# Patient Record
Sex: FEMALE | Race: WHITE | NOT HISPANIC OR LATINO | Employment: FULL TIME | URBAN - METROPOLITAN AREA
[De-identification: names, ages, dates, MRNs, and addresses within clinical notes are randomized per-mention and may not be internally consistent; named-entity substitution may affect disease eponyms.]

---

## 2018-01-05 ENCOUNTER — TRANSFERRED RECORDS (OUTPATIENT)
Dept: HEALTH INFORMATION MANAGEMENT | Facility: CLINIC | Age: 28
End: 2018-01-05

## 2018-01-05 ENCOUNTER — MEDICAL CORRESPONDENCE (OUTPATIENT)
Dept: HEALTH INFORMATION MANAGEMENT | Facility: CLINIC | Age: 28
End: 2018-01-05

## 2018-01-05 LAB — PAP SMEAR - HIM PATIENT REPORTED: NEGATIVE

## 2018-11-05 ENCOUNTER — TRANSFERRED RECORDS (OUTPATIENT)
Dept: HEALTH INFORMATION MANAGEMENT | Facility: CLINIC | Age: 28
End: 2018-11-05

## 2018-11-15 ENCOUNTER — OFFICE VISIT (OUTPATIENT)
Dept: FAMILY MEDICINE | Facility: CLINIC | Age: 28
End: 2018-11-15
Payer: COMMERCIAL

## 2018-11-15 VITALS
TEMPERATURE: 98.2 F | OXYGEN SATURATION: 97 % | BODY MASS INDEX: 21.47 KG/M2 | HEART RATE: 77 BPM | DIASTOLIC BLOOD PRESSURE: 72 MMHG | HEIGHT: 67 IN | WEIGHT: 136.8 LBS | SYSTOLIC BLOOD PRESSURE: 108 MMHG

## 2018-11-15 DIAGNOSIS — Z11.4 SCREENING FOR HIV (HUMAN IMMUNODEFICIENCY VIRUS): ICD-10-CM

## 2018-11-15 DIAGNOSIS — G43.109 MIGRAINE WITH AURA AND WITHOUT STATUS MIGRAINOSUS, NOT INTRACTABLE: ICD-10-CM

## 2018-11-15 DIAGNOSIS — Z29.89 ALTITUDE SICKNESS PREVENTATIVE MEASURES: ICD-10-CM

## 2018-11-15 DIAGNOSIS — Z00.01 ENCOUNTER FOR ROUTINE ADULT MEDICAL EXAM WITH ABNORMAL FINDINGS: Primary | ICD-10-CM

## 2018-11-15 DIAGNOSIS — F41.9 ANXIETY: ICD-10-CM

## 2018-11-15 DIAGNOSIS — Z23 NEED FOR PROPHYLACTIC VACCINATION AND INOCULATION AGAINST INFLUENZA: ICD-10-CM

## 2018-11-15 DIAGNOSIS — Z12.4 SCREENING FOR MALIGNANT NEOPLASM OF CERVIX: ICD-10-CM

## 2018-11-15 PROBLEM — R14.0 ABDOMINAL BLOATING: Status: ACTIVE | Noted: 2018-11-15

## 2018-11-15 PROBLEM — Z97.5 IUD (INTRAUTERINE DEVICE) IN PLACE: Status: ACTIVE | Noted: 2018-11-15

## 2018-11-15 PROCEDURE — 99214 OFFICE O/P EST MOD 30 MIN: CPT | Mod: 25 | Performed by: FAMILY MEDICINE

## 2018-11-15 PROCEDURE — G0145 SCR C/V CYTO,THINLAYER,RESCR: HCPCS | Performed by: FAMILY MEDICINE

## 2018-11-15 PROCEDURE — 99385 PREV VISIT NEW AGE 18-39: CPT | Performed by: FAMILY MEDICINE

## 2018-11-15 RX ORDER — SUMATRIPTAN 50 MG/1
TABLET, FILM COATED ORAL
Qty: 10 TABLET | Refills: 3 | Status: SHIPPED | OUTPATIENT
Start: 2018-11-15 | End: 2019-10-22

## 2018-11-15 RX ORDER — LORAZEPAM 0.5 MG/1
0.5 TABLET ORAL PRN
COMMUNITY
End: 2018-11-15 | Stop reason: ALTCHOICE

## 2018-11-15 RX ORDER — SUMATRIPTAN 50 MG/1
50 TABLET, FILM COATED ORAL
COMMUNITY
End: 2018-11-15

## 2018-11-15 RX ORDER — SUMATRIPTAN 50 MG/1
50 TABLET, FILM COATED ORAL
Qty: 10 TABLET | Refills: 3 | Status: SHIPPED | OUTPATIENT
Start: 2018-11-15 | End: 2018-11-15

## 2018-11-15 RX ORDER — ACETAZOLAMIDE 125 MG/1
125 TABLET ORAL 2 TIMES DAILY
Qty: 12 TABLET | Refills: 0 | Status: SHIPPED | OUTPATIENT
Start: 2018-11-15 | End: 2019-01-31

## 2018-11-15 RX ORDER — PROPRANOLOL HYDROCHLORIDE 10 MG/1
TABLET ORAL
Qty: 10 TABLET | Refills: 1 | Status: SHIPPED | OUTPATIENT
Start: 2018-11-15 | End: 2018-11-19

## 2018-11-15 ASSESSMENT — ANXIETY QUESTIONNAIRES
IF YOU CHECKED OFF ANY PROBLEMS ON THIS QUESTIONNAIRE, HOW DIFFICULT HAVE THESE PROBLEMS MADE IT FOR YOU TO DO YOUR WORK, TAKE CARE OF THINGS AT HOME, OR GET ALONG WITH OTHER PEOPLE: NOT DIFFICULT AT ALL
GAD7 TOTAL SCORE: 3
1. FEELING NERVOUS, ANXIOUS, OR ON EDGE: SEVERAL DAYS
2. NOT BEING ABLE TO STOP OR CONTROL WORRYING: NOT AT ALL
7. FEELING AFRAID AS IF SOMETHING AWFUL MIGHT HAPPEN: NOT AT ALL
3. WORRYING TOO MUCH ABOUT DIFFERENT THINGS: NOT AT ALL
6. BECOMING EASILY ANNOYED OR IRRITABLE: SEVERAL DAYS
5. BEING SO RESTLESS THAT IT IS HARD TO SIT STILL: NOT AT ALL

## 2018-11-15 ASSESSMENT — PATIENT HEALTH QUESTIONNAIRE - PHQ9
SUM OF ALL RESPONSES TO PHQ QUESTIONS 1-9: 0
5. POOR APPETITE OR OVEREATING: SEVERAL DAYS

## 2018-11-15 NOTE — PROGRESS NOTES
SUBJECTIVE:   CC: Allison J Bazinet is an 27 year old woman who presents for preventive health visit.     Patient transferring care to us at [place] from [facility] [place], [place].    She has an OB/GYN appointment with [facility] on 01/29/2019.      Healthy Habits:    Do you get at least three servings of calcium containing foods daily (dairy, green leafy vegetables, etc.)? yes    Amount of exercise or daily activities, outside of work: 3-4 days per week    Problems taking medications regularly No    Medication side effects: No    Have you had an eye exam in the past two years? yes    Do you see a dentist twice per year? yes    Do you have sleep apnea, excessive snoring or daytime drowsiness?no    Pt going to altitude to ? [place] for several days upcoming. Discussed in detail and pt desires diamox .       Anxiety Follow-Up:     Status since last visit: feels like she has a baseline level that she is used to, doesn't take the ativan but has it on hand. The last time she took it was in September 2017.    Other associated symptoms:None    Complicating factors:   Significant life event: Yes-  Just bought a house and holidays  Current substance abuse: None  Depression symptoms: No  Was on lorazepam prn - took it rarely several times a year.   GOOD-7 SCORE 11/15/2018   Total Score 3     GOOD-7.       Migraine Follow-Up    Headaches symptoms:  Last one was 2 months ago    Frequency: 0-1 per year     Duration of headaches: 1/4 to 1/2 day    Able to do normal daily activities/work with migraines: No - needs to sleep    Rescue/Relief medication:sumatriptan (Imitrex)              Effectiveness: total relief    Preventative medication: None    Neurologic complications: loss of speech    In the past 4 weeks, how often have you gone to Urgent Care or the emergency room because of your headaches?  0      Today's PHQ-2 Score:   PHQ-2 ( 1999 Pfizer) 11/15/2018   Q1: Little interest or pleasure in doing  things 0   Q2: Feeling down, depressed or hopeless 0   PHQ-2 Score 0       Abuse: Current or Past(Physical, Sexual or Emotional)- No  Do you feel safe in your environment - Yes      Social History   Substance Use Topics     Smoking status: Never Smoker     Smokeless tobacco: Never Used     Alcohol use Yes      Comment: 3-5 standard drinks or equivalent per week  -1.8-3.0 oz per week     If you drink alcohol do you typically have >3 drinks per day or >7 drinks per week? No                     Reviewed orders with patient.  Reviewed health maintenance and updated orders accordingly - Yes  BP Readings from Last 3 Encounters:   11/15/18 108/72    Wt Readings from Last 3 Encounters:   11/15/18 136 lb 12.8 oz (62.1 kg)                  Patient Active Problem List   Diagnosis     Anxiety - more social anxiety      Abdominal bloating     IUD (intrauterine device) in place     Migraine with aura and without status migrainosus, not intractable- can't do estrogen/combination ocp's      Altitude sickness preventative measures     Past Surgical History:   Procedure Laterality Date     LUMPECTOMY BREAST Right 2009    Benign, done in [place]     wisdom teeth extraction - all 4          Social History   Substance Use Topics     Smoking status: Never Smoker     Smokeless tobacco: Never Used     Alcohol use Yes      Comment: 3-5 standard drinks or equivalent per week  -1.8-3.0 oz per week     Family History   Problem Relation Age of Onset     Thyroid Disease Mother      partial thyroidectomy     Osteopenia Mother      Eye Disorder Maternal Grandmother      macular degeneration     HEART DISEASE Maternal Grandmother      Cerebrovascular Disease Maternal Grandfather      stroke and heart issues      Cardiac Sudden Death Maternal Grandfather 45     Cancer Maternal Uncle      CLL      Hypertension Father          Current Outpatient Prescriptions   Medication Sig Dispense Refill     acetaZOLAMIDE (DIAMOX) 125 MG tablet Take 1 tablet (125 mg)  by mouth 2 times daily for 6 days start day before ascent and continue 2-3 days at maximum altitude; may use once at night thereafter to improve sleep 12 tablet 0     levonorgestrel (MIRENA) 20 MCG/24HR IUD 1 each by Intrauterine route once Insert into the uterus, placed at [facility] [place] she thinks       propranolol (INDERAL) 10 MG tablet Take 1-2 tablets as needed and allow 30-60 minutes to take effect for anxiety 10 tablet 1     SUMAtriptan (IMITREX) 50 MG tablet Take 1 tab @ onset of migraine, may repeat x 1-2 tabs in 2 hours 10 tablet 3     No Known Allergies  No lab results found.     Mammogram not appropriate for this patient based on age.    No results found.      History of abnormal Pap smear: YES - updated in Problem List and Health Maintenance accordingly       Reviewed and updated as needed this visit by clinical staff  Tobacco  Allergies  Meds  Problems  Med Hx  Surg Hx  Fam Hx  Soc Hx          Reviewed and updated as needed this visit by Provider  Tobacco  Problems  Med Hx  Fam Hx  Soc Hx       Past Medical History:   Diagnosis Date     History of chicken pox      IUD (intrauterine device) in place 2014    has appt for replacement 2019 with [facility]       Migraine with aura and without status migrainosus, not intractable 11/15/2018     PNA (pneumonia) 2016    LLL     PPD screening test     Test was negative, she had exposure in [place]      Past Surgical History:   Procedure Laterality Date     LUMPECTOMY BREAST Right     Benign, done in [place]     wisdom teeth extraction - all 4        Obstetric History       T0      L0     SAB0   TAB0   Ectopic0   Multiple0   Live Births0           ROS:  CONSTITUTIONAL: NEGATIVE for fever, chills, change in weight  INTEGUMENTARU/SKIN: NEGATIVE for worrisome rashes, moles or lesions  EYES: NEGATIVE for vision changes or irritation  ENT: NEGATIVE for ear, mouth and throat problems  RESP: NEGATIVE for significant cough or SOB  BREAST:  "NEGATIVE for masses, tenderness or discharge  CV: NEGATIVE for chest pain, palpitations or peripheral edema  GI: NEGATIVE for nausea, abdominal pain, heartburn, or change in bowel habits  : NEGATIVE for unusual urinary or vaginal symptoms. Periods are rirregular secondary to waning IUD = mirena .   MUSCULOSKELETAL: NEGATIVE for significant arthralgias or myalgia  NEURO: NEGATIVE for weakness, dizziness or paresthesias  ENDOCRINE: NEGATIVE for temperature intolerance, skin/hair changes  HEME/ALLERGY/IMMUNE: NEGATIVE for bleeding problems  PSYCHIATRIC: NEGATIVE for changes in mood or affect    OBJECTIVE:   /72 (BP Location: Left arm, Patient Position: Chair, Cuff Size: Adult Regular)  Pulse 77  Temp 98.2  F (36.8  C) (Oral)  Ht 5' 6.5\" (1.689 m)  Wt 136 lb 12.8 oz (62.1 kg)  LMP 11/05/2018 (Within Days)  SpO2 97%  BMI 21.75 kg/m2  EXAM:  GENERAL: healthy, alert and no distress  EYES: Eyes grossly normal to inspection, PERRL and conjunctivae and sclerae normal  HENT: ear canals and TM's normal, nose and mouth without ulcers or lesions  NECK: no adenopathy, no asymmetry, masses, or scars and thyroid normal to palpation  RESP: lungs clear to auscultation - no rales, rhonchi or wheezes  BREAST: normal without masses, tenderness or nipple discharge and no palpable axillary masses or adenopathy  CV: regular rate and rhythm, normal S1 S2, no S3 or S4, no murmur, click or rub, no peripheral edema and peripheral pulses strong  ABDOMEN: soft, nontender, no hepatosplenomegaly, no masses and bowel sounds normal   (female): normal female external genitalia, normal urethral meatus, vaginal mucosa pink, moist, well rugated, and normal cervix/adnexa/uterus without masses or discharge  MS: no gross musculoskeletal defects noted, no edema  SKIN: no suspicious lesions or rashes  NEURO: Normal strength and tone, mentation intact and speech normal  PSYCH: mentation appears normal, affect normal/bright    Diagnostic Test " "Results:  none     ASSESSMENT/PLAN:       ICD-10-CM    1. Encounter for routine adult medical exam with abnormal findings Z00.01 Lipid panel reflex to direct LDL Fasting     CBC with platelets   2. Anxiety F41.9 DISCONTINUED: propranolol (INDERAL) 10 MG tablet   3. Migraine with aura and without status migrainosus, not intractable G43.109 SUMAtriptan (IMITREX) 50 MG tablet     DISCONTINUED: SUMAtriptan (IMITREX) 50 MG tablet   4. Altitude sickness preventative measures Z29.8 acetaZOLAMIDE (DIAMOX) 125 MG tablet   5. Screening for malignant neoplasm of cervix Z12.4 Pap imaged thin layer screen reflex to HPV if ASCUS - recommend age 25 - 29   6. Screening for HIV (human immunodeficiency virus) Z11.4 HIV Screening   7. Need for prophylactic vaccination and inoculation against influenza Z23        COUNSELING:   Reviewed preventive health counseling, as reflected in patient instructions    BP Readings from Last 1 Encounters:   11/15/18 108/72     Estimated body mass index is 21.75 kg/(m^2) as calculated from the following:    Height as of this encounter: 5' 6.5\" (1.689 m).    Weight as of this encounter: 136 lb 12.8 oz (62.1 kg).    Pt considering having IUD removed in 2/2019 and trying for pregnancy at that time. She decided to have her pap and breast exam done here today.  Discussed that she can have the IUD removal done here, we just can't replace them.         reports that she has never smoked. She has never used smokeless tobacco.      Counseling Resources:  ATP IV Guidelines  Pooled Cohorts Equation Calculator  Breast Cancer Risk Calculator  FRAX Risk Assessment  ICSI Preventive Guidelines  Dietary Guidelines for Americans, 2010  USDA's MyPlate  ASA Prophylaxis  Lung CA Screening    Adriana Acuña MD  [facility]  "

## 2018-11-15 NOTE — LETTER
[address] [address]  Phone: [phone]  Fax: [phone]  November 15, 2018     AUTHORIZATION TO RELEASE PROTECTED HEALTH INFORMATION    Patient Name:  Allison J Bazinet  YOB: 1990    Trev MRN:[id number]             This will authorize [facility]  to request information from :     [facility]  Phone: [phone]  Fax: [phone]    The following information is to be released for health maintenance and continuing care purposes with my primary care clinic:                 Pap Smear Report - all available    Visit Notes     Lab results    -I understand that I may revoke this authorization by written request at any time to the address listed at the top of this form.  I understand that the revocation will not apply to information that has already been released in response to this authorization.    -This authorization last for one year after the date you sign it.     -[facility] cannot prevent redisclosure of the information by the person or organization who receives your records under this authorization, and that information may not be covered by state and federal privacy protections after it is released. By signing this authorization, you release [facility] from any and all liability resulting from a redisclosure by the recipient.    ___________________________________          _____________  Signature of Patient/Authorized Person                     Date        ____________________________________________  (Reason if patient is unable to sign)

## 2018-11-15 NOTE — MR AVS SNAPSHOT
After Visit Summary   11/15/2018    Allison J Bazinet    MRN: [id number]           Patient Information     Date Of Birth          1990        Visit Information        Provider Department      11/15/2018 3:20 PM Adriana Acuña MD [facility]        Today's Diagnoses     Encounter for routine adult medical exam with abnormal findings    -  1    Anxiety        Migraine with aura and without status migrainosus, not intractable        Screening for HIV (human immunodeficiency virus)        Need for prophylactic vaccination and inoculation against influenza        Altitude sickness preventative measures          Care Instructions    Look on foodnetwork.com -- Williams Dukes's Brine [place] recipe.     Look for ob/gyn specialists - Dr. NEETA Goetz, Dr. Humaira Proctor, Dr. Amisha Agosto, et 20% aluminum chloride solution.       Preventive Health Recommendations  Female Ages 26 - 39  Yearly exam:   See your health care provider every year in order to    Review health changes.     Discuss preventive care.      Review your medicines if you your doctor has prescribed any.    Until age 30: Get a Pap test every three years (more often if you have had an abnormal result).    After age 30: Talk to your doctor about whether you should have a Pap test every 3 years or have a Pap test with HPV screening every 5 years.   You do not need a Pap test if your uterus was removed (hysterectomy) and you have not had cancer.  You should be tested each year for STDs (sexually transmitted diseases), if you're at risk.   Talk to your provider about how often to have your cholesterol checked.  If you are at risk for diabetes, you should have a diabetes test (fasting glucose).  Shots: Get a flu shot each year. Get a tetanus shot every 10 years.   Nutrition:     Eat at least 5 servings of fruits and vegetables each day.    Eat whole-grain bread, whole-wheat pasta and brown rice instead of white grains and  rice.    Get adequate Calcium and Vitamin D.     Lifestyle    Exercise at least 150 minutes a week (30 minutes a day, 5 days of the week). This will help you control your weight and prevent disease.    Limit alcohol to one drink per day.    No smoking.     Wear sunscreen to prevent skin cancer.    See your dentist every six months for an exam and cleaning.                  Migraine Headache  What is a migraine headache?   A migraine headache is a specific kind of headache that can last for hours to days. It can cause intense pain as well as other symptoms. For example, you may feel sick to your stomach or have changes in your vision.   How does it occur?   The exact cause of migraines is not clear. Most experts think migraine attacks begin with abnormal activity in the brain. They may be related to a problem with the blood flow in your brain, or they may happen with changes in brain chemicals. Migraine headaches often are triggered by specific things. Common migraine triggers include:   stress   tiredness   changes in the weather   certain foods, such as wine, cheese, or chocolate   MSG or food preservatives, such as nitrates   red wine   some medicines   bright lights.   Migraines tend to run in families. They affect women 3 times more often than men. They often happen right before or during a woman's menstrual period. Or they may happen when a woman is taking birth control pills or hormone replacement pills.   What are the symptoms?   Before a migraine starts, there is often a warning period when you don't feel well. Some people have vision changes before their head starts hurting. They lose part of their vision or see bright spots or zigzag patterns in front of their eyes. These warning symptoms are called migraine aura. The vision changes of the aura usually go away as the headache begins.   Migraine symptoms may include:   throbbing or pounding headache   pain that gets worse with physical activity   extreme  sensitivity to light, smells, and sounds   nausea   vomiting   The pain is usually more severe on one side of the head, but it can affect the whole head.   Sometimes a migraine can cause symptoms such as numbness or even weakness. However, these can also be symptoms of a stroke. If you have these other symptoms along with problems with your vision, do not assume a migraine is the cause. Call your healthcare provider right away.   How is it diagnosed?   Your healthcare provider will ask about your symptoms and medical history and examine you. There are no lab tests or X-rays for diagnosing migraine headaches.   A careful history of your headaches is very helpful. Your healthcare provider may ask you to keep a headache diary in which you record the following:   date and time of each attack   how long the headache lasted   type of pain (for example, dull, sharp, throbbing, or a feeling of pressure)   location of pain   any symptoms before the headache began   foods and drinks you had before the headache began (This should include checking the ingredients in the product ingredient list of packaged foods you have eaten. Saving the labels of the foods or drinks might be a good way to record this information.)   use of cigarettes, caffeine, alcohol, or carbonated drinks before the headache began   time you went to bed and time you got up before the headache began   if you are a woman, the dates of your menstrual periods and use of birth control pills or other female hormones.   Depending on your headache symptoms and physical exam, your provider may recommend tests to check for other, more serious causes of your symptoms. For example, you may have a brain scan or magnetic resonance imaging (MRI).   How is it treated?   You may be able to stop mild migraine headaches by taking nonprescription pain-relief medicine when you start to have symptoms. Aspirin, acetaminophen, caffeine, ibuprofen, and naproxen have all been shown  to be effective. You may find that any one of these medicines alone will treat your headache. Even just a caffeinated drink may help. However, some studies have shown combinations to be more effective and to work faster. Excedrin Migraine is an example of such a combination. It includes acetaminophen, aspirin, and caffeine. Other combination drugs, such as Midrin, are available for mild to moderate headaches with a prescription from your healthcare provider.   Check with your healthcare provider before you give any medicine that contains aspirin or salicylates to a child or teen. This includes medicines like baby aspirin, some cold medicines, and Pepto Bismol. Children and teens who take aspirin are at risk for a serious illness called Reye's syndrome. Ibuprofen and naproxen are nonsteroidal anti-inflammatory medicines (NSAIDs). NSAIDs may cause stomach bleeding and other problems. These risks increase with age. Read the label and take as directed. Unless recommended by your healthcare provider, do not take NSAIDs for more than 10 days for any reason.   Other medicines your healthcare provider may prescribe to help keep headaches from getting severe once they start are:   A group of drugs called triptans, which are available as tablets (including some that may be taken without water), a shot, and a nasal spray. Examples of triptans are naratriptan, rizatriptan, sumatriptan, and zolmitriptan.   Ergot medicines such as dihydroergotamine (DHE) or ergotamine. These medicines are available in various forms, including pills you swallow or put under your tongue, nasal spray, rectal suppositories and shots.   It's best to take these medicines as soon as possible after a headache begins. This means you need to recognize the warning symptoms.   If you have frequent migraines (3 or more a month), you may need to take other medicine every day to prevent severe and frequent headaches. Examples of drugs your provider may  prescribe for this purpose are:   antiseizure medicines (divalproex sodium/valproate, gabapentin, or topiramate)   antidepressants (tricyclics, such as amitriptyline, nortriptyline, or doxepin)   some beta blockers (such as atenolol, metoprolol, nadolol, nebivolol, propranolol, or timolol)   some calcium channel blockers (such as verapamil).   Women who have migraines triggered by their menstrual cycle may take preventive medicines for a few days around their period. Medicines that may be recommended are NSAIDs, triptans, and ergots. If these medicines do not work, hormone (estrogen) therapy may be helpful. Hormone therapy may also be helpful for women who have migraines during or after menopause. However, there is an increased risk of stroke for women with migraines who use birth control products (contraceptives) that contain estrogen.   You may not know if a medicine works for you until you have tried it for several weeks.   If you are planning to get pregnant, be sure to talk to your healthcare provider about whether the medicines you have been prescribed are safe during pregnancy. If they are not known to be safe, you will need a different treatment plan while you are pregnant and breastfeeding.   How long will the effects last?   The headache may last from a few hours to a few days. You may tend to get migraines for the rest of your life. However, many people find that they have migraines less often as they get older.   How can I take care of myself?   When you have a migraine:   As soon as possible after the symptoms start, take the medicine recommended or prescribed by your healthcare provider.   If you can, rest in a quiet room until the symptoms are gone. The pain may go away with sleep.   Put a cool, moist washcloth on the painful side of your head. You might also try a heating pad set on the lowest setting.   Don't drive a car while you have the headache.   You can make your migraines easier to take care  of. Learn to become aware of your early warning signs of headache. You will need to pay close attention to your body to be aware of these signs. When the warning signs appear, try going to a quieter place and doing relaxation exercises. This early care can make a big difference in how easily you can get over the migraine.   If your symptoms don't get better when you take medicine, make another appointment with your healthcare provider. It may take several visits to find the best way to control your headaches. Also, if you are having headaches more often, make a follow-up appointment with your provider to see if you need more testing or preventive medicine.   Call your healthcare provider right away if:   Changes in your vision do not go away.   You have symptoms that are not usually part of your migraines, such as:   trouble talking or slurred speech   arm or leg weakness   You have other symptoms such as:   fever   stiff neck   repeated vomiting for several hours   numbness or tingling in your face, arms, or legs   You are pregnant and your headache is particularly bad or it seems different from your usual migraines, particularly in the last half of pregnancy. This is especially important if you have problems with your vision such as flashing lights, difficulty focusing or blurriness, any nausea or vomiting, or weakness in any part of your body. These may be signs of a developing pregnancy problem that needs immediate attention.   How can I help prevent migraine headaches?   Prevention is an important part of treatment. To help prevent migraine headaches:   You may need to take medicine prescribed by your healthcare provider.   You may need to avoid certain foods or activities suggested by your headache diary as possible triggers of headaches. Common food triggers are:   citrus fruit   chocolate   aged cheese and other preserved or aged foods containing tyramine, including leftovers held for more than 1 or 2 days  "at refrigerator temperature   sodium nitrate (found, for example, in food coloring, preservatives, processed meats and fish, hot dogs, and luncheon meats)   monosodium glutamate \"MSG\" (found, for example, in Chinese food, pepperoni, and many processed foods)   red wine and beer   the artificial sweetener aspartame   Ask your provider about avoiding medicines that may trigger headaches.   If you are taking birth control pills or other female hormones, ask your provider if you should stop taking them.   If you smoke, stop. If someone else in your household smokes, ask them to smoke outside. Cigarette smoke can make your symptoms worse.   Eat healthy meals at about the same time each day. Don't skip meals, especially breakfast.   Get regular rest and exercise.   Try to reduce stress. Relaxation exercises and biofeedback may help you manage stress.   Limit alcohol to no more than 1 drink a day for women and two drinks a day for men.                      Generalized Anxiety Disorder  What is generalized anxiety disorder?   Generalized anxiety disorder (GOOD) is a condition in which a person worries excessively and unrealistically. They may also be jittery, restless, or dizzy. When these symptoms last for at least 6 months, a diagnosis of GOOD may be made.  GOOD may exist by itself, or with both anxiety and depression. It is estimated that almost 5% of people have had this disorder during their lives.  How does it occur?   The cause of GOOD is unknown. Genetic and environmental factors play a role. Women have GOOD about twice as often as men.  The worry in GOOD is not about panic attacks or being afraid in public places. It is typically \"free-floating\" anxiety out of proportion to any real life situation. The worrying can interfere with normal day-to-day activities and work or school.  What are the symptoms?   Symptoms include excessive, unrealistic, and uncontrollable worrying about many things such as:  the state of the world "   the economy   violence in society   your job   the bills   chores   family members  Physical symptoms such as muscle tension, sleep problems, or feeling on edge usually go along with anxiety. A person may be short-tempered and unable to focus or concentrate because of the worrying. Other symptoms include sweating, shaking, having a very fast heartbeat, feeling out of breath, needing to go to the bathroom often and feeling like fainting. People with GOOD may be uneasy in a group or in a waiting room.  How is it diagnosed?   There is no lab test for GOOD. Your healthcare provider or therapist will ask about your symptoms. He or she will make sure you do not have a medical illness or drug or alcohol problem that could cause the symptoms. Some medicines can cause anxiety or make it worse. These include asthma medicines, stimulants, and steroids such as prednisone.  If you have had the symptoms for at least 6 months, if you have had to cut back on your activities, and if you find it difficult to get things done, you may be diagnosed with generalized anxiety disorder.  How is it treated?   Different types of approaches have proven helpful in treating GOOD. These include medicine, behavior therapy, relaxation therapy, cognitive therapy, and stress management techniques. Which treatments your healthcare provider or therapist uses may depend upon how much the disorder interferes with your day-to-day life.  Several types of medicines can help treat GOOD. Your healthcare provider will work with you to carefully select the best one for you.  How long will the effects last?   GOOD can last many years and sometimes an entire lifetime.   How can I take care of myself?   Get support. Talk with family and friends. Consider joining a support group in your area. Go to a stress management class in your local community.   Learn to manage stress. Ask for help at home and work when the load is too great to handle. Find ways to relax, for  example take up a hobby, listen to music, watch movies, take walks. Try deep breathing exercises when you feel stressed.   Take care of your physical health. Try to get at least 7 to 9 hours of sleep each night. Eat a healthy diet. Limit caffeine. If you smoke, quit. Avoid alcohol and drugs, because they can make your symptoms worse. Exercise according to your healthcare provider's instructions.   Check your medicines. To help prevent problems, tell your healthcare provider and pharmacist about all the medicines, natural remedies, vitamins, and other supplements that you take.   Contact your healthcare provider or therapist if you have any questions or your symptoms seem to be getting worse.  You may also want to contact [facility] (formerly the National Mental Health Association or [facility]). [facility]'s toll-free Information Center number is 5-250-999-[facility]. Its web site address is [email or web address]               Thank you for choosing [facility]  for your Health Care. It was a pleasure seeing you at your visit today. Please contact us with any questions or concerns you may have.                   Adriana Acuña MD                                  To reach your [facility] care team after hours call:   [phone]    Our clinic hours are:     Monday- 7:30 am - 7:00 pm                             Tuesday through Friday- 7:30 am - 5:00 pm                                        Saturday- 8:00 am - 12:00 pm                  Phone:  [phone]    Our pharmacy hours are:     Monday  8:00 am to 7:00 pm      Tuesday through Friday 8:00am to 6:00pm                        Saturday - 9:00 am to 1:00 pm      Sunday : Closed.              Phone:  [phone]      There is also information available at our web site:  [email or web address]    If your provider ordered any lab tests and you do not receive the results within 10 business days, please call the clinic.    If you need a  "medication refill please contact your pharmacy.  Please allow 2 business days for your refill to be completed.    Our clinic offers telephone visits and e visits.  Please ask one of your team members to explain more.      Use [employer] (secure email communication and access to your chart) to send your primary care provider a message or make an appointment. Ask someone on your Team how to sign up for [facility].                         Follow-ups after your visit        Future tests that were ordered for you today     Open Future Orders        Priority Expected Expires Ordered    HIV Screening Routine  11/15/2019 11/15/2018    Lipid panel reflex to direct LDL Fasting Routine  11/15/2019 11/15/2018    CBC with platelets Routine  11/15/2019 11/15/2018            Who to contact     If you have questions or need follow up information about today's clinic visit or your schedule please contact [facility] directly at [phone].  Normal or non-critical lab and imaging results will be communicated to you by MyChart, letter or phone within 4 business days after the clinic has received the results. If you do not hear from us within 7 days, please contact the clinic through [facility] or phone. If you have a critical or abnormal lab result, we will notify you by phone as soon as possible.  Submit refill requests through [employer] or call your pharmacy and they will forward the refill request to us. Please allow 3 business days for your refill to be completed.          Additional Information About Your Visit        [facility] Information     [facility] lets you send messages to your doctor, view your test results, renew your prescriptions, schedule appointments and more. To sign up, go to [email or web address] . Click on \"Log in\" on the left side of the screen, which will take you to the Welcome page. Then click on \"Sign up Now\" on the right side of the page.     You will be asked to enter the access code listed below, as " "well as some personal information. Please follow the directions to create your username and password.     Your access code is: [email or web address]  Expires: 2019  4:49 PM     Your access code will  in 90 days. If you need help or a new code, please call your [facility] clinic or [phone].        Care EveryWhere ID     This is your Care EveryWhere ID. This could be used by other organizations to access your [facility] medical records  [id number]        Your Vitals Were     Pulse Temperature Height Last Period Pulse Oximetry BMI (Body Mass Index)    77 98.2  F (36.8  C) (Oral) 5' 6.5\" (1.689 m) 2018 (Within Days) 97% 21.75 kg/m2       Blood Pressure from Last 3 Encounters:   11/15/18 108/72    Weight from Last 3 Encounters:   11/15/18 136 lb 12.8 oz (62.1 kg)              We Performed the Following     MIGRAINE ACTION PLAN          Today's Medication Changes          These changes are accurate as of 11/15/18  4:49 PM.  If you have any questions, ask your nurse or doctor.               Start taking these medicines.        Dose/Directions    acetaZOLAMIDE 125 MG tablet   Commonly known as:  DIAMOX   Used for:  Altitude sickness preventative measures   Started by:  Adriana Acuña MD        Dose:  125 mg   Take 1 tablet (125 mg) by mouth 2 times daily for 6 days start day before ascent and continue 2-3 days at maximum altitude; may use once at night thereafter to improve sleep   Quantity:  12 tablet   Refills:  0       propranolol 10 MG tablet   Commonly known as:  INDERAL   Used for:  Anxiety   Started by:  Adriana Acuña MD        Take 1-2 tablets as needed and allow 30-60 minutes to take effect for anxiety   Quantity:  10 tablet   Refills:  1       SUMAtriptan 50 MG tablet   Commonly known as:  IMITREX   Used for:  Migraine with aura and without status migrainosus, not intractable   Started by:  Adriana Acuña MD        Take 1 tab @ onset of migraine, may repeat x 1-2 tabs in 2 " hours   Quantity:  10 tablet   Refills:  3         Stop taking these medicines if you haven't already. Please contact your care team if you have questions.     ATIVAN 0.5 MG tablet   Generic drug:  LORazepam   Stopped by:  Adriana Acuña MD                Where to get your medicines      These medications were sent to [facility] [facility] - [place], [place] - [address]  [address], [place] [address]     Phone:  [phone]     propranolol 10 MG tablet    SUMAtriptan 50 MG tablet         Some of these will need a paper prescription and others can be bought over the counter.  Ask your nurse if you have questions.     Bring a paper prescription for each of these medications     acetaZOLAMIDE 125 MG tablet                Primary Care Provider Office Phone # Fax #    Adriana Acuña [phone]       [address] [place] [id number]        Equal Access to Services     [facility]: Hadii aad ku hadasho Soomaali, waaxda luqadaha, qaybta kaalmada adeegyada, waxay delmiin hayevelian frank cardoso . So [facility] [phone].    ATENCIÓN: Si habla español, tiene a petersen disposición servicios gratuitos de asistencia lingüística. [facility] [phone].    We comply with applicable federal civil rights laws and [place] laws. We do not discriminate on the basis of race, color, national origin, age, disability, sex, sexual orientation, or gender identity.            Thank you!     Thank you for choosing [facility]  for your care. Our goal is always to provide you with excellent care. Hearing back from our patients is one way we can continue to improve our services. Please take a few minutes to complete the written survey that you may receive in the mail after your visit with us. Thank you!             Your Updated Medication List - Protect others around you: Learn how to safely use, store and throw away your medicines at www.disposemymeds.org.          This  list is accurate as of 11/15/18  4:49 PM.  Always use your most recent med list.                   Brand Name Dispense Instructions for use Diagnosis    acetaZOLAMIDE 125 MG tablet    DIAMOX    12 tablet    Take 1 tablet (125 mg) by mouth 2 times daily for 6 days start day before ascent and continue 2-3 days at maximum altitude; may use once at night thereafter to improve sleep    Alt[place] sickness preventative measures       levonorgestrel 20 MCG/24HR IUD    MIRENA     1 each by Intrauterine route once Insert into the uterus, [place] [facility] [place] she thinks        propranolol 10 MG tablet    INDERAL    10 tablet    Take 1-2 tablets as needed and allow 30-60 minutes to take effect for anxiety    Anxiety       SUMAtriptan 50 MG tablet    IMITREX    10 tablet    Take 1 tab @ onset of migraine, may repeat x 1-2 tabs in 2 hours    Migraine with aura and without status migrainosus, not intractable

## 2018-11-15 NOTE — LETTER
My Migraine Action Plan      Date: 11/15/2018     My Name: Allison J Bazinet   YOB: 1990  My Pharmacy: [id number] [place] [id number] [facility] [address]       My (Preventative) Control Medicine: stay well nourished and well hydrated         My Rescue Medicine: imitrex    My Doctor: Adriana Acuña     My Clinic: [address] [id number] [phone]        GREEN ZONE = Good Control    My headache plan is working.   I can do what I need to do.           I WILL:     ? Keep managing my triggers.  ? Write in my migraine diary each time I have a headache.  ? Keep taking my preventive (controller) medicine daily.  ? Take my relief and rescue medicine as needed.             YELLOW ZONE = Not Enough Control    My headache plan isn t always working.   My headaches keep me from doing   some of the things I need to do.       I WILL:     ? Set goals to control my triggers and act on them.  ? Write in my migraine diary each time I have a headache and review it for                      patterns or new triggers.  ? Keep taking my preventive (controller) medicine daily.  ? Take my relief and rescue medicine as needed.  ? Call my doctor or clinic at if I stay in the Yellow Zone.             RED ZONE = Poor or No Control    My headache plan has  failed. I can t do anything  when I have one. My  medicines aren t working.           I WILL:   ? Set goals to control my triggers and act on them.  ? Write in my migraine diary each time I have a headache and review it for                      patterns or new triggers.  ? Keep taking my preventive (controller) medicine daily.  ? Take my relief and rescue medicine as needed.  ? Call my doctor or clinic or go to urgent care or an ER if I m having the worst                  headache of my life.  ? Call my doctor or clinic or go to urgent care or an ER if my medicine doesn t work.  ? Let my doctor or clinic  know within 2 weeks if I have gone to an urgent care or             emergency department.          Provider specific instructions:  None.

## 2018-11-15 NOTE — PATIENT INSTRUCTIONS
Look on contrib.com -- [employer] [place] recipe.     Look for ob/gyn specialists - Dr. NEETA Goetz, Dr. Humaira Proctor, Dr. Amisha Agosto, et 20% aluminum chloride solution.       Preventive Health Recommendations  Female Ages 26 - 39  Yearly exam:   See your health care provider every year in order to    Review health changes.     Discuss preventive care.      Review your medicines if you your doctor has prescribed any.    Until age 30: Get a Pap test every three years (more often if you have had an abnormal result).    After age 30: Talk to your doctor about whether you should have a Pap test every 3 years or have a Pap test with HPV screening every 5 years.   You do not need a Pap test if your uterus was removed (hysterectomy) and you have not had cancer.  You should be tested each year for STDs (sexually transmitted diseases), if you're at risk.   Talk to your provider about how often to have your cholesterol checked.  If you are at risk for diabetes, you should have a diabetes test (fasting glucose).  Shots: Get a flu shot each year. Get a tetanus shot every 10 years.   Nutrition:     Eat at least 5 servings of fruits and vegetables each day.    Eat whole-grain bread, whole-wheat pasta and brown rice instead of white grains and rice.    Get adequate Calcium and Vitamin D.     Lifestyle    Exercise at least 150 minutes a week (30 minutes a day, 5 days of the week). This will help you control your weight and prevent disease.    Limit alcohol to one drink per day.    No smoking.     Wear sunscreen to prevent skin cancer.    See your dentist every six months for an exam and cleaning.                  Migraine Headache  What is a migraine headache?   A migraine headache is a specific kind of headache that can last for hours to days. It can cause intense pain as well as other symptoms. For example, you may feel sick to your stomach or have changes in your vision.   How does it occur?   The exact cause  of migraines is not clear. Most experts think migraine attacks begin with abnormal activity in the brain. They may be related to a problem with the blood flow in your brain, or they may happen with changes in brain chemicals. Migraine headaches often are triggered by specific things. Common migraine triggers include:   stress   tiredness   changes in the weather   certain foods, such as wine, cheese, or chocolate   MSG or food preservatives, such as nitrates   red wine   some medicines   bright lights.   Migraines tend to run in families. They affect women 3 times more often than men. They often happen right before or during a woman's menstrual period. Or they may happen when a woman is taking birth control pills or hormone replacement pills.   What are the symptoms?   Before a migraine starts, there is often a warning period when you don't feel well. Some people have vision changes before their head starts hurting. They lose part of their vision or see bright spots or zigzag patterns in front of their eyes. These warning symptoms are called migraine aura. The vision changes of the aura usually go away as the headache begins.   Migraine symptoms may include:   throbbing or pounding headache   pain that gets worse with physical activity   extreme sensitivity to light, smells, and sounds   nausea   vomiting   The pain is usually more severe on one side of the head, but it can affect the whole head.   Sometimes a migraine can cause symptoms such as numbness or even weakness. However, these can also be symptoms of a stroke. If you have these other symptoms along with problems with your vision, do not assume a migraine is the cause. Call your healthcare provider right away.   How is it diagnosed?   Your healthcare provider will ask about your symptoms and medical history and examine you. There are no lab tests or X-rays for diagnosing migraine headaches.   A careful history of your headaches is very helpful. Your  healthcare provider may ask you to keep a headache diary in which you record the following:   date and time of each attack   how long the headache lasted   type of pain (for example, dull, sharp, throbbing, or a feeling of pressure)   location of pain   any symptoms before the headache began   foods and drinks you had before the headache began (This should include checking the ingredients in the product ingredient list of packaged foods you have eaten. Saving the labels of the foods or drinks might be a good way to record this information.)   use of cigarettes, caffeine, alcohol, or carbonated drinks before the headache began   time you went to bed and time you got up before the headache began   if you are a woman, the dates of your menstrual periods and use of birth control pills or other female hormones.   Depending on your headache symptoms and physical exam, your provider may recommend tests to check for other, more serious causes of your symptoms. For example, you may have a brain scan or magnetic resonance imaging (MRI).   How is it treated?   You may be able to stop mild migraine headaches by taking nonprescription pain-relief medicine when you start to have symptoms. Aspirin, acetaminophen, caffeine, ibuprofen, and naproxen have all been shown to be effective. You may find that any one of these medicines alone will treat your headache. Even just a caffeinated drink may help. However, some studies have shown combinations to be more effective and to work faster. Excedrin Migraine is an example of such a combination. It includes acetaminophen, aspirin, and caffeine. Other combination drugs, such as Midrin, are available for mild to moderate headaches with a prescription from your healthcare provider.   Check with your healthcare provider before you give any medicine that contains aspirin or salicylates to a child or teen. This includes medicines like baby aspirin, some cold medicines, and Pepto Bismol. Children  and teens who take aspirin are at risk for a serious illness called Reye's syndrome. Ibuprofen and naproxen are nonsteroidal anti-inflammatory medicines (NSAIDs). NSAIDs may cause stomach bleeding and other problems. These risks increase with age. Read the label and take as directed. Unless recommended by your healthcare provider, do not take NSAIDs for more than 10 days for any reason.   Other medicines your healthcare provider may prescribe to help keep headaches from getting severe once they start are:   A group of drugs called triptans, which are available as tablets (including some that may be taken without water), a shot, and a nasal spray. Examples of triptans are naratriptan, rizatriptan, sumatriptan, and zolmitriptan.   Ergot medicines such as dihydroergotamine (DHE) or ergotamine. These medicines are available in various forms, including pills you swallow or put under your tongue, nasal spray, rectal suppositories and shots.   It's best to take these medicines as soon as possible after a headache begins. This means you need to recognize the warning symptoms.   If you have frequent migraines (3 or more a month), you may need to take other medicine every day to prevent severe and frequent headaches. Examples of drugs your provider may prescribe for this purpose are:   antiseizure medicines (divalproex sodium/valproate, gabapentin, or topiramate)   antidepressants (tricyclics, such as amitriptyline, nortriptyline, or doxepin)   some beta blockers (such as atenolol, metoprolol, nadolol, nebivolol, propranolol, or timolol)   some calcium channel blockers (such as verapamil).   Women who have migraines triggered by their menstrual cycle may take preventive medicines for a few days around their period. Medicines that may be recommended are NSAIDs, triptans, and ergots. If these medicines do not work, hormone (estrogen) therapy may be helpful. Hormone therapy may also be helpful for women who have migraines during  or after menopause. However, there is an increased risk of stroke for women with migraines who use birth control products (contraceptives) that contain estrogen.   You may not know if a medicine works for you until you have tried it for several weeks.   If you are planning to get pregnant, be sure to talk to your healthcare provider about whether the medicines you have been prescribed are safe during pregnancy. If they are not known to be safe, you will need a different treatment plan while you are pregnant and breastfeeding.   How long will the effects last?   The headache may last from a few hours to a few days. You may tend to get migraines for the rest of your life. However, many people find that they have migraines less often as they get older.   How can I take care of myself?   When you have a migraine:   As soon as possible after the symptoms start, take the medicine recommended or prescribed by your healthcare provider.   If you can, rest in a quiet room until the symptoms are gone. The pain may go away with sleep.   Put a cool, moist washcloth on the painful side of your head. You might also try a heating pad set on the lowest setting.   Don't drive a car while you have the headache.   You can make your migraines easier to take care of. Learn to become aware of your early warning signs of headache. You will need to pay close attention to your body to be aware of these signs. When the warning signs appear, try going to a quieter place and doing relaxation exercises. This early care can make a big difference in how easily you can get over the migraine.   If your symptoms don't get better when you take medicine, make another appointment with your healthcare provider. It may take several visits to find the best way to control your headaches. Also, if you are having headaches more often, make a follow-up appointment with your provider to see if you need more testing or preventive medicine.   Call your healthcare  "provider right away if:   Changes in your vision do not go away.   You have symptoms that are not usually part of your migraines, such as:   trouble talking or slurred speech   arm or leg weakness   You have other symptoms such as:   fever   stiff neck   repeated vomiting for several hours   numbness or tingling in your face, arms, or legs   You are pregnant and your headache is particularly bad or it seems different from your usual migraines, particularly in the last half of pregnancy. This is especially important if you have problems with your vision such as flashing lights, difficulty focusing or blurriness, any nausea or vomiting, or weakness in any part of your body. These may be signs of a developing pregnancy problem that needs immediate attention.   How can I help prevent migraine headaches?   Prevention is an important part of treatment. To help prevent migraine headaches:   You may need to take medicine prescribed by your healthcare provider.   You may need to avoid certain foods or activities suggested by your headache diary as possible triggers of headaches. Common food triggers are:   citrus fruit   chocolate   aged cheese and other preserved or aged foods containing tyramine, including leftovers held for more than 1 or 2 days at refrigerator temperature   sodium nitrate (found, for example, in food coloring, preservatives, processed meats and fish, hot dogs, and luncheon meats)   monosodium glutamate \"MSG\" (found, for example, in Chinese food, pepperoni, and many processed foods)   red wine and beer   the artificial sweetener aspartame   Ask your provider about avoiding medicines that may trigger headaches.   If you are taking birth control pills or other female hormones, ask your provider if you should stop taking them.   If you smoke, stop. If someone else in your household smokes, ask them to smoke outside. Cigarette smoke can make your symptoms worse.   Eat healthy meals at about the same time each " "day. Don't skip meals, especially breakfast.   Get regular rest and exercise.   Try to reduce stress. Relaxation exercises and biofeedback may help you manage stress.   Limit alcohol to no more than 1 drink a day for women and two drinks a day for men.                      Generalized Anxiety Disorder  What is generalized anxiety disorder?   Generalized anxiety disorder (GOOD) is a condition in which a person worries excessively and unrealistically. They may also be jittery, restless, or dizzy. When these symptoms last for at least 6 months, a diagnosis of GOOD may be made.  GOOD may exist by itself, or with both anxiety and depression. It is estimated that almost 5% of people have had this disorder during their lives.  How does it occur?   The cause of GOOD is unknown. Genetic and environmental factors play a role. Women have GOOD about twice as often as men.  The worry in GOOD is not about panic attacks or being afraid in public places. It is typically \"free-floating\" anxiety out of proportion to any real life situation. The worrying can interfere with normal day-to-day activities and work or school.  What are the symptoms?   Symptoms include excessive, unrealistic, and uncontrollable worrying about many things such as:  the state of the world   the economy   violence in society   your job   the bills   chores   family members  Physical symptoms such as muscle tension, sleep problems, or feeling on edge usually go along with anxiety. A person may be short-tempered and unable to focus or concentrate because of the worrying. Other symptoms include sweating, shaking, having a very fast heartbeat, feeling out of breath, needing to go to the bathroom often and feeling like fainting. People with GOOD may be uneasy in a group or in a waiting room.  How is it diagnosed?   There is no lab test for GOOD. Your healthcare provider or therapist will ask about your symptoms. He or she will make sure you do not have a medical illness or " drug or alcohol problem that could cause the symptoms. Some medicines can cause anxiety or make it worse. These include asthma medicines, stimulants, and steroids such as prednisone.  If you have had the symptoms for at least 6 months, if you have had to cut back on your activities, and if you find it difficult to get things done, you may be diagnosed with generalized anxiety disorder.  How is it treated?   Different types of approaches have proven helpful in treating GOOD. These include medicine, behavior therapy, relaxation therapy, cognitive therapy, and stress management techniques. Which treatments your healthcare provider or therapist uses may depend upon how much the disorder interferes with your day-to-day life.  Several types of medicines can help treat GOOD. Your healthcare provider will work with you to carefully select the best one for you.  How long will the effects last?   GOOD can last many years and sometimes an entire lifetime.   How can I take care of myself?   Get support. Talk with family and friends. Consider joining a support group in your area. Go to a stress management class in your local community.   Learn to manage stress. Ask for help at home and work when the load is too great to handle. Find ways to relax, for example take up a hobby, listen to music, watch movies, take walks. Try deep breathing exercises when you feel stressed.   Take care of your physical health. Try to get at least 7 to 9 hours of sleep each night. Eat a healthy diet. Limit caffeine. If you smoke, quit. Avoid alcohol and drugs, because they can make your symptoms worse. Exercise according to your healthcare provider's instructions.   Check your medicines. To help prevent problems, tell your healthcare provider and pharmacist about all the medicines, natural remedies, vitamins, and other supplements that you take.   Contact your healthcare provider or therapist if you have any questions or your symptoms seem to be getting  worse.  You may also want to contact [facility] (formerly the [employer] or [facility]). [facility]'s toll-free Information Center number is 5-992-791-[facility]. Its web site address is [email or web address]               Thank you for choosing [facility]  for your Health Care. It was a pleasure seeing you at your visit today. Please contact us with any questions or concerns you may have.                   Adriana cAuña MD                                  To reach your [facility] care team after hours call:   [phone]    Our clinic hours are:     Monday- 7:30 am - 7:00 pm                             Tuesday through Friday- 7:30 am - 5:00 pm                                        Saturday- 8:00 am - 12:00 pm                  Phone:  [phone]    Our pharmacy hours are:     Monday  8:00 am to 7:00 pm      Tuesday through Friday 8:00am to 6:00pm                        Saturday - 9:00 am to 1:00 pm      Sunday : Closed.              Phone:  [phone]      There is also information available at our web site:  [email or web address]    If your provider ordered any lab tests and you do not receive the results within 10 business days, please call the clinic.    If you need a medication refill please contact your pharmacy.  Please allow 2 business days for your refill to be completed.    Our clinic offers telephone visits and e visits.  Please ask one of your team members to explain more.      Use [employer] (secure email communication and access to your chart) to send your primary care provider a message or make an appointment. Ask someone on your Team how to sign up for [employer].

## 2018-11-16 ASSESSMENT — ANXIETY QUESTIONNAIRES: GAD7 TOTAL SCORE: 3

## 2018-11-20 LAB
COPATH REPORT: NORMAL
PAP: NORMAL

## 2019-01-31 ENCOUNTER — OFFICE VISIT (OUTPATIENT)
Dept: FAMILY MEDICINE | Facility: CLINIC | Age: 29
End: 2019-01-31
Payer: COMMERCIAL

## 2019-01-31 VITALS
DIASTOLIC BLOOD PRESSURE: 72 MMHG | HEART RATE: 72 BPM | TEMPERATURE: 98.6 F | WEIGHT: 140.6 LBS | HEIGHT: 67 IN | SYSTOLIC BLOOD PRESSURE: 110 MMHG | BODY MASS INDEX: 22.07 KG/M2 | OXYGEN SATURATION: 97 %

## 2019-01-31 DIAGNOSIS — Z31.69 ENCOUNTER FOR PRECONCEPTION CONSULTATION: Primary | ICD-10-CM

## 2019-01-31 DIAGNOSIS — Z97.5 IUD (INTRAUTERINE DEVICE) IN PLACE: ICD-10-CM

## 2019-01-31 PROCEDURE — 58301 REMOVE INTRAUTERINE DEVICE: CPT | Performed by: FAMILY MEDICINE

## 2019-01-31 RX ORDER — PNV NO.95/FERROUS FUM/FOLIC AC 28MG-0.8MG
1 TABLET ORAL DAILY
Qty: 100 TABLET | Refills: 3 | Status: SHIPPED | OUTPATIENT
Start: 2019-01-31 | End: 2020-01-06

## 2019-01-31 ASSESSMENT — MIFFLIN-ST. JEOR: SCORE: 1387.45

## 2019-01-31 NOTE — PATIENT INSTRUCTIONS
Please, call or return to clinic or go to the ER immediately if signs or symptoms worsen or fail to improve as anticipated.     pt strongly encouraged to use condoms with nonoxynol- 9 with every intercourse for birth control until you are ready to conceive.     Take your prenatal vits daily.       Preparing for Pregnancy  Remember: As soon as you know you are pregnant, get regular prenatal care.   Even before you become pregnant, your health matters to your future baby. Adopt good health habits today. And take care of any medical problems you have before becoming pregnant.  Things to Consider  Read through the list below. The more items that describe you, the healthier you may be.    I limit the amount of fat I eat.    I keep physically active.    I have my health problems under control.    My weight is about right.    I don t smoke.    I don t use recreational drugs.    I don t have a drinking problem.  Think about the following:    Who will help you through pregnancy and with childcare?    Do you have health insurance?    Do you have the money needed to cover childcare and other day-to-day child expenses?    Will you be able to take the time you need away from your job for maternity needs and childcare?  Adopt a Healthy Lifestyle  Each of the following tips can improve your health as you prepare for pregnancy:    Take a daily vitamin supplement that contains folic acid (a vitamin that reduces the chance of some birth defects) and iron.    Eat a high-fiber, low-fat diet rich in fruits and vegetables.    Exercise 3 or more times a week.    Get within 15 pounds of your ideal weight.  The first weeks of pregnancy are the most important time in a baby s development. Before you become pregnant:    Don t use recreational drugs.    Don t drink alcohol.    Don t smoke.  Working with Your Health Care Provider  Your health care provider can help answer any questions you may have. Do you know when to stop taking birth control  pills? Are any over-the-counter medicines safe for pregnant women? You can also ask about special care you may need if you have any of the following:    Sexually transmitted diseases (STDs), such as herpes or chlamydia    Diabetes    High blood pressure    Other chronic health problems     [id number] Gerhard Curry, [address] [place] [address], [place] [address]. All rights reserved. This information is not intended as a substitute for professional medical care. Always follow your healthcare professional's instructions.                 Thank you for choosing [facility]  for your Health Care. It was a pleasure seeing you at your visit today. Please contact us with any questions or concerns you may have.                   Adriana Acuña MD                                  To reach your [facility] care team after hours call:   [phone]    Our clinic hours are:     Monday- 7:30 am - 7:00 pm                             Tuesday through Friday- 7:30 am - 5:00 pm                                        Saturday- 8:00 am - 12:00 pm                  Phone:  [phone]    Our pharmacy hours are:     Monday  8:00 am to 7:00 pm      Tuesday through Friday 8:00am to 6:00pm                        Saturday - 9:00 am to 1:00 pm      Sunday : Closed.              Phone:  [phone]      There is also information available at our web site:  [email or web address]    If your provider ordered any lab tests and you do not receive the results within 10 business days, please call the clinic.    If you need a medication refill please contact your pharmacy.  Please allow 2 business days for your refill to be completed.    Our clinic offers telephone visits and e visits.  Please ask one of your team members to explain more.      Use [employer] (secure email communication and access to your chart) to send your primary care provider a message or make an appointment. Ask someone on your Team how to sign up  for [facility].

## 2019-01-31 NOTE — PROGRESS NOTES
"  SUBJECTIVE:                                                    Allison J Bazinet is a 28 year old female who presents to clinic today for the following health issues:    Patient is here for removal of IUD and would like to discuss getting pregnant.     IUD Removal:  Is a pregnancy test required: No.  Was a consent obtained?  Yes    Allison J Bazinet is a 28 year old female,, No LMP recorded. Patient is not currently having periods (Reason: IUD). who presents today for IUD removal. Her current IUD was placed 5 years ago. She has not had problems with the IUD. She requests removal of the IUD because she desires to conceive    Today's PHQ-2 Score:   PHQ-2 (  [employer]) 11/15/2018   Q1: Little interest or pleasure in doing things 0   Q2: Feeling down, depressed or hopeless 0   PHQ-2 Score 0         Problem list and histories reviewed & adjusted, as indicated.  Additional history: as documented    Reviewed and updated as needed this visit by clinical staff  Tobacco  Allergies  Meds  Med Hx  Surg Hx  Fam Hx  Soc Hx      Reviewed and updated as needed this visit by Provider         ROS:   ROS: 12 point ROS neg other than the symptoms noted above    OBJECTIVE:                                                    /72 (BP Location: Left arm, Patient Position: Chair, Cuff Size: Adult Regular)   Pulse 72   Temp 98.6  F (37  C) (Oral)   Ht 1.689 m (5' 6.5\")   Wt 63.8 kg (140 lb 9.6 oz)   SpO2 97%   BMI 22.35 kg/m    Body mass index is 22.35 kg/m .   GENERAL: healthy, alert, well nourished, well hydrated, no distress  HENT:   Mouth- no ulcers, no lesions  NECK: no tenderness, no adenopathy, no asymmetry, no masses, no stiffness; thyroid- normal to palpation  RESP: lungs clear to auscultation - no rales, no rhonchi, no wheezes  CV: regular rates and rhythm, normal S1 S2, no S3 or S4 and no murmur, no click or rub -  ABDOMEN: soft, no tenderness, no  hepatosplenomegaly, no masses, normal bowel " sounds    Diagnostic test results:  none      ASSESSMENT/PLAN:                                                        ICD-10-CM    1. Encounter for preconception consultation Z31.69 Prenatal Vit-Fe Fumarate-FA (PRENATAL VITAMIN) 27-0.8 MG TABS   2. IUD (intrauterine device) in place Z97.5 REMOVE INTRAUTERINE DEVICE        PROCEDURE:    A speculum exam was performed and the cervix was visualized. The IUD string was visualized. Using ring forceps, the string  was grasped and the IUD removed intact.    POST PROCEDURE:    The patient tolerated the procedure well. Patient was discharged in stable condition.    Call if bleeding, pain or fever occur. and Birth control counseling given.  Pt would like to do condoms and spermicide until ready to try for conception this summer. Will start prenatal vits with folic acid now.        See Patient Instructions         Adriana Acuña MD    [facility]

## 2019-03-12 ENCOUNTER — MYC MEDICAL ADVICE (OUTPATIENT)
Dept: FAMILY MEDICINE | Facility: CLINIC | Age: 29
End: 2019-03-12

## 2019-03-12 NOTE — TELEPHONE ENCOUNTER
Mychart reply sent to patient.  Waiting response.    Rosita Hassan, BS, RN, N  [facility]) [phone]

## 2019-03-13 ENCOUNTER — TELEPHONE (OUTPATIENT)
Dept: FAMILY MEDICINE | Facility: CLINIC | Age: 29
End: 2019-03-13

## 2019-03-13 NOTE — TELEPHONE ENCOUNTER
Pt called to determine when to schedule for shoulder pain. JV booked out awhile. Scheduled with Amn this coming Friday.    Kellie Regalado RN  [facility]

## 2019-03-15 ENCOUNTER — OFFICE VISIT (OUTPATIENT)
Dept: FAMILY MEDICINE | Facility: CLINIC | Age: 29
End: 2019-03-15
Payer: COMMERCIAL

## 2019-03-15 VITALS
HEART RATE: 104 BPM | DIASTOLIC BLOOD PRESSURE: 62 MMHG | OXYGEN SATURATION: 99 % | WEIGHT: 141 LBS | BODY MASS INDEX: 22.13 KG/M2 | HEIGHT: 67 IN | TEMPERATURE: 98.3 F | SYSTOLIC BLOOD PRESSURE: 104 MMHG

## 2019-03-15 DIAGNOSIS — M89.8X1 PAIN OF LEFT SCAPULA: Primary | ICD-10-CM

## 2019-03-15 PROCEDURE — 99213 OFFICE O/P EST LOW 20 MIN: CPT | Performed by: NURSE PRACTITIONER

## 2019-03-15 ASSESSMENT — MIFFLIN-ST. JEOR: SCORE: 1389.26

## 2019-03-15 NOTE — PATIENT INSTRUCTIONS
Audrey was seen today for musculoskeletal problem.    Diagnoses and all orders for this visit:    Pain of left scapula  -     [facility] PT, HAND, AND CHIROPRACTIC REFERRAL; Future    Heat application 2-3 times daily for 15 minutes.   Ibuprofen, 600 mg every 6 hours as needed.      Follow-up with no improvement or worsening of symptoms.

## 2019-03-15 NOTE — PROGRESS NOTES
SUBJECTIVE:   Allison J Bazinet is a 28 year old female who presents to clinic today for the following health issues:      Joint Pain    Onset: a few months    Description:   Location: left shoulder  Character: when she's driving its really painful, sometimes it feels tingling- feels like its going in a [place] in shoulder blade, not a sharp pain    Intensity: moderate    Progression of Symptoms: same, usually when she's siting in the car or when she's sitting at work,  Does do strength training- but this doesn't bother her shoulder    Accompanying Signs & Symptoms:  Other symptoms: none    History:   Previous similar pain: no       Precipitating factors:   Trauma or overuse: no     Alleviating factors:  Improved by: about a month ago massage- she got one and felt better for 3 weeks.    Therapies Tried and outcome: nothing    Patient reports varying amount of pain.    Did get a massage approximately one month ago and this cleared pain up for 2-3 weeks and now it has returned.     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       Problem list and histories reviewed & adjusted, as indicated.  Additional history: as documented    Patient Active Problem List   Diagnosis     Anxiety - more social anxiety      Abdominal bloating     IUD (intrauterine device) in place     Migraine with aura and without status migrainosus, not intractable- can't do estrogen/combination ocp's      Altitude sickness preventative measures     Past Surgical History:   Procedure Laterality Date     LUMPECTOMY BREAST Right 2009    Benign, done in [place]     wisdom teeth extraction - all 4          Social History     Tobacco Use     Smoking status: Never Smoker     Smokeless tobacco: Never Used   Substance Use Topics     Alcohol use: Yes     Comment: 3-5 standard drinks or equivalent per week  -1.8-3.0 oz per week     Family History   Problem Relation Age of Onset     Thyroid Disease Mother         partial thyroidectomy      "Osteopenia Mother      Eye Disorder Maternal Grandmother         macular degeneration     Heart Disease Maternal Grandmother      Cerebrovascular Disease Maternal Grandfather         stroke and heart issues      Cardiac Sudden Death Maternal Grandfather 45     Cancer Maternal Uncle         CLL      Hypertension Father          Current Outpatient Medications   Medication Sig Dispense Refill     Prenatal Vit-Fe Fumarate-FA (PRENATAL VITAMIN) 27-0.8 MG TABS Take 1 tablet by mouth daily 100 tablet 3     propranolol (INDERAL) 10 MG tablet Take 1-2 tablets as needed and allow 30-60 minutes to take effect for anxiety 10 tablet 1     SUMAtriptan (IMITREX) 50 MG tablet Take 1 tab @ onset of migraine, may repeat x 1-2 tabs in 2 hours 10 tablet 3     No Known Allergies    Reviewed and updated as needed this visit by clinical staff       Reviewed and updated as needed this visit by Provider         ROS:  Constitutional, HEENT, cardiovascular, pulmonary, gi and gu systems are negative, except as otherwise noted.    OBJECTIVE:     /62 (BP Location: Right arm, Patient Position: Sitting, Cuff Size: Adult Regular)   Pulse 104   Temp 98.3  F (36.8  C) (Oral)   Ht 1.689 m (5' 6.5\")   Wt 64 kg (141 lb)   LMP 02/21/2019   SpO2 99%   BMI 22.42 kg/m    Body mass index is 22.42 kg/m .    GENERAL: healthy, alert and no distress  RESP: lungs clear to auscultation - no rales, rhonchi or wheezes  CV: regular rate and rhythm, normal S1 S2  MS: left posterior scapula with tenderness to palpation, no erythema, swelling  Left shoulder with full ROM, strength in UE is +5/5 symmetrical  PSYCH: mentation appears normal, affect normal/bright    ASSESSMENT/PLAN:     Audrey was seen today for musculoskeletal problem.    Diagnoses and all orders for this visit:    Pain of left scapula - musculoskeletal strain  -     [facility] PT, HAND, AND CHIROPRACTIC REFERRAL; Future - PT and chiropractic assessments.     Heat application 2-3 times daily for 15 " minutes.   Ibuprofen, 600 mg every 6 hours as needed.      Follow-up with no improvement or worsening of symptoms.         SIVAKUMAR Granados [facility]

## 2019-03-19 ENCOUNTER — THERAPY VISIT (OUTPATIENT)
Dept: PHYSICAL THERAPY | Facility: CLINIC | Age: 29
End: 2019-03-19
Payer: COMMERCIAL

## 2019-03-19 DIAGNOSIS — M25.512 LEFT SHOULDER PAIN, UNSPECIFIED CHRONICITY: ICD-10-CM

## 2019-03-19 DIAGNOSIS — M54.2 NECK PAIN ON LEFT SIDE: ICD-10-CM

## 2019-03-19 PROCEDURE — 97112 NEUROMUSCULAR REEDUCATION: CPT | Mod: GP | Performed by: PHYSICAL THERAPIST

## 2019-03-19 PROCEDURE — 97110 THERAPEUTIC EXERCISES: CPT | Mod: GP | Performed by: PHYSICAL THERAPIST

## 2019-03-19 PROCEDURE — 97161 PT EVAL LOW COMPLEX 20 MIN: CPT | Mod: GP | Performed by: PHYSICAL THERAPIST

## 2019-03-19 NOTE — PROGRESS NOTES
[facility] Initial Evaluation  Subjective:  The history is provided by the patient. No  was used.   Allison J Bazinet is a 28 year old female with a left shoulder (L scapula) condition.  Condition occurred with:  Unknown cause (pt with c/o of L scapula pain with prolonged sitting and driving. Pt with no known injury and has been feeling this pain for a few months.).  Condition occurred: for unknown reasons.  This is a new condition     Patient reports pain:  Scapular area.     and is intermittent and reported as 3/10.  Associated symptoms:  Loss of motion/stiffness (driving car triggers ). Pain is worse during the night and worse during the day.  Symptoms are exacerbated by certain positions (sitting) and relieved by activity/movement.  Since onset symptoms are unchanged.        General health as reported by patient is excellent.  Pertinent medical history includes:  Migraines/headaches.  Medical allergies: no.  Other surgeries include:  None reported.  Current medications:  None as reported by the patient.  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  Patient is working in normal job without restrictions.  Primary job tasks include:  Prolonged sitting.    Barriers include:  None as reported by the patient.    Red flags:  None as reported by the patient.                        Objective:  Standing Alignment:      Shoulder/UE:  Rounded shoulders                                  Cervical/Thoracic Evaluation  Arom wnl cervical: protrusion no pain, retractions no pain feels tight.  repeated retractions w L SB no worse.better.     AROM:  AROM Cervical:    Flexion:            WNL  Extension:       WNL no pain  Rotation:         Left: WNL slight pain L     Right: WNL   Side Bend:      Left: WNL     Right:  WNL      Headaches: cervical and migraine                         Shoulder Evaluation:  ROM:  AROM:  normal (some pain with ER)                                  Strength:  : lower  trap L 4-/5 w slight pain (scap y position testing)  Flexion: Left:5-/5   Pain:    Right: 5-/5     Pain:   Extension:  Left: 5-/5    Pain:    Right: 5-/5    Pain:  Abduction:  Left: 4+/5  Pain:    Right: 4+/5     Pain:    Internal Rotation:  Left:5/5     Pain:    Right: 5/5     Pain:  External Rotation:   Left:4+/5     Pain:   Right:4+/5     Pain:    Horizontal Abduction:  Left:4-/5     Pain:    Right:4/5    Pain:                                                 General     ROS    Assessment/Plan:    Patient is a 28 year old female with cervical and left side shoulder complaints.    Patient has the following significant findings with corresponding treatment plan.                Diagnosis 1:  L scapular pain (postural with possible cervical component)  Pain -  hot/cold therapy, US, manual therapy, self management, education and home program  Decreased ROM/flexibility - manual therapy and therapeutic exercise  Decreased strength - therapeutic exercise and therapeutic activities  Decreased function - therapeutic activities  Impaired posture - neuro re-education    Previous and current functional limitations:  (See Goal Flow Sheet for this information)    Short term and Long term goals: (See Goal Flow Sheet for this information)     Communication ability:  Patient appears to be able to clearly communicate and understand verbal and written communication and follow directions correctly.  Treatment Explanation - The following has been discussed with the patient:   RX ordered/plan of care  Anticipated outcomes  Possible risks and side effects  This patient would benefit from PT intervention to resume normal activities.   Rehab potential is good.    Frequency:  1 X week, once daily  Duration:  for 4-6 weeks  Discharge Plan:  Achieve all LTG.  Independent in home treatment program.  Reach maximal therapeutic benefit.    Please refer to the daily flowsheet for treatment today, total treatment time and time spent performing 1:1  timed codes.

## 2019-10-09 PROBLEM — M25.512 LEFT SHOULDER PAIN: Status: RESOLVED | Noted: 2019-03-19 | Resolved: 2019-10-09

## 2019-10-09 PROBLEM — M54.2 NECK PAIN ON LEFT SIDE: Status: RESOLVED | Noted: 2019-03-19 | Resolved: 2019-10-09

## 2019-10-17 ENCOUNTER — MYC MEDICAL ADVICE (OUTPATIENT)
Dept: FAMILY MEDICINE | Facility: CLINIC | Age: 29
End: 2019-10-17

## 2019-10-17 NOTE — TELEPHONE ENCOUNTER
Forwarded to J.    Please review patient's Mychart message and advise.    Rosita Hassan, RN, BS, PHN

## 2019-10-18 NOTE — TELEPHONE ENCOUNTER
OV Scheduled:   Next 5 appointments (look out 90 days)    Nov 25, 2019  4:00 PM CST  New Prenatal with Adriana Acuña MD  [facility] [address] [place] [id number] [phone]        [employer] Message sent    Karli Cardenas RN  [facility]

## 2019-10-19 ENCOUNTER — NURSE TRIAGE (OUTPATIENT)
Dept: NURSING | Facility: CLINIC | Age: 29
End: 2019-10-19

## 2019-10-19 NOTE — TELEPHONE ENCOUNTER
"Patient calling stating she had a positive pregnancy test 6 days ago. Reporting taking a second 1 and line seemed faint. Patient is concerned that this may mean it is \"a chemical pregnancy.\" Reporting she has symptoms of pregnancy. Denies any spotting abdominal pain or complications.     Per guidelines advised to be seen with in 2 weeks. Caller verbalized understanding. Denies further questions.    Dawn Rdz RN  [facility]      Reason for Disposition    Pregnant    Additional Information    Negative: Sounds like a life-threatening emergency to the triager    Negative: Abdominal pain is present    Negative: Emergency contraception, questions about    Negative: [1] Pregnant AND [2] has IUD    Negative: [1] Pregnant AND [2] prior history of \"ectopic pregnancy\" or previous tubal surgery (e.g., tubal ligation)    Negative: [1] Pregnant AND [2] history of infertility    Negative: Wants a pregnancy test done in the office    Protocols used: MENSTRUAL PERIOD - MISSED OR LATE-A-AH      "

## 2019-10-20 ENCOUNTER — MYC MEDICAL ADVICE (OUTPATIENT)
Dept: FAMILY MEDICINE | Facility: CLINIC | Age: 29
End: 2019-10-20

## 2019-10-20 DIAGNOSIS — O20.0 THREATENED ABORTION: Primary | ICD-10-CM

## 2019-10-21 NOTE — TELEPHONE ENCOUNTER
Please see my chart message below     Please review and advise     Thank you       Ayana Murillo RN, BSN  [facility]

## 2019-10-22 ENCOUNTER — MYC REFILL (OUTPATIENT)
Dept: FAMILY MEDICINE | Facility: CLINIC | Age: 29
End: 2019-10-22

## 2019-10-22 ENCOUNTER — HOSPITAL ENCOUNTER (OUTPATIENT)
Dept: LAB | Facility: CLINIC | Age: 29
Discharge: HOME | End: 2019-10-22
Attending: FAMILY MEDICINE | Admitting: FAMILY MEDICINE
Payer: COMMERCIAL

## 2019-10-22 DIAGNOSIS — G43.109 MIGRAINE WITH AURA AND WITHOUT STATUS MIGRAINOSUS, NOT INTRACTABLE: ICD-10-CM

## 2019-10-22 DIAGNOSIS — R10.2 PELVIC CRAMPING: Primary | ICD-10-CM

## 2019-10-22 DIAGNOSIS — O20.0 THREATENED ABORTION: ICD-10-CM

## 2019-10-22 LAB — B-HCG SERPL-ACNC: 2 IU/L (ref 0–5)

## 2019-10-22 PROCEDURE — 36415 COLL VENOUS BLD VENIPUNCTURE: CPT | Performed by: FAMILY MEDICINE

## 2019-10-22 PROCEDURE — 84702 CHORIONIC GONADOTROPIN TEST: CPT | Performed by: FAMILY MEDICINE

## 2019-10-22 NOTE — TELEPHONE ENCOUNTER
Per chart [facility] and is scheduled for U/S on 10/23/2019.      DAMI Harvey, RN, PHN  [facility]) [phone]

## 2019-10-22 NOTE — TELEPHONE ENCOUNTER
Please call pt.  If pt getting bleeding per vaginum  now -could be period and therefore  might not be viable pregnancy.  Recommend checking serum quantitative  HCG levels 48 hrs apart with call results to me - Please page Dr. Acuña with results [phone]  - and pelvic US as well.    US order  pended. hcg orders futured.    Please inform patient and offer her the above and place orders.

## 2019-10-22 NOTE — TELEPHONE ENCOUNTER
Patient notified by phone.  [facility] to get HCG checked.  She will call radiology to get US scheduled this week hopefully before she leaves town on Friday.  I advised her to call me back if she has any issues with getting US scheduled this week.  Will leave note in triage basket for now to check status later on.        Rosita Hassan, DAMI, RN, N  [facility]) [phone]

## 2019-10-22 NOTE — TELEPHONE ENCOUNTER
"Requested Prescriptions   Pending Prescriptions Disp Refills     SUMAtriptan (IMITREX) 50 MG tablet            Last Written Prescription Date:  11.15.18  Last Fill Quantity: 10 tablet,  # refills: 3   Last office visit: 3/15/2019 with prescribing provider:  Adriana Acuña MD           Future Office Visit:   Next 5 appointments (look out 90 days)    Nov 25, 2019  4:00 PM CST  [facility] [address] [place] [address] [id number] [phone]            10 tablet 3     Sig: Take 1 tab @ onset of migraine, may repeat x 1-2 tabs in 2 hours       Serotonin Agonists Failed - 10/22/2019  7:11 AM        Failed - Serotonin Agonist request needs review.     Please review patient's record. If patient has had 8 or more treatments in the past month, please forward to provider.          Passed - Blood pressure under 140/90 in past 12 months     BP Readings from Last 3 Encounters:   03/15/19 104/62   01/31/19 110/72   11/15/18 108/72                 Passed - Recent (12 mo) or future (30 days) visit within the authorizing provider's specialty     Patient has had an office visit with the authorizing provider or a provider within the authorizing providers department within the previous 12 mos or has a future within next 30 days. See \"Patient Info\" tab in inbasket, or \"Choose Columns\" in Meds & Orders section of the refill encounter.              Passed - Medication is active on med list        Passed - Patient is age 18 or older        Passed - No active pregnancy on record        Passed - No positive pregnancy test in past 12 months        "

## 2019-10-23 NOTE — TELEPHONE ENCOUNTER
Pt is currently pregnant     Please review and advise if refill is okay?     Thank you     Ayana Murillo RN, BSN  [facility]

## 2019-10-23 NOTE — PROGRESS NOTES
Changing order from pregnancy US to pelvic US with transvaginal secondary to HCG level of 2  Today October 22, 2019 .

## 2019-10-23 NOTE — TELEPHONE ENCOUNTER
See result note. HCG level = 2 today = nonpregnant level. See my chart message result note to pt.

## 2019-10-27 RX ORDER — SUMATRIPTAN 50 MG/1
TABLET, FILM COATED ORAL
Qty: 10 TABLET | Refills: 3 | Status: SHIPPED | OUTPATIENT
Start: 2019-10-27 | End: 2020-11-18

## 2019-10-28 NOTE — TELEPHONE ENCOUNTER
Per HCG level from 10/22/2019 = 2, pt is not pregnant at this time. Ok to refill the sumatriptan.

## 2019-11-17 ENCOUNTER — MYC MEDICAL ADVICE (OUTPATIENT)
Dept: FAMILY MEDICINE | Facility: CLINIC | Age: 29
End: 2019-11-17

## 2019-11-17 DIAGNOSIS — Z31.9 INFERTILITY MANAGEMENT: Primary | ICD-10-CM

## 2019-11-18 NOTE — TELEPHONE ENCOUNTER
Forwarded to J.  Please review patient's Mychart message and advise.    Rosita Hassan, BS, RN, PHN  [facility]) [phone]

## 2019-11-20 NOTE — TELEPHONE ENCOUNTER
Recommend referral to ob/gyn specialists in [place] . Referred done.  and  needs to see Urology. Please have him ask his PCP for a referral for this.

## 2020-01-06 ENCOUNTER — OFFICE VISIT (OUTPATIENT)
Dept: FAMILY MEDICINE | Facility: CLINIC | Age: 30
End: 2020-01-06
Payer: COMMERCIAL

## 2020-01-06 VITALS
HEIGHT: 67 IN | BODY MASS INDEX: 23.23 KG/M2 | WEIGHT: 148 LBS | DIASTOLIC BLOOD PRESSURE: 64 MMHG | OXYGEN SATURATION: 97 % | TEMPERATURE: 98.4 F | SYSTOLIC BLOOD PRESSURE: 110 MMHG | HEART RATE: 101 BPM

## 2020-01-06 DIAGNOSIS — M25.571 PAIN IN JOINT, ANKLE AND FOOT, RIGHT: Primary | ICD-10-CM

## 2020-01-06 DIAGNOSIS — M76.61 ACHILLES TENDINITIS OF RIGHT LOWER EXTREMITY: ICD-10-CM

## 2020-01-06 PROCEDURE — 99213 OFFICE O/P EST LOW 20 MIN: CPT | Performed by: NURSE PRACTITIONER

## 2020-01-06 ASSESSMENT — MIFFLIN-ST. JEOR: SCORE: 1416.01

## 2020-01-06 NOTE — PATIENT INSTRUCTIONS
Patient Education     Understanding Achilles Tendonitis    Achilles tendonitis is an overuse injury. It results in inflammation of the Achilles tendon. This tendon is found on the back of the ankle. It links the calf muscle to the heel bone. It helps you do pushing-off movements like running or standing on your toes.     How to say it  uh-KILL-eez ten-dun-I-tis   What causes Achilles tendonitis?  Achilles tendonitis can happen if you do an activity like running, walking, or jumping too much. This overuse can strain, or pull, the tendon. It may lead to minor tearing of the tendon. An injury to the lower leg or foot can also cause it.  If you don t warm up before taking part in sports such as basketball, you are more likely to suffer from this condition. You are also more prone to it if you do too much of such an activity too quickly. Proper training and rest can help prevent it.  Symptoms of Achilles tendonitis  The main symptom of Achilles tendonitis is pain. This pain mostly happens when you move the ankle. The tendon may also feel stiff after a period of no activity, such as sleeping. It may also become swollen. You may hear a crackling sound when you move your ankle.  Treatment for Achilles tendonitis  Symptoms often get better after starting treatment. A full recovery may take several months. Treatments include:    Rest. You should stop or change the activity that caused the injury. The tendon will then have time to heal.    Cold or heat pack. These help reduce pain and swelling.    Prescription or over-the-counter pain medicines. These help reduce pain and swelling.    Shoe inserts. These devices can reduce strain on the Achilles tendon when you move. You may then feel less pain.    Stretching and strengthening exercises. Certain exercises can help you regain flexibility and strength in your Achilles tendon.    Surgery. This option can fix the injured tendon. But you don t often need it unless other  treatments don t work.     When to call your healthcare provider   Call your healthcare provider right away if you have any of these:    Fever of 100.4 F (38 C) or higher, or as directed    Pain that gets worse    Symptoms that don t get better, or get worse    New symptoms    Date Last Reviewed: 3/10/2016    [id number] [employer]. [address] [place] [address], [place] [id number]. All rights reserved. This information is not intended as a substitute for professional medical care. Always follow your healthcare professional's instructions.

## 2020-01-06 NOTE — PROGRESS NOTES
"Subjective   Allison J Bazinet is a 29 year old female who presents to clinic today for the following health issues:    HPI   Joint Pain    Onset: x3 weeks    Description:   Location: right ankle,achillies  Character: cannot describe pain    Intensity: 7/10 - just when walking - intermittent  After walking a couple blocks    Progression of Symptoms: better, flared up again last weekend    Accompanying Signs & Symptoms:  Other symptoms: none    History:   Previous similar pain: no       Precipitating factors:   Trauma or overuse: YES- walking a lot on vacation    Alleviating factors:  Improved by: rest/inactivity    Therapies Tried and outcome: heat - no relief    Patient is 7 weeks pregnant.  States she did a bunch of walking in boots and now has right Achilles pain.  Denies any chest pain shortness of breath calf tenderness.  Denies history of blood clot.  Has tried heat without much relief.  Is unable to take NSAIDs due to pregnancy.  She states it has improved today.    Reviewed and updated as needed this visit by provider:  Tobacco  Allergies  Meds  Problems  Med Hx  Surg Hx  Fam Hx         Review of Systems   Constitutional, HEENT, cardiovascular, pulmonary, GI, , musculoskeletal, neuro, skin, endocrine and psych systems are negative, except as otherwise noted in the HPI.          Objective   /64 (BP Location: Left arm, Patient Position: Chair, Cuff Size: Adult Regular)   Pulse 101   Temp 98.4  F (36.9  C) (Oral)   Ht 1.689 m (5' 6.5\")   Wt 67.1 kg (148 lb)   LMP 02/21/2019   SpO2 97%   Breastfeeding No   BMI 23.53 kg/m   Body mass index is 23.53 kg/m .  Physical Exam   GENERAL: healthy, alert, well nourished, well hydrated, no distress  RESP: lungs clear to auscultation - no rales, no rhonchi, no wheezes  CV: regular rates and rhythm, normal S1 S2, no S3 or S4 and no murmur, no click or rub -  MS: extremities- no gross deformities noted, no edema        Assessment & Plan   Audrey was " seen today for musculoskeletal problem.    Diagnoses and all orders for this visit:    Pain in joint, ankle and foot, right  Achilles tendinitis of right lower extremity  Symptoms most consistent with Achilles tendinitis.  Exam normal today.  Unable to take NSAIDs due to pregnancy.  Encourage rest ice and Tylenol as needed for pain.  I recommend against overuse at this time will tendon is healing.  Discussed podiatry referral if symptoms persist she will let me know if she needs this and I will send the referral at that time.      See Patient Instructions    Return if symptoms worsen or fail to improve.     Carmen De La Torre, ERLINDAP-[address] [address]  [email or web address]   Office: [phone]

## 2020-01-28 LAB
HBV SURFACE AG SERPL QL IA: NEGATIVE
HIV 1+2 AB+HIV1 P24 AG SERPL QL IA: NON REACTIVE
RUBELLA ABY IGG: NORMAL

## 2020-03-11 ENCOUNTER — HEALTH MAINTENANCE LETTER (OUTPATIENT)
Age: 30
End: 2020-03-11

## 2020-07-23 LAB — GROUP B STREP PCR: NEGATIVE

## 2020-08-13 ENCOUNTER — HOSPITAL ENCOUNTER (OUTPATIENT)
Facility: CLINIC | Age: 30
Discharge: HOME | End: 2020-08-13
Attending: OBSTETRICS & GYNECOLOGY | Admitting: OBSTETRICS & GYNECOLOGY
Payer: COMMERCIAL

## 2020-08-13 ENCOUNTER — APPOINTMENT (OUTPATIENT)
Dept: ULTRASOUND IMAGING | Facility: CLINIC | Age: 30
End: 2020-08-13
Attending: OBSTETRICS & GYNECOLOGY
Payer: COMMERCIAL

## 2020-08-13 VITALS
RESPIRATION RATE: 16 BRPM | TEMPERATURE: 100 F | HEIGHT: 66 IN | SYSTOLIC BLOOD PRESSURE: 121 MMHG | DIASTOLIC BLOOD PRESSURE: 78 MMHG | HEART RATE: 100 BPM | BODY MASS INDEX: 26.2 KG/M2 | WEIGHT: 163 LBS

## 2020-08-13 DIAGNOSIS — O13.9 PIH (PREGNANCY INDUCED HYPERTENSION): Primary | ICD-10-CM

## 2020-08-13 LAB
ALT SERPL W P-5'-P-CCNC: 12 U/L (ref 0–50)
ANION GAP SERPL CALCULATED.3IONS-SCNC: 7 MMOL/L (ref 3–14)
AST SERPL W P-5'-P-CCNC: 19 U/L (ref 0–45)
BUN SERPL-MCNC: 11 MG/DL (ref 7–30)
CALCIUM SERPL-MCNC: 9.1 MG/DL (ref 8.5–10.1)
CHLORIDE SERPL-SCNC: 107 MMOL/L (ref 94–109)
CO2 SERPL-SCNC: 21 MMOL/L (ref 20–32)
CREAT SERPL-MCNC: 0.86 MG/DL (ref 0.52–1.04)
CREAT UR-MCNC: 65 MG/DL
ERYTHROCYTE [DISTWIDTH] IN BLOOD BY AUTOMATED COUNT: 13.2 % (ref 10–15)
GFR SERPL CREATININE-BSD FRML MDRD: >90 ML/MIN/{1.73_M2}
GLUCOSE SERPL-MCNC: 105 MG/DL (ref 70–99)
HCT VFR BLD AUTO: 34.1 % (ref 35–47)
HGB BLD-MCNC: 10.8 G/DL (ref 11.7–15.7)
MCH RBC QN AUTO: 27.6 PG (ref 26.5–33)
MCHC RBC AUTO-ENTMCNC: 31.7 G/DL (ref 31.5–36.5)
MCV RBC AUTO: 87 FL (ref 78–100)
PLATELET # BLD AUTO: 277 10E9/L (ref 150–450)
POTASSIUM SERPL-SCNC: 3.9 MMOL/L (ref 3.4–5.3)
PROT UR-MCNC: 0.14 G/L
PROT/CREAT 24H UR: 0.22 G/G CR (ref 0–0.2)
RBC # BLD AUTO: 3.92 10E12/L (ref 3.8–5.2)
SODIUM SERPL-SCNC: 135 MMOL/L (ref 133–144)
URATE SERPL-MCNC: 5.6 MG/DL (ref 2.6–6)
WBC # BLD AUTO: 11 10E9/L (ref 4–11)

## 2020-08-13 PROCEDURE — 84450 TRANSFERASE (AST) (SGOT): CPT | Performed by: OBSTETRICS & GYNECOLOGY

## 2020-08-13 PROCEDURE — 85027 COMPLETE CBC AUTOMATED: CPT | Performed by: OBSTETRICS & GYNECOLOGY

## 2020-08-13 PROCEDURE — 84550 ASSAY OF BLOOD/URIC ACID: CPT | Performed by: OBSTETRICS & GYNECOLOGY

## 2020-08-13 PROCEDURE — 84460 ALANINE AMINO (ALT) (SGPT): CPT | Performed by: OBSTETRICS & GYNECOLOGY

## 2020-08-13 PROCEDURE — 80048 BASIC METABOLIC PNL TOTAL CA: CPT | Performed by: OBSTETRICS & GYNECOLOGY

## 2020-08-13 PROCEDURE — 76815 OB US LIMITED FETUS(S): CPT

## 2020-08-13 PROCEDURE — 84156 ASSAY OF PROTEIN URINE: CPT | Performed by: OBSTETRICS & GYNECOLOGY

## 2020-08-13 PROCEDURE — 36415 COLL VENOUS BLD VENIPUNCTURE: CPT | Performed by: OBSTETRICS & GYNECOLOGY

## 2020-08-13 PROCEDURE — G0463 HOSPITAL OUTPT CLINIC VISIT: HCPCS

## 2020-08-13 ASSESSMENT — MIFFLIN-ST. JEOR: SCORE: 1476.11

## 2020-08-13 NOTE — PLAN OF CARE
All printed out discharge instructions verbally reviewed and agreed upon, dressed self to go and discharged ambulatory in good spirits, will follow up with clinic who plan to arrange an induction at 39 weeks

## 2020-08-13 NOTE — PLAN OF CARE
Data: Patient assessed in the [facility] for R/O PIH sent from clinic.  Cervical exam done at office  Membranes intact.  Contractions not feeling, baby active, patient denies any headache, visual changes, no epigastric discomfort, no edema  Action:  Serial BP's labs ordered, EFM/EUM on  moderate variability, + accelerations no decelerations, rare contraction patient not feeling them  Response: Orders  per Valentin Mclaughlin. Dr Stephany Moy Given phone update on results and US ordered for MICHAEL, order placed and patient down to US in wheelchair, Patient verbalized understanding of education and verbalized agreement with plan.

## 2020-08-13 NOTE — DISCHARGE INSTRUCTIONS
Discharge Instruction for Undelivered Patients      You were seen for: rule out pregnancy induced hypertension  We Consulted: Dr TOMÁS Moy  You had (Test or Medicine): serial BP's , Fetal monitoring, lab work and US    Diet:   Regular diet     Activity:  Normal activities    Call your provider if you notice:  Swelling in your face or increased swelling in your hands or legs.  Headaches that are not relieved by Tylenol (acetaminophen).  Changes in your vision (blurring: seeing spots or stars.)  Nausea (sick to your stomach) and vomiting (throwing up).  Heartburn that doesn't go away.  Signs of bladder infection: pain when you urinate (use the toilet), need to go more often and more urgently.  The bag of michelle (rupture of membranes) breaks, or you notice leaking in your underwear.  Bright red blood in your underwear.  Abdominal (lower belly) or stomach pain.  For first baby: Contractions (tightening) less than 5 minutes apart for one hour or more.  Increase or change in vaginal discharge (note the color and amount)      Follow-up:  With scheduled visit

## 2020-08-15 LAB
SARS-COV-2 RNA SPEC QL NAA+PROBE: NOT DETECTED
SPECIMEN SOURCE: NORMAL

## 2020-08-16 ENCOUNTER — ANESTHESIA EVENT (OUTPATIENT)
Dept: OBGYN | Facility: CLINIC | Age: 30
End: 2020-08-16
Payer: COMMERCIAL

## 2020-08-16 ENCOUNTER — ANESTHESIA (OUTPATIENT)
Dept: OBGYN | Facility: CLINIC | Age: 30
End: 2020-08-16
Payer: COMMERCIAL

## 2020-08-16 ENCOUNTER — HOSPITAL ENCOUNTER (INPATIENT)
Facility: CLINIC | Age: 30
LOS: 3 days | Discharge: HOME HEALTH | End: 2020-08-19
Attending: OBSTETRICS & GYNECOLOGY | Admitting: OBSTETRICS & GYNECOLOGY
Payer: COMMERCIAL

## 2020-08-16 LAB
ABO + RH BLD: NORMAL
ABO + RH BLD: NORMAL
ALT SERPL W P-5'-P-CCNC: 12 U/L (ref 0–50)
AST SERPL W P-5'-P-CCNC: 20 U/L (ref 0–45)
CREAT SERPL-MCNC: 0.92 MG/DL (ref 0.52–1.04)
ERYTHROCYTE [DISTWIDTH] IN BLOOD BY AUTOMATED COUNT: 13.4 % (ref 10–15)
GFR SERPL CREATININE-BSD FRML MDRD: 83 ML/MIN/{1.73_M2}
HCT VFR BLD AUTO: 32.6 % (ref 35–47)
HGB BLD-MCNC: 10.4 G/DL (ref 11.7–15.7)
MCH RBC QN AUTO: 27.6 PG (ref 26.5–33)
MCHC RBC AUTO-ENTMCNC: 31.9 G/DL (ref 31.5–36.5)
MCV RBC AUTO: 87 FL (ref 78–100)
PLATELET # BLD AUTO: 269 10E9/L (ref 150–450)
RBC # BLD AUTO: 3.77 10E12/L (ref 3.8–5.2)
SPECIMEN EXP DATE BLD: NORMAL
WBC # BLD AUTO: 11.8 10E9/L (ref 4–11)

## 2020-08-16 PROCEDURE — 86780 TREPONEMA PALLIDUM: CPT | Performed by: OBSTETRICS & GYNECOLOGY

## 2020-08-16 PROCEDURE — 84460 ALANINE AMINO (ALT) (SGPT): CPT | Performed by: OBSTETRICS & GYNECOLOGY

## 2020-08-16 PROCEDURE — 3E0P7VZ INTRODUCTION OF HORMONE INTO FEMALE REPRODUCTIVE, VIA NATURAL OR ARTIFICIAL OPENING: ICD-10-PCS | Performed by: OBSTETRICS & GYNECOLOGY

## 2020-08-16 PROCEDURE — 85027 COMPLETE CBC AUTOMATED: CPT | Performed by: OBSTETRICS & GYNECOLOGY

## 2020-08-16 PROCEDURE — 37000011 ZZH ANESTHESIA WARD SERVICE

## 2020-08-16 PROCEDURE — 84450 TRANSFERASE (AST) (SGOT): CPT | Performed by: OBSTETRICS & GYNECOLOGY

## 2020-08-16 PROCEDURE — 25000132 ZZH RX MED GY IP 250 OP 250 PS 637: Performed by: OBSTETRICS & GYNECOLOGY

## 2020-08-16 PROCEDURE — 12000000 ZZH R&B MED SURG/OB

## 2020-08-16 PROCEDURE — 86900 BLOOD TYPING SEROLOGIC ABO: CPT | Performed by: OBSTETRICS & GYNECOLOGY

## 2020-08-16 PROCEDURE — 82565 ASSAY OF CREATININE: CPT | Performed by: OBSTETRICS & GYNECOLOGY

## 2020-08-16 PROCEDURE — 40000671 ZZH STATISTIC ANESTHESIA CASE

## 2020-08-16 PROCEDURE — 86901 BLOOD TYPING SEROLOGIC RH(D): CPT | Performed by: OBSTETRICS & GYNECOLOGY

## 2020-08-16 RX ORDER — NALBUPHINE HYDROCHLORIDE 20 MG/ML
2.5-5 INJECTION, SOLUTION INTRAMUSCULAR; INTRAVENOUS; SUBCUTANEOUS EVERY 6 HOURS PRN
Status: DISCONTINUED | OUTPATIENT
Start: 2020-08-16 | End: 2020-08-17

## 2020-08-16 RX ORDER — CARBOPROST TROMETHAMINE 250 UG/ML
250 INJECTION, SOLUTION INTRAMUSCULAR
Status: DISCONTINUED | OUTPATIENT
Start: 2020-08-16 | End: 2020-08-17

## 2020-08-16 RX ORDER — NALOXONE HYDROCHLORIDE 0.4 MG/ML
.1-.4 INJECTION, SOLUTION INTRAMUSCULAR; INTRAVENOUS; SUBCUTANEOUS
Status: DISCONTINUED | OUTPATIENT
Start: 2020-08-16 | End: 2020-08-17

## 2020-08-16 RX ORDER — TRANEXAMIC ACID 10 MG/ML
1 INJECTION, SOLUTION INTRAVENOUS EVERY 30 MIN PRN
Status: DISCONTINUED | OUTPATIENT
Start: 2020-08-16 | End: 2020-08-17

## 2020-08-16 RX ORDER — FENTANYL CITRATE 50 UG/ML
50-100 INJECTION, SOLUTION INTRAMUSCULAR; INTRAVENOUS
Status: DISCONTINUED | OUTPATIENT
Start: 2020-08-16 | End: 2020-08-17

## 2020-08-16 RX ORDER — IBUPROFEN 800 MG/1
800 TABLET, FILM COATED ORAL
Status: COMPLETED | OUTPATIENT
Start: 2020-08-16 | End: 2020-08-17

## 2020-08-16 RX ORDER — ZOLPIDEM TARTRATE 5 MG/1
5 TABLET ORAL
Status: DISCONTINUED | OUTPATIENT
Start: 2020-08-16 | End: 2020-08-17

## 2020-08-16 RX ORDER — ACETAMINOPHEN 325 MG/1
650 TABLET ORAL EVERY 4 HOURS PRN
Status: DISCONTINUED | OUTPATIENT
Start: 2020-08-16 | End: 2020-08-17

## 2020-08-16 RX ORDER — OXYTOCIN/0.9 % SODIUM CHLORIDE 30/500 ML
100-340 PLASTIC BAG, INJECTION (ML) INTRAVENOUS CONTINUOUS PRN
Status: COMPLETED | OUTPATIENT
Start: 2020-08-16 | End: 2020-08-17

## 2020-08-16 RX ORDER — EPHEDRINE SULFATE 50 MG/ML
5 INJECTION, SOLUTION INTRAMUSCULAR; INTRAVENOUS; SUBCUTANEOUS
Status: DISCONTINUED | OUTPATIENT
Start: 2020-08-16 | End: 2020-08-17

## 2020-08-16 RX ORDER — ONDANSETRON 2 MG/ML
4 INJECTION INTRAMUSCULAR; INTRAVENOUS EVERY 6 HOURS PRN
Status: DISCONTINUED | OUTPATIENT
Start: 2020-08-16 | End: 2020-08-17

## 2020-08-16 RX ORDER — OXYTOCIN 10 [USP'U]/ML
10 INJECTION, SOLUTION INTRAMUSCULAR; INTRAVENOUS
Status: DISCONTINUED | OUTPATIENT
Start: 2020-08-16 | End: 2020-08-17

## 2020-08-16 RX ORDER — NALOXONE HYDROCHLORIDE 0.4 MG/ML
.1-.4 INJECTION, SOLUTION INTRAMUSCULAR; INTRAVENOUS; SUBCUTANEOUS
Status: DISCONTINUED | OUTPATIENT
Start: 2020-08-16 | End: 2020-08-16

## 2020-08-16 RX ORDER — METHYLERGONOVINE MALEATE 0.2 MG/ML
200 INJECTION INTRAVENOUS
Status: COMPLETED | OUTPATIENT
Start: 2020-08-16 | End: 2020-08-17

## 2020-08-16 RX ORDER — SODIUM CHLORIDE, SODIUM LACTATE, POTASSIUM CHLORIDE, CALCIUM CHLORIDE 600; 310; 30; 20 MG/100ML; MG/100ML; MG/100ML; MG/100ML
INJECTION, SOLUTION INTRAVENOUS CONTINUOUS
Status: DISCONTINUED | OUTPATIENT
Start: 2020-08-16 | End: 2020-08-17

## 2020-08-16 RX ORDER — OXYCODONE AND ACETAMINOPHEN 5; 325 MG/1; MG/1
1 TABLET ORAL
Status: DISCONTINUED | OUTPATIENT
Start: 2020-08-16 | End: 2020-08-17

## 2020-08-16 RX ADMIN — ZOLPIDEM TARTRATE 5 MG: 5 TABLET, COATED ORAL at 22:38

## 2020-08-16 RX ADMIN — DINOPROSTONE 10 MG: 10 INSERT VAGINAL at 21:20

## 2020-08-17 LAB — T PALLIDUM AB SER QL: NONREACTIVE

## 2020-08-17 PROCEDURE — 25000132 ZZH RX MED GY IP 250 OP 250 PS 637: Performed by: OBSTETRICS & GYNECOLOGY

## 2020-08-17 PROCEDURE — 0UC97ZZ EXTIRPATION OF MATTER FROM UTERUS, VIA NATURAL OR ARTIFICIAL OPENING: ICD-10-PCS | Performed by: OBSTETRICS & GYNECOLOGY

## 2020-08-17 PROCEDURE — 25000128 H RX IP 250 OP 636: Performed by: ANESTHESIOLOGY

## 2020-08-17 PROCEDURE — 25000125 ZZHC RX 250: Performed by: OBSTETRICS & GYNECOLOGY

## 2020-08-17 PROCEDURE — 0HQ9XZZ REPAIR PERINEUM SKIN, EXTERNAL APPROACH: ICD-10-PCS | Performed by: OBSTETRICS & GYNECOLOGY

## 2020-08-17 PROCEDURE — 25000128 H RX IP 250 OP 636: Performed by: OBSTETRICS & GYNECOLOGY

## 2020-08-17 PROCEDURE — 12000000 ZZH R&B MED SURG/OB

## 2020-08-17 PROCEDURE — 25800030 ZZH RX IP 258 OP 636: Performed by: OBSTETRICS & GYNECOLOGY

## 2020-08-17 PROCEDURE — 00HU33Z INSERTION OF INFUSION DEVICE INTO SPINAL CANAL, PERCUTANEOUS APPROACH: ICD-10-PCS | Performed by: ANESTHESIOLOGY

## 2020-08-17 PROCEDURE — 72200001 ZZH LABOR CARE VAGINAL DELIVERY SINGLE

## 2020-08-17 PROCEDURE — 3E0R3BZ INTRODUCTION OF ANESTHETIC AGENT INTO SPINAL CANAL, PERCUTANEOUS APPROACH: ICD-10-PCS | Performed by: ANESTHESIOLOGY

## 2020-08-17 RX ORDER — FENTANYL CITRATE 50 UG/ML
100 INJECTION, SOLUTION INTRAMUSCULAR; INTRAVENOUS ONCE
Status: COMPLETED | OUTPATIENT
Start: 2020-08-17 | End: 2020-08-17

## 2020-08-17 RX ORDER — OXYTOCIN/0.9 % SODIUM CHLORIDE 30/500 ML
100 PLASTIC BAG, INJECTION (ML) INTRAVENOUS CONTINUOUS
Status: DISCONTINUED | OUTPATIENT
Start: 2020-08-17 | End: 2020-08-19 | Stop reason: HOSPADM

## 2020-08-17 RX ORDER — PRENATAL VIT/IRON FUM/FOLIC AC 27MG-0.8MG
1 TABLET ORAL DAILY
Status: DISCONTINUED | OUTPATIENT
Start: 2020-08-17 | End: 2020-08-19 | Stop reason: HOSPADM

## 2020-08-17 RX ORDER — AMOXICILLIN 250 MG
2 CAPSULE ORAL 2 TIMES DAILY
Status: DISCONTINUED | OUTPATIENT
Start: 2020-08-17 | End: 2020-08-19 | Stop reason: HOSPADM

## 2020-08-17 RX ORDER — TRANEXAMIC ACID 10 MG/ML
1 INJECTION, SOLUTION INTRAVENOUS EVERY 30 MIN PRN
Status: DISCONTINUED | OUTPATIENT
Start: 2020-08-17 | End: 2020-08-19 | Stop reason: HOSPADM

## 2020-08-17 RX ORDER — NALOXONE HYDROCHLORIDE 0.4 MG/ML
.1-.4 INJECTION, SOLUTION INTRAMUSCULAR; INTRAVENOUS; SUBCUTANEOUS
Status: DISCONTINUED | OUTPATIENT
Start: 2020-08-17 | End: 2020-08-19 | Stop reason: HOSPADM

## 2020-08-17 RX ORDER — BISACODYL 10 MG
10 SUPPOSITORY, RECTAL RECTAL DAILY PRN
Status: DISCONTINUED | OUTPATIENT
Start: 2020-08-19 | End: 2020-08-19 | Stop reason: HOSPADM

## 2020-08-17 RX ORDER — OXYTOCIN 10 [USP'U]/ML
10 INJECTION, SOLUTION INTRAMUSCULAR; INTRAVENOUS
Status: DISCONTINUED | OUTPATIENT
Start: 2020-08-17 | End: 2020-08-19 | Stop reason: HOSPADM

## 2020-08-17 RX ORDER — CARBOPROST TROMETHAMINE 250 UG/ML
250 INJECTION, SOLUTION INTRAMUSCULAR
Status: DISCONTINUED | OUTPATIENT
Start: 2020-08-17 | End: 2020-08-19 | Stop reason: HOSPADM

## 2020-08-17 RX ORDER — IBUPROFEN 800 MG/1
800 TABLET, FILM COATED ORAL EVERY 6 HOURS PRN
Status: DISCONTINUED | OUTPATIENT
Start: 2020-08-17 | End: 2020-08-19 | Stop reason: HOSPADM

## 2020-08-17 RX ORDER — AMOXICILLIN 250 MG
1 CAPSULE ORAL 2 TIMES DAILY
Status: DISCONTINUED | OUTPATIENT
Start: 2020-08-17 | End: 2020-08-19 | Stop reason: HOSPADM

## 2020-08-17 RX ORDER — ACETAMINOPHEN 325 MG/1
650 TABLET ORAL EVERY 4 HOURS PRN
Status: DISCONTINUED | OUTPATIENT
Start: 2020-08-17 | End: 2020-08-19 | Stop reason: HOSPADM

## 2020-08-17 RX ORDER — OXYTOCIN/0.9 % SODIUM CHLORIDE 30/500 ML
340 PLASTIC BAG, INJECTION (ML) INTRAVENOUS CONTINUOUS PRN
Status: DISCONTINUED | OUTPATIENT
Start: 2020-08-17 | End: 2020-08-19 | Stop reason: HOSPADM

## 2020-08-17 RX ORDER — HYDROCORTISONE 2.5 %
CREAM (GRAM) TOPICAL 3 TIMES DAILY PRN
Status: DISCONTINUED | OUTPATIENT
Start: 2020-08-17 | End: 2020-08-19 | Stop reason: HOSPADM

## 2020-08-17 RX ORDER — MISOPROSTOL 200 UG/1
800 TABLET ORAL
Status: DISCONTINUED | OUTPATIENT
Start: 2020-08-17 | End: 2020-08-19 | Stop reason: HOSPADM

## 2020-08-17 RX ORDER — MODIFIED LANOLIN
OINTMENT (GRAM) TOPICAL
Status: DISCONTINUED | OUTPATIENT
Start: 2020-08-17 | End: 2020-08-19 | Stop reason: HOSPADM

## 2020-08-17 RX ADMIN — METHYLERGONOVINE MALEATE 200 MCG: 0.2 INJECTION, SOLUTION INTRAMUSCULAR; INTRAVENOUS at 12:01

## 2020-08-17 RX ADMIN — SODIUM CHLORIDE, POTASSIUM CHLORIDE, SODIUM LACTATE AND CALCIUM CHLORIDE: 600; 310; 30; 20 INJECTION, SOLUTION INTRAVENOUS at 07:02

## 2020-08-17 RX ADMIN — FENTANYL CITRATE 50 MCG: 50 INJECTION, SOLUTION INTRAMUSCULAR; INTRAVENOUS at 02:49

## 2020-08-17 RX ADMIN — Medication 340 ML/HR: at 12:00

## 2020-08-17 RX ADMIN — IBUPROFEN 800 MG: 800 TABLET, FILM COATED ORAL at 12:27

## 2020-08-17 RX ADMIN — FENTANYL CITRATE 100 MCG: 50 INJECTION, SOLUTION INTRAMUSCULAR; INTRAVENOUS at 07:25

## 2020-08-17 RX ADMIN — IBUPROFEN 800 MG: 800 TABLET, FILM COATED ORAL at 21:02

## 2020-08-17 RX ADMIN — FENTANYL CITRATE 100 MCG: 50 INJECTION, SOLUTION INTRAMUSCULAR; INTRAVENOUS at 04:58

## 2020-08-17 RX ADMIN — IBUPROFEN 800 MG: 800 TABLET, FILM COATED ORAL at 14:54

## 2020-08-17 RX ADMIN — Medication 10 ML/HR: at 07:29

## 2020-08-17 RX ADMIN — FENTANYL CITRATE 100 MCG: 50 INJECTION, SOLUTION INTRAMUSCULAR; INTRAVENOUS at 03:50

## 2020-08-17 RX ADMIN — DOCUSATE SODIUM AND SENNOSIDES 1 TABLET: 8.6; 5 TABLET ORAL at 21:02

## 2020-08-17 RX ADMIN — SODIUM CHLORIDE, POTASSIUM CHLORIDE, SODIUM LACTATE AND CALCIUM CHLORIDE 500 ML: 600; 310; 30; 20 INJECTION, SOLUTION INTRAVENOUS at 05:20

## 2020-08-17 NOTE — PLAN OF CARE
Pt s/p .  VSS and assessment WNL.  Pt voiding without difficulty.  Pain currently well controlled.  Breastfeeding infant with assistance; occ sleepy at breast.  Positive bonding observed with infant; encouraged STS.  Pt spouse at bedside, supportive of couplet.  Pt stable; continue with current plan of care.

## 2020-08-17 NOTE — PROVIDER NOTIFICATION
08/16/20 2038   Provider Notification   Provider Name/Title Dr. Goetz   Method of Notification Phone   Request Evaluate - Remote   Notification Reason Patient Arrived;SVE;Uterine Activity     MD notified of Pt arrival. Here for cervical ripening with gestational hypertension. Contractions every 2-7 minutes, palpating mild, Pt not feeling. FHR category 1. SVE 1/65/-1, Villa score of 7. Orders received for cervical ripening with Cervidil insert. Intrapartum orders received TORB. /76, reflexes normal, 1 beat of clonus on right side. Pt has history of migraines and has had one in the last week, denies other symptoms of preeclampsia. MD states to collect hypertension evaluation labs, ALT, AST, creatinine, CBC, no urine needed. RN will update MD with any abnormal labs. Pt may have 5 mg PO Ambien for sleep. Breakfast in the morning ok if Pt is not uncomfortable.

## 2020-08-17 NOTE — PROVIDER NOTIFICATION
08/17/20 0625   Provider Notification   Provider Name/Title Dr. Lagos   Method of Notification Phone   Request Evaluate in Person   Notification Reason SVE;Status Update;Pain     MD calling to unit for update. Cervidil removed at 0602 due to uterine tachysystole. SVE 2/80/-1, Villa score of 8. Pt plans to receive an epidural for pain management in labor, and Pt has become significantly more uncomfortable in the last two hours and has been using IV Fentanyl. MD states she will plan to AROM this morning at 0800, Pt may receive an epidural now if requesting.

## 2020-08-17 NOTE — H&P
No significant change in general health status based on examination of the patient, review of Nursing Admission Database and prenatal record. 28yo  admitted for induction of labor due to gestational hypertension. Received Cervidil overnight, and is now painfully curtis spontaneously every 2-3 minutes.     FHT--category 2. Baseline 140, moderate LTV, late decelerations noted after <50% of contractions.   SVE--3/80/-1, SROM noted    EFW: 7lb 8oz     Paulette Lagos MD

## 2020-08-17 NOTE — PROVIDER NOTIFICATION
08/17/20 0906   Epidural Placement Care   Epidural Request/Placement patient positioned;provider notified for redose   Patient with pain at 6/10 all left sided.  Patient had given 2 independent boluses and had remained on the left side with no relief.  MDA coming to bolus

## 2020-08-17 NOTE — PROVIDER NOTIFICATION
08/17/20 0610   Provider Notification   Provider Name/Title Dr. Goetz   Method of Notification Phone   Request Evaluate - Remote   Notification Reason SVE;Status Update;Uterine Activity;Pain     MD updated due to uterine tachysystole. Tachysystole unresolved following interventions of repositioning, emptying her bladder, and 500 mL LR fluid bolus. Cervidil removed at 0602. SVE 2/80/-1, Villa score of 8. Pt using IV Fentanyl for pain management with good relief. FHR with primarily minimal variability over the last 1.5 hours. Dr. Lagos will be assuming cares this morning.

## 2020-08-17 NOTE — PROVIDER NOTIFICATION
08/17/20 1026   Comments   Comments Dr. Younger checked in on rebolus results   MDA here to check on Patient given report that patient was pain free and now resting after minor panic attack related to numb feeling

## 2020-08-17 NOTE — PLAN OF CARE
Data: Patient presented to [facility]: 2020  7:35 PM.  Patient admitted for induction for gestational hypertension. Patient is a .  Prenatal record reviewed. Pregnancy has been complicated by gestational hypertension.  Gestational Age 39w0d. VSS. Fetal movement present. Patient denies leaking of vaginal fluid/rupture of membranes, vaginal bleeding, abdominal pain, pelvic pressure, nausea, vomiting, headache, visual disturbances, epigastric or URQ pain, significant edema. Support person is present.   Action: Verbal consent for EFM.  Admission assessment completed. Bill of rights reviewed.  Response: Patient verbalized agreement with plan. Will contact Dr George Goetz with update and further orders.

## 2020-08-17 NOTE — ANESTHESIA PREPROCEDURE EVALUATION
Anesthesia Pre-Procedure Evaluation    Patient: Allison J Bazinet   MRN: [id number] : 1990          Preoperative Diagnosis: * No surgery found *        Past Medical History:   Diagnosis Date     History of chicken pox      IUD (intrauterine device) in place 2014    has appt for replacement 2019 with [facility]       Migraine with aura and without status migrainosus, not intractable 11/15/2018     PIH (pregnancy induced hypertension) 2020     PNA (pneumonia) 2016    LLL     PPD screening test     Test was negative, she had exposure in [place]     Past Surgical History:   Procedure Laterality Date     LUMPECTOMY BREAST Right     Benign, done in [place]     wisdom teeth extraction - all 4        Anesthesia Evaluation       history and physical reviewed .             ROS/MED HX    ENT/Pulmonary:  - neg pulmonary ROS     Neurologic:  - neg neurologic ROS     Cardiovascular:     (+) --PIH, --. : . . . :. .       METS/Exercise Tolerance:     Hematologic:         Musculoskeletal:         GI/Hepatic:  - neg GI/hepatic ROS       Renal/Genitourinary:         Endo:         Psychiatric:         Infectious Disease:         Malignancy:         Other:                     neg OB ROS            Physical Exam      Airway     Dental     Cardiovascular       Pulmonary     Other findings:  at term, in labor, requesting pain  management        Lab Results   Component Value Date    WBC 11.8 (H) 2020    HGB 10.4 (L) 2020    HCT 32.6 (L) 2020     2020     2020    POTASSIUM 3.9 2020    CHLORIDE 107 2020    CO2 21 2020    BUN 11 2020    CR 0.92 2020     (H) 2020    XOCHITL 9.1 2020    ALT 12 2020    AST 20 2020       Preop Vitals  BP Readings from Last 3 Encounters:   20 107/63   20 121/78   20 110/64    Pulse Readings from Last 3 Encounters:   20 100   20 101   03/15/19 104      Resp  "Readings from Last 3 Encounters:   08/17/20 16   08/13/20 16    SpO2 Readings from Last 3 Encounters:   08/17/20 98%   01/06/20 97%   03/15/19 99%      Temp Readings from Last 1 Encounters:   08/17/20 97.9  F (36.6  C) (Oral)    Ht Readings from Last 1 Encounters:   08/13/20 1.676 m (5' 6\")      Wt Readings from Last 1 Encounters:   08/13/20 73.9 kg (163 lb)    Estimated body mass index is 26.31 kg/m  as calculated from the following:    Height as of 8/13/20: 1.676 m (5' 6\").    Weight as of 8/13/20: 73.9 kg (163 lb).       Anesthesia Plan      History & Physical Review      ASA Status:  2 .  OB Epidural Asa: 2       Plan for Epidural     Discussed risks of epidural catheter placement, including, but not limited to, bruising/bleeding, infection, pain, failure of epidural medications to relieve pain, dural puncture with subsequent headache/ need for epidural blood patch and nerve damage.  All questions answered, understanding voiced and she wishes to proceed.        Postoperative Care      Consents  Anesthetic plan, risks, benefits and alternatives discussed with:  Patient..                 Jose Roberto Chairez MD                    .  "

## 2020-08-17 NOTE — PROVIDER NOTIFICATION
08/17/20 0956   Comments   Comments feels very anxious about numbness in legs   Encouraged to use yoga breath, music and aroma therapy to relax and pt responded well to interventions

## 2020-08-17 NOTE — L&D DELIVERY NOTE
of viable infant female, see dictated summary. Mild atony immediately after delivery treated with Methergine 0.2mg once.     Paulette Lagos MD

## 2020-08-17 NOTE — PLAN OF CARE
Patients mobililty level scored using the bedside mobility assistance tool (BMAT). Patient is at a mobility level test number: 4. Mobility equipment used: none required. Required assist of 1 staff members. Further use of BMAT scoring not required.Data: Patient voided without concern after delivery.   Patient transferred to [facility] via wheelchair at 1420.  Baby transferred via parent's arms.  Action: Receiving unit notified of transfer: Yes. Patient and family notified of room change. Report given to Ashley at 1515. Belongings sent to receiving unit. Accompanied by Registered Nurse. Oriented patient to surroundings. Call light within reach. ID bands double-checked with receiving RN.  Response: Patient tolerated transfer and is stable.

## 2020-08-17 NOTE — ANESTHESIA PROCEDURE NOTES
Procedure note : epidural catheter  Staff -   Anesthesiologist:  Jose Roberto Chairez MD      Performed By: anesthesiologist        Pre-Procedure  Performed by Jose Roberto Chairez MD  Location: OB, floor    Procedure Times:8/17/2020 7:12 AM and 8/17/2020 7:26 AM  Pre-Anesthestic Checklist: patient identified, IV checked, risks and benefits discussed, informed consent, monitors and equipment checked, pre-op evaluation and at physician/surgeon's request    Timeout  Correct Patient: Yes   Correct Procedure: Yes   Correct Site: Yes   Correct Laterality: N/A   Correct Position: Yes   Site Marked: No   .   Procedure Documentation    .    Procedure: epidural catheter, .   Patient Position:sitting Insertion Site:L3-4  (midline approach) Injection technique: LORT saline   Local skin infiltrated with 3 mL of 1% lidocaine.  ANGELES at 6 cm    Patient Prep/Sterile Barriers; mask, sterile gloves, povidone-iodine 7.5% surgical scrub, patient draped.  .  Needle: Touhy needle   Needle Gauge: 17.    Needle Length (Inches) 3.5   # of attempts: 1 and  # of redirects:  2 .    Catheter: 19 G . .  Catheter threaded easily  5 cm epidural space.  11 cm at skin.   .    Assessment/Narrative  Paresthesias: No.  .  .  Aspiration negative for heme or CSF  . Test dose of 5 mL lidocaine 1.5% w/ 1:200,000 epinephrine at 07:25.  Test dose negative for signs of intravascular, subdural or intrathecal injection. Comments:  I or my partner am immediately available. I or my partner will monitor the patient and supervise nursing care at necessary intervals.    Given 10 ml infusion  (0.125% bupivicaine/fentanyl 2 mcg/ml ) with 100 mcg fentanyl

## 2020-08-17 NOTE — PROVIDER NOTIFICATION
08/17/20 1108   Provider Notification   Provider Name/Title Dr. Lagos   Method of Notification Phone;Electronic Page   Request Evaluate - Remote   Notification Reason SVE;Status Update   POC to start pushing patient complete and +1

## 2020-08-17 NOTE — PLAN OF CARE
Pt vitals stable. Cervidil placed at 2120. Pt able to rest intermittently overnight but started feeling increased cramping and started utilizing IV Fentanyl for discomfort at 0250. Pt notes good pain relief.    Uterine tachysystole treated with repositioning and frequent voiding at 0415.    IV fluid bolus started at 0520 for tachysystole.    Cervidil removed at 0602.

## 2020-08-18 LAB — HGB BLD-MCNC: 9.5 G/DL (ref 11.7–15.7)

## 2020-08-18 PROCEDURE — 25000132 ZZH RX MED GY IP 250 OP 250 PS 637: Performed by: OBSTETRICS & GYNECOLOGY

## 2020-08-18 PROCEDURE — 36415 COLL VENOUS BLD VENIPUNCTURE: CPT | Performed by: OBSTETRICS & GYNECOLOGY

## 2020-08-18 PROCEDURE — 85018 HEMOGLOBIN: CPT | Performed by: OBSTETRICS & GYNECOLOGY

## 2020-08-18 PROCEDURE — 12000000 ZZH R&B MED SURG/OB

## 2020-08-18 RX ADMIN — DOCUSATE SODIUM AND SENNOSIDES 2 TABLET: 8.6; 5 TABLET ORAL at 23:00

## 2020-08-18 RX ADMIN — IBUPROFEN 800 MG: 800 TABLET, FILM COATED ORAL at 15:42

## 2020-08-18 RX ADMIN — IBUPROFEN 800 MG: 800 TABLET, FILM COATED ORAL at 03:37

## 2020-08-18 RX ADMIN — IBUPROFEN 800 MG: 800 TABLET, FILM COATED ORAL at 23:00

## 2020-08-18 RX ADMIN — DOCUSATE SODIUM AND SENNOSIDES 2 TABLET: 8.6; 5 TABLET ORAL at 08:19

## 2020-08-18 RX ADMIN — PRENATAL VITAMINS-IRON FUMARATE 27 MG IRON-FOLIC ACID 0.8 MG TABLET 1 TABLET: at 08:19

## 2020-08-18 RX ADMIN — IBUPROFEN 800 MG: 800 TABLET, FILM COATED ORAL at 09:51

## 2020-08-18 NOTE — L&D DELIVERY NOTE
Delivery Date:  2020      PREOPERATIVE DIAGNOSES:   1.  A 29-year-old G2, P0-0-1-0 at 39 weeks 1 day admitted for induction of labor   2.  Gestational hypertension.      POSTOPERATIVE DIAGNOSES:   1.  A 29-year-old G2, P0-0-1-0 at 39 weeks 1 day admitted for induction of labor.   2.  Spontaneous vaginal delivery.      DELIVERING PROVIDER:  Paulette Lagos MD      ANESTHESIA:  Epidural.      HISTORY OF PRESENT ILLNESS:  This is a 29-year-old G2, P0-0-1-0 who was admitted the evening of 2020 for medical induction of labor given gestational hypertension.  Cervidil ripening agent was placed at approximately 8:15 p.m.  At that time, the fetal heart tracing was reportedly category 1.  She began to develop more discomfort throughout the evening, and at 6:05 a.m. uterine tachysystole was noted, and the patient was requesting fentanyl.  The Cervidil was then removed at approximately 6:20.  The patient's cervix at that time was 2/80/-1 per the nurse.  When I presented to Labor and Delivery at approximately 8:10 a.m., the patient was uncomfortable, with a category 2 fetal heart tracing.  She was curtis every 1-2 minutes spontaneously.  Cervical exam was 3/80/-1 station.  She had just experienced spontaneous rupture of membranes.  No augmentation was required at this point.  Throughout the day, she made significant progress and was 6 cm dilated by 10:30 a.m.  She then achieved complete dilation at 11:03 a.m.  Second stage was initiated per patient's sensation of pressure at 11:30 a.m.  I was called to the bedside at approximately 11:40, and she pushed effectively to  over a period of 20 minutes.  A vigorous infant female was then delivered at 11:52 a.m.  A tight nuchal cord was reduced after delivery.  Delayed cord clamping was performed given the vigorous nature of the infant.  Cord blood was then collected.  The uterus was then massaged, yielding delivery of an intact placenta.  Inspection of the  perineum revealed a first-degree laceration.  This was repaired in the standard fashion with 3-0 Vicryl.  At this time, increased bleeding was noted, and moderate uterine atony was detected on exam.  A bimanual exam and manual extraction of clots were performed, as well as increase in the rate of Pitocin.  Better uterine tone was noted, but not adequate.  Her blood pressures had been normal throughout the labor process, so 0.2 mg of intramuscular Methergine was then given.  A better tone was noted quickly.  Following the delivery, the uterus was firm and below the umbilicus.      FINDINGS OF THE DELIVERY:  Liveborn infant female, Apgars of 8 and 9.  Weight of 3195 grams.      QUANTITATIVE BLOOD LOSS:  From the delivery was 458 mL.         KIARA SORIANO MD             D: 2020   T: 2020   MT: TIANA      Name:     BAZINET, ALLISON   MRN:      [id number]        Account:        [id number]   :      1990        Delivery Date:  2020               Document: [id number]

## 2020-08-18 NOTE — PLAN OF CARE
Pt s/p .  VSS and assessment WNL.  Pt voiding without difficulty.  Pain well controlled.  Breastfeeding infant with minimal assist.  Nipples tender, given mother love and hydrogel pads for comfort.  Positive bonding observed with infant.  Spouse at bedside, supportive of couplet.  Pt stable; continue with current plan of care.

## 2020-08-18 NOTE — LACTATION NOTE
This note was copied from a baby's chart.  LC visit. Parents had many questions that were appropriate.  LC assisted with infant latch and positioning on both sides.  HE was demonstrated and swallows pointed out.  Breast compressions were used to keep infant in a nutritive suck pattern.  LC also assisted with pump setup per her request.  No need to pump at this time, but she wanted assistance with putting it together and figuring out her settings on the Spectra S2. LC reviewed infant expectations for frequency of feeds, cluster feeding, how to know she is getting enough, and referenced her education materials.  Both parents were very engaged in infant cares.  No concerns noted.

## 2020-08-18 NOTE — PLAN OF CARE
Patient vital signs stable and meeting expected outcomes.  Breastfeeding fair with some assistance from staff.  Nipple shield introduced as infant was struggling to maintain latch and patient has smoother nipples.  Up independently and voiding adequately.  Pain well controlled with ibuprofen. Able to perform all cares for infant.  Bonding well with baby.   present and supportive at bedside.  Will continue to monitor.

## 2020-08-18 NOTE — ANESTHESIA POSTPROCEDURE EVALUATION
Patient: Allison J Bazinet    * No procedures listed *    Diagnosis:* No pre-op diagnosis entered *  Diagnosis Additional Information: No value filed.    Anesthesia Type:  Epidural    Note:  Anesthesia Post Evaluation         Comments:     S/P epidural for labor.   I or my partner was immediately available for management of this patient during epidural analgesia infusion.  VSS.  Doing well. Block resolved.  Neuro at baseline. Denies positional headache. Minimal side effects easily managed w/ PRN meds. No apparent anesthetic complications. No follow-up required.    Robi Zhang MD          Last vitals:  Vitals:    08/17/20 1629 08/18/20 0055 08/18/20 0830   BP: 125/68 117/67 119/63   Pulse:      Resp: 18 16 15   Temp: 98  F (36.7  C) 98.1  F (36.7  C) 97.9  F (36.6  C)   SpO2:            Electronically Signed By: Robi Zhang MD  August 18, 2020  12:35 PM

## 2020-08-18 NOTE — PLAN OF CARE
Data: Vital signs within normal limits. Postpartum checks within normal limits - see flow record. Patient eating and drinking normally. Patient able to empty bladder independently and is up ambulating. No apparent signs of infection.  First degree laceration  healing well. Patient performing self cares and is able to care for infant.  Action: Patient medicated during the shift for pain and cramping. See MAR. Patient reassessed within 1 hour after each medication and pain was improved - patient stated she was comfortable. Patient education done. See flow record.  Response: Positive attachment behaviors observed with infant. Support person is present.   Plan: Anticipate discharge on 8/19/2020.

## 2020-08-18 NOTE — PROGRESS NOTES
[facility]   Obstetrics Progress Note    Subjective:   This is the patient's first day since Vaginal delivery. She is doing well.  She is ambulating and urinating on her own. Pain is well controlled with medication. Breastfeeding is going well, would like to stay one more day to work with lactation. Lochia is within normal limits, denies clots.     Objective:   All vitals stable  Temp: 98.1  F (36.7  C) Temp src: Oral BP: 117/67   Heart Rate: 78 Resp: 16          Constitutional: healthy, alert, no distress.   Abdomen: Abdomen soft, non-tender. BS normal. No masses, fundus is firm.  JOINT/EXTREMITIES: extremities normal     Last hemoglobin was   Hemoglobin   Date Value Ref Range Status   08/18/2020 9.5 (L) 11.7 - 15.7 g/dL Final   ]    Assessment:   Stable postpartum course.    Plan:   Routine cares. Ambulation encouraged  Pain control: ibuprofen and acetaminophen PRN  Diet as tolerated  Activity as tolerated  Dispo: per patient, would like to discharge home tomorrow.       Jelena Orosco PA-C

## 2020-08-19 VITALS
TEMPERATURE: 97.9 F | DIASTOLIC BLOOD PRESSURE: 58 MMHG | HEART RATE: 85 BPM | RESPIRATION RATE: 16 BRPM | OXYGEN SATURATION: 100 % | SYSTOLIC BLOOD PRESSURE: 116 MMHG

## 2020-08-19 PROCEDURE — 25000132 ZZH RX MED GY IP 250 OP 250 PS 637: Performed by: OBSTETRICS & GYNECOLOGY

## 2020-08-19 RX ORDER — FERROUS SULFATE 325(65) MG
325 TABLET, DELAYED RELEASE (ENTERIC COATED) ORAL DAILY
Qty: 20 TABLET | Refills: 0 | Status: SHIPPED | OUTPATIENT
Start: 2020-08-19 | End: 2021-12-06

## 2020-08-19 RX ADMIN — IBUPROFEN 800 MG: 800 TABLET, FILM COATED ORAL at 05:43

## 2020-08-19 RX ADMIN — DOCUSATE SODIUM AND SENNOSIDES 2 TABLET: 8.6; 5 TABLET ORAL at 09:05

## 2020-08-19 NOTE — PLAN OF CARE
Data: Vital signs within normal limits. Postpartum checks within normal limits - see flow record. Patient eating and drinking normally. Patient able to empty bladder independently and is up ambulating. No apparent signs of infection. Perineal laceration healing well, using tucks for comfort .Patient performing self cares and is able to care for infant.  Action: Patient not medicated during the shift for pain. - patient stated she was comfortable. Patient education done about  See flow record.  Response: Positive attachment behaviors observed with infant. Support persons spouse present.   Plan: discharge  today 8/19, seen by DR Agosto , fit for discharge to home, virtual vidiy in 2 weeks and 8 weeks. Verbalizes understanding of all follow up instructions for herself and baby, all questions answered. Feels ready to go home today.Left unit at 11.20 am

## 2020-08-19 NOTE — PROGRESS NOTES
Doing well. Has h/o migraines. Had one the day before delivery. Takes meds ok for breast feeding per PDR.    vss afebrile  Fundus firm and nt  Extremities nl    Hgb= 9.5    A: PPD 2 doing well      Gest HTN now with nl BPs       P: Discharge home      Precautions reviewed      PIH prec reviewed      Fe       RTC 2 weeks for virtual visit and in 8 weeks for exam in clinic

## 2020-08-19 NOTE — PLAN OF CARE
VSS.  Pain well controlled.  Bleeding and fundal checks WNL.  Voiding without difficulty, frequent voiding encouraged.  Independent with breastfeeding, good latch observed.   is cluster feeding.  Nipples are tender; mothers love and hydropads used for comfort. Plan to discharge today.

## 2020-08-19 NOTE — DOWNTIME EVENT NOTE
The EMR was down for 4 hours on 8/19/2020.    JENNY Tolbert was responsible for completing the paper charting during this time period.     The following information was re-entered into the system by Didi Velasquez RN: Flowsheet data    The following information will remain in the paper chart: none    Didi Velasquez RN  8/19/2020

## 2020-08-19 NOTE — DISCHARGE INSTRUCTIONS
Preeclampsia   Call your doctor right away if you have any of the following:  - Edema (swelling) in your face or hands  - Rapid weight gain-about 1 pound or more in a day  - Headache  - Abdominal pain on your right side  - Vision problems (flashes or spots)  - You have questions or concerns once you return home.    Postpartum Vaginal Delivery Instructions  Follow up virtual visit in 2 weeks then postpartum check in clinic at 6- 8 weeks   Lactation    [place] [phone]    Activity       Ask family and friends for help when you need it.    Do not place anything in your vagina for 6 weeks.    You are not restricted on other activities, but take it easy for a few weeks to allow your body to recover from delivery.  You are able to do any activities you feel up to that point.    No driving until you have stopped taking your pain medications (usually two weeks after delivery).     Call your health care provider if you have any of these symptoms:       Increased pain, swelling, redness, or fluid around your stiches from an episiotomy or perineal tear.    A fever above 100.4 F (38 C) with or without chills when placing a thermometer under your tongue.    You soak a sanitary pad with blood within 1 hour, or you see blood clots larger than a golf ball.    Bleeding that lasts more than 6 weeks.    Vaginal discharge that smells bad.    Severe pain, cramping or tenderness in your lower belly area.    A need to urinate more frequently (use the toilet more often), more urgently (use the toilet very quickly), or it burns when you urinate.    Nausea and vomiting.    Redness, swelling or pain around a vein in your leg.    Problems breastfeeding or a red or painful area on your breast.    Chest pain and cough or are gasping for air.    Problems coping with sadness, anxiety, or depression.  If you have any concerns about hurting yourself or the baby, call your provider immediately.     You have questions or  concerns after you return home.     Keep your hands clean:  Always wash your hands before touching your perineal area and stitches.  This helps reduce your risk of infection.  If your hands aren't dirty, you may use an alcohol hand-rub to clean your hands. Keep your nails clean and short.

## 2020-08-22 ENCOUNTER — NURSE TRIAGE (OUTPATIENT)
Dept: NURSING | Facility: CLINIC | Age: 30
End: 2020-08-22

## 2020-08-22 NOTE — TELEPHONE ENCOUNTER
Mom calling with question regarding breast feeding, latching on and engorgement. Baby is nursing well, but mom is concerned because she is producing a lot of milk and breast feel really full.  Gave home care advice  Gave numbers for lactation consultants.  Call back if needed.  Le Glover RN [facility]    COVID 19 Nurse Triage Plan/Patient Instructions    Please be aware that novel coronavirus (COVID-19) may be circulating in the community. If you develop symptoms such as fever, cough, or SOB or if you have concerns about the presence of another infection including coronavirus (COVID-19), please contact your health care provider or visit [email or web address].     Disposition/Instructions    Additional COVID19 information to add for patients.   How can I protect others?  If you have symptoms (fever, cough, body aches or trouble breathing): Stay home and away from others (self-isolate) until:    At least 10 days have passed since your symptoms started. And     You ve had no fever--and no medicine that reduces fever--for 1 full day (24 hours). And      Your other symptoms have resolved (gotten better).     If you don t have symptoms, but a test showed that you have COVID-19 (you tested positive):    Stay home and away from others (self-isolate) until at least 10 days have passed since the date of your first positive COVID-19 test.    During this time:    Stay in your own room, even for meals. Use your own bathroom if you can.     Stay away from others in your home. No hugging, kissing or shaking hands. No visitors.    Don t go to work, school or anywhere else.     Clean  high touch  surfaces often (doorknobs, counters, handles, etc.). Use a household cleaning spray or wipes. You ll find a full list on the EPA website:  www.epa.gov/pesticide-registration/list-n-disinfectants-use-against-sars-cov-2.    Cover your mouth and nose with a mask, tissue or washcloth to avoid spreading germs.    Wash your hands  and face often. Use soap and water.    Caregivers in these groups are at risk for severe illness due to COVID-19:  o People 65 years and older  o People who live in a nursing home or long-term care facility  o People with chronic disease (lung, heart, cancer, diabetes, kidney, liver, immunologic)  o People who have a weakened immune system, including those who:  - Are in cancer treatment  - Take medicine that weakens the immune system, such as corticosteroids  - Had a bone marrow or organ transplant  - Have an immune deficiency  - Have poorly controlled HIV or AIDS  - Are obese (body mass index of 40 or higher)  - Smoke regularly    Caregivers should wear gloves while washing dishes, handling laundry and cleaning bedrooms and bathrooms.    Use caution when washing and drying laundry: Don t shake dirty laundry, and use the warmest water setting that you can.    For more tips, go to www.cdc.gov/coronavirus/2019-ncov/downloads/10Things.pdf.    How can I take care of myself?  1. Get lots of rest. Drink extra fluids (unless a doctor has told you not to).     2. Take Tylenol (acetaminophen) for fever or pain. If you have liver or kidney problems, ask your family doctor if it s okay to take Tylenol.     Adults can take either:     650 mg (two 325 mg pills) every 4 to 6 hours, or     1,000 mg (two 500 mg pills) every 8 hours as needed.     Note: Don t take more than 3,000 mg in one day.   Acetaminophen is found in many medicines (both prescribed and over-the-counter medicines). Read all labels to be sure you don t take too much.     For children, check the Tylenol bottle for the right dose. The dose is based on the child s age or weight.    3. If you have other health problems (like cancer, heart failure, an organ transplant or severe kidney disease): Call your specialty clinic if you don t feel better in the next 2 days.    4. Know when to call 911: Emergency warning signs include:    Trouble breathing or shortness of  breath    Pain or pressure in the chest that doesn t go away    Feeling confused like you haven t felt before, or not being able to wake up    Bluish-colored lips or face    What are the symptoms of COVID-19?     The most common symptoms are cough, fever and trouble breathing.     Less common symptoms include body aches, chills, diarrhea (loose, watery poops), fatigue (feeling very tired), headache, runny nose, sore throat and loss of smell.    COVID-19 can cause severe coughing (bronchitis) and lung infection (pneumonia).    How does it spread?     The virus may spread when a person coughs or sneezes into the air. The virus can travel about 6 feet this way, and it can live on surfaces.      Common  (household disinfectants) will kill the virus.    Who is at risk?  Anyone can catch COVID-19 if they re around someone who has the virus.    How can others protect themselves?     Stay away from people who have COVID-19 (or symptoms of COVID-19).    Wash hands often with soap and water. Or, use hand  with at least 60% alcohol.    Avoid touching the eyes, nose or mouth.     Wear a face mask when you go out in public, when sick or when caring for a sick person.    Where can I get more information?    [facility]: About COVID-19: [email or web address]    CDC: What to Do If You re Sick: www.cdc.gov/coronavirus/2019-ncov/about/steps-when-sick.html    CDC: Ending Home Isolation: www.cdc.gov/coronavirus/2019-ncov/hcp/disposition-in-home-patients.html     CDC: Caring for Someone: www.cdc.gov/coronavirus/2019-ncov/if-you-are-sick/care-for-someone.html     [facility]: Interim Guidance for Hospital Discharge to Home: www.health.[facility].[place]/diseases/coronavirus/hcp/hospdischarge.pdf    [facility] clinical trials (COVID-19 research studies): clinicalaffairs.[facility]-clinical-trials     Below are the COVID-19 hotlines at the [place] Department of Health ([facility]). Interpreters are available.    o For health questions: Call 969-906-8627 or 1-172.107.2596 (7 a.m. to 7 p.m.)  o For questions about schools and childcare: Call 768-467-5405 or 1-820.295.5520 (7 a.m. to 7 p.m.)          Thank you for taking steps to prevent the spread of this virus.  o Limit your contact with others.  o Wear a simple mask to cover your cough.  o Wash your hands well and often.    Resources    [facility]: About COVID-19: [email or web address]    CDC: What to Do If You're Sick: www.cdc.gov/coronavirus/2019-ncov/about/steps-when-sick.html    CDC: Ending Home Isolation: www.cdc.gov/coronavirus/2019-ncov/hcp/disposition-in-home-patients.html     CDC: Caring for Someone: www.cdc.gov/coronavirus/2019-ncov/if-you-are-sick/care-for-someone.html     [facility]: Interim Guidance for Hospital Discharge to Home: www.[facility].[place].us/diseases/coronavirus/hcp/hospdischarge.pdf    [facility] clinical trials (COVID-19 research studies): clinicalaffairs.[facility]-clinical-trials     Below are the COVID-19 hotlines at the [place] Department of Health ([facility]). Interpreters are available.   o For health questions: Call 715-423-4661 or 1-766.306.6188 (7 a.m. to 7 p.m.)  o For questions about schools and childcare: Call 528-055-7801 or 1-291.546.6076 (7 a.m. to 7 p.m.)                     Additional Information    Breastfeeding questions about baby    Negative: [1] Baby day 2 of life AND [2] no urine > 12 hours    Negative: [1] Baby day 3 or 4 of life AND [2] no urine > 8 hours    Engorgement (generalized swelling and pain) of both breasts, questions about    Protocols used: POSTPARTUM - BREASTFEEDING GUIDELINE [id number], POSTPARTUM - BREASTFEEDING [id number]

## 2020-11-12 ENCOUNTER — MYC MEDICAL ADVICE (OUTPATIENT)
Dept: FAMILY MEDICINE | Facility: CLINIC | Age: 30
End: 2020-11-12

## 2020-11-12 NOTE — TELEPHONE ENCOUNTER
[employer] message sent.    Crow ROMERO RN   [facility]

## 2020-11-18 ENCOUNTER — VIRTUAL VISIT (OUTPATIENT)
Dept: FAMILY MEDICINE | Facility: CLINIC | Age: 30
End: 2020-11-18
Payer: COMMERCIAL

## 2020-11-18 DIAGNOSIS — G43.109 MIGRAINE WITH AURA AND WITHOUT STATUS MIGRAINOSUS, NOT INTRACTABLE: Primary | ICD-10-CM

## 2020-11-18 DIAGNOSIS — Z97.5 BREAKTHROUGH BLEEDING ASSOCIATED WITH INTRAUTERINE DEVICE (IUD): ICD-10-CM

## 2020-11-18 DIAGNOSIS — N92.1 BREAKTHROUGH BLEEDING ASSOCIATED WITH INTRAUTERINE DEVICE (IUD): ICD-10-CM

## 2020-11-18 PROCEDURE — 99213 OFFICE O/P EST LOW 20 MIN: CPT | Mod: 95 | Performed by: FAMILY MEDICINE

## 2020-11-18 RX ORDER — SUMATRIPTAN 50 MG/1
TABLET, FILM COATED ORAL
Qty: 10 TABLET | Refills: 5 | Status: SHIPPED | OUTPATIENT
Start: 2020-11-18 | End: 2021-12-23

## 2020-11-18 NOTE — PROGRESS NOTES
"Allison J Bazinet is a 29 year old female who is being evaluated via a billable video visit.      The patient has been notified of following:     \"This video visit will be conducted via a call between you and your physician/provider. We have found that certain health care needs can be provided without the need for an in-person physical exam.  This service lets us provide the care you need with a video conversation.  If a prescription is necessary we can send it directly to your pharmacy.  If lab work is needed we can place an order for that and you can then stop by our lab to have the test done at a later time.    Video visits are billed at different rates depending on your insurance coverage.  Please reach out to your insurance provider with any questions.    If during the course of the call the physician/provider feels a video visit is not appropriate, you will not be charged for this service.\"    Patient has given verbal consent for Video visit? Yes  How would you like to obtain your AVS? MyChart  If you are dropped from the video visit, the video invite should be resent to: Send to e-mail at: [email or web address]  Will anyone else be joining your video visit? No    Subjective     Allison J Bazinet is a 29 year old female who presents today via video visit for the following health issues:    HPI     Had a baby girl , Elvira - 3 months old , sleeping through the night.        Medication Followup of SUMATRIPTAN    Taking Medication as prescribed: yes    Side Effects:  None    Medication Helping Symptoms:  yes          Video Start Time: 0956 AM    Migraine     Since your last clinic visit, how have your headaches changed?  Worsened for a short period around time of miscarriage just before pregnancy     Had an IUD placed about 5 weeks. Was on ocp's for several days to stop the uterine bleeding after that  - had migraine with that.     Usually just gets 1-2 migraines a year.     Is out of sumatriptan.  Didn't take it " while pregnant.      How often are you getting headaches or migraines?  See above.      Are you able to do normal daily activities when you have a migraine? Yes    Are you taking rescue/relief medications? (Select all that apply) ibuprofen (Advil, Motrin), Tylenol and sumatriptan (Imitrex)    How helpful is your rescue/relief medication?  I get total relief with lying down in dark room and and goes to bed.     Are you taking any medications to prevent migraines? (Select all that apply)  No    In the past 4 weeks, how often have you gone to urgent care or the emergency room because of your headaches?  0      How many servings of fruits and vegetables do you eat daily?  2-3    On average, how many sweetened beverages do you drink each day (Examples: soda, juice, sweet tea, etc.  Do NOT count diet or artificially sweetened beverages)?   1    How many days per week do you exercise enough to make your heart beat faster? 3 or less    How many minutes a day do you exercise enough to make your heart beat faster? 30 - 60    How many days per week do you miss taking your medication? 0      Review of Systems   Constitutional, HEENT, cardiovascular, pulmonary, gi and gu systems are negative, except as otherwise noted.      Objective           Vitals:  No vitals were obtained today due to virtual visit.    Physical Exam     GENERAL: Healthy, alert and no distress  EYES: Eyes grossly normal to inspection.  No discharge or erythema, or obvious scleral/conjunctival abnormalities.  RESP: No audible wheeze, cough, or visible cyanosis.  No visible retractions or increased work of breathing.    SKIN: Visible skin clear. No significant rash, abnormal pigmentation or lesions.  NEURO: Cranial nerves grossly intact.  Mentation and speech appropriate for age.  PSYCH: Mentation appears normal, affect normal/bright, judgement and insight intact, normal speech and appearance well-groomed.      No results found for this or any previous visit  "(from the past 24 hour(s)).        Assessment & Plan       ICD-10-CM    1. Migraine with aura and without status migrainosus, not intractable  G43.109 SUMAtriptan (IMITREX) 50 MG tablet   2. Breakthrough bleeding associated with intrauterine device (IUD)  N92.1     Z97.5       Cautioned patient never to take triptan like medications such as Imitrex during pregnancy.  The contraindication comes from the vasoconstrictive properties of triptan medications which could compromise blood supply to baby.  Patient did acknowledge taking 1 dose of sumatriptan hand the night before her induction at 39 weeks as she had a severe migraine at that time baby suffered no ill effects.    BMI:   Estimated body mass index is 26.31 kg/m  as calculated from the following:    Height as of 8/13/20: 1.676 m (5' 6\").    Weight as of 8/13/20: 73.9 kg (163 lb).   Weight management plan: Discussed healthy diet and exercise guidelines  Pt is 3 months postpartum.        See Patient Instructions    Return in about 1 year (around 11/18/2021) for migraine , with Dr. Acuña.    Adriana Acuña MD  [facility]      Video-Visit Details:     Type of service:  Video Visit    Video End Time:10:12 AM    Originating Location (pt. Location): Home    Distant Location (provider location):  [facility]     Platform used for Video Visit: atilio Gerber - pt could not see or hear me and I could not see her, so converted successfully to D[employer].  The times above are for that calls.          Adriana Acuña MD    "

## 2020-11-18 NOTE — PATIENT INSTRUCTIONS
[facility]  [address] [place] [address]  Office: [phone]   Fax:    [phone]      Return in about 1 year (around 11/18/2021) for migraine , with Dr. Acuña.            Thank you so much for doing a video visit with us today. And, again, thank you  for choosing our [facility].  Please contact us with any questions that you may have.   We appreciate the opportunity to serve you now and look forward to supporting your healthcare needs for a long time to come!    Our clinic for the foreseeable future and until further notice , will continue to offer virtual visits, including telephone visits, video visits,  and e-visits, especially during this period of COVID-19 coronavirus pandemic.  Please call to schedule another one if you need it!        Most Sincerely,     Adriana Acuña MD                                              To reach your [facility] care team after hours call:   [phone]    PLEASE NOTE OUR HOURS HAVE CHANGED secondary to COVID-19 coronavirus pandemic, as we are trying to minimize patient exposure to the virus,  which is now widespread in the [facility].  These hours may change with very little notice.  We apologize for any inconvenience.       Our current clinic hours are:         Monday- Thursday   7:00am - 6:00pm  in person.      Friday  7:00am- 5:00pm in person                       Saturday and Sunday : Closed to in person and virtual visits            We have telephone and virtual visit times available between 7:00am - 6pm on             Monday-Friday as well.                                                Phone:  [phone]    Our pharmacy hours:   Monday  9:00 am to 6:00 pm      Tuesday through Friday 9:00am to 5:00pm                        Saturday - 9:00 am to 12 noon       Sunday : Closed.     ###  Please note: at this time we are not accepting any walk-in visits. ###      There  is also information available at our web site:  [email or web address]    If your provider ordered any lab tests and you do not receive the results within 10 business days, please call the clinic.    If you need a medication refill please contact your pharmacy.  Please allow 2 business days for your refill to be completed.    Our clinic offers telephone visits and e visits.  Please ask one of your team members to explain more.      Use [employer] (secure email communication and access to your chart) to send your primary care provider a message or make an appointment. Ask someone on your Team how to sign up for [email or web address].     Pharmacy is drive-thru only at this time secondary to the COVID-19 coronavirus pandemic, as we are trying to minimize patient exposure to the virus,  which is now widespread in the state.        There is also information available at our web site:  [email or web address]    If your provider ordered any lab tests and you do not receive the results within 10 business days, please call the clinic.    If you need a medication refill please contact your pharmacy.  Please allow 2 business days for your refill to be completed.

## 2021-02-27 ENCOUNTER — OFFICE VISIT (OUTPATIENT)
Dept: URGENT CARE | Facility: URGENT CARE | Age: 31
End: 2021-02-27
Payer: COMMERCIAL

## 2021-02-27 ENCOUNTER — NURSE TRIAGE (OUTPATIENT)
Dept: NURSING | Facility: CLINIC | Age: 31
End: 2021-02-27

## 2021-02-27 VITALS
OXYGEN SATURATION: 100 % | HEART RATE: 110 BPM | DIASTOLIC BLOOD PRESSURE: 71 MMHG | TEMPERATURE: 98.6 F | SYSTOLIC BLOOD PRESSURE: 105 MMHG | RESPIRATION RATE: 16 BRPM

## 2021-02-27 DIAGNOSIS — R07.0 THROAT PAIN: ICD-10-CM

## 2021-02-27 DIAGNOSIS — J02.9 PHARYNGITIS, UNSPECIFIED ETIOLOGY: Primary | ICD-10-CM

## 2021-02-27 LAB
DEPRECATED S PYO AG THROAT QL EIA: NEGATIVE
SPECIMEN SOURCE: NORMAL

## 2021-02-27 PROCEDURE — 87651 STREP A DNA AMP PROBE: CPT | Performed by: FAMILY MEDICINE

## 2021-02-27 PROCEDURE — 99213 OFFICE O/P EST LOW 20 MIN: CPT | Performed by: PHYSICIAN ASSISTANT

## 2021-02-27 PROCEDURE — 99N1174 PR STATISTIC STREP A RAPID: Performed by: FAMILY MEDICINE

## 2021-02-27 RX ORDER — IBUPROFEN 800 MG/1
800 TABLET, FILM COATED ORAL EVERY 8 HOURS PRN
Qty: 30 TABLET | Refills: 0 | Status: SHIPPED | OUTPATIENT
Start: 2021-02-27 | End: 2021-12-06

## 2021-02-27 RX ORDER — AMOXICILLIN 875 MG
875 TABLET ORAL 2 TIMES DAILY
Qty: 14 TABLET | Refills: 0 | Status: SHIPPED | OUTPATIENT
Start: 2021-02-27 | End: 2021-03-06

## 2021-02-27 ASSESSMENT — ENCOUNTER SYMPTOMS
SPEECH DIFFICULTY: 0
FACIAL SWELLING: 0
RESPIRATORY NEGATIVE: 1
EYES NEGATIVE: 1
FACIAL ASYMMETRY: 0
NUMBNESS: 0
VOICE CHANGE: 0
TROUBLE SWALLOWING: 0
SEIZURES: 0
GASTROINTESTINAL NEGATIVE: 1
PSYCHIATRIC NEGATIVE: 1
UNEXPECTED WEIGHT CHANGE: 0
DIZZINESS: 0
RHINORRHEA: 1
CARDIOVASCULAR NEGATIVE: 1
DIAPHORESIS: 0
HEADACHES: 1
FATIGUE: 1
SORE THROAT: 1
FEVER: 1
TREMORS: 0
ACTIVITY CHANGE: 1
LIGHT-HEADEDNESS: 0
SINUS PAIN: 0
WEAKNESS: 0
APPETITE CHANGE: 1
MYALGIAS: 1
SINUS PRESSURE: 1

## 2021-02-27 NOTE — PROGRESS NOTES
Throat pain  - Streptococcus A Rapid Scr w Reflx to PCR  - Group A Streptococcus PCR Throat Swab  - phenol (CHLORASEPTIC) 1.4 % spray; Take 1 spray (1 mL) by mouth every hour as needed for sore throat  - ibuprofen (ADVIL/MOTRIN) 800 MG tablet; Take 1 tablet (800 mg) by mouth every 8 hours as needed for moderate pain    Pharyngitis, unspecified etiology  - Streptococcus A Rapid Scr w Reflx to PCR  - Group A Streptococcus PCR Throat Swab  - phenol (CHLORASEPTIC) 1.4 % spray; Take 1 spray (1 mL) by mouth every hour as needed for sore throat  - ibuprofen (ADVIL/MOTRIN) 800 MG tablet; Take 1 tablet (800 mg) by mouth every 8 hours as needed for moderate pain    Patient was given amoxicillin to hold at the pharmacy and only to be picked up if her strep PCR returns positive.     Age 12 months or more  Okay to use Zarbee's   Okay to use Rx Children Tylenol if prescribed (Dose based on weight)    Age 2-12:   Okay to use Children Motrin or Tylenol over the counter.    Adults:  Okay to take acetaminophen 500 mg- 2 tabs (Total of 1000 mg) every 8 hrs   Okay to take ibuprofen 200 mg- 3 tabs (Total of 600 mg) every 6 hours        Okay to use Neti pot for sinus lavage up to three times daily for congestion and sinus pressure if present. Daily hot shower can be beneficial for congestion and body aches. Okay to use bedroom vaporizer or humidifier if symptoms are worse at night. Nightly Vicks Vapor rub and 5-10 mg of Melatonin okay to use for sleep.     Over the counter cough medication and decongestants okay if not prescribed by me during this visit. For homeopathic alternatives to cough syrup and decongestant, feel free to try Elderberry extract.    Okay to use salt water gargles, warm tea (or warm water with lemon and honey), and lozenges for any throat discomfort. Chloraseptic spray is also highly encourages for throat pain/irritation.     Patient will need to get plenty of rest and drink at least 1.5-2 liters of fluids daily  for adults and 1-1.5 liters for children. If vomiting and not tolerating liquids for more than 24 hrs, please go to your nearest emergency department for IV fluids and further treatment.     Patient is not contagious after 1 week from start of symptoms. If possible, wear mask for first 7 days. Wash hands regularly and vigorously for 30 seconds often.     20 minutes spent on the date of the encounter doing chart review, history and exam, documentation and further activities as noted above     See Patient Instructions  Patient Instructions     Patient Education     Self-Care for Sore Throats     Sore throats happen for many reasons, such as colds, allergies, cigarette smoke, air pollution, and infections caused by viruses or bacteria. In any case, your throat becomes red and sore. Your goal for self-care is to reduce your discomfort while giving your throat a chance to heal.  Moisten and soothe your throat  Tips include the following:    Try a sip of water first thing after waking up.    Keep your throat moist by drinking 6 or more glasses of clear liquids every day.    Run a cool-air humidifier in your room overnight.    Stay away from cigarette smoke.     Check the air quality index,if air pollution gives you a sore throat. On high pollution days, try to limit outdoor time.    Suck on throat lozenges, cough drops, hard candy, ice chips, or frozen fruit-juice bars. Use the sugar-free versions if your diet or medical condition requires them.  Gargle to ease irritation  Gargling every hour or 2 can ease irritation. Try gargling with 1 of these solutions:    1/4 teaspoon of salt in 1/2 cup of warm water    An over-the-counter anesthetic gargle  Use medicine for more relief  Over-the-counter medicine can reduce sore throat symptoms. Ask your pharmacist if you have questions about which medicine to use. To prevent possible medicine interactions, let the pharmacist know what medicines you take. To decrease  symptoms:    Ease pain with anesthetic sprays. Aspirin or an aspirin substitute also helps. Remember, never give aspirin to anyone 18 or younger. Don't take aspirin if you are already taking blood thinners.     For sore throats caused by allergies, try antihistamines to block the allergic reaction.  Unless a sore throat is caused by a bacterial infection, antibiotics won t help you.  Prevent future sore throats  Prevention tips include:    Stop smoking or reduce contact with secondhand smoke. Smoke irritates the tender throat lining.    Limit contact with pets and with allergy-causing substances, such as pollen and mold.    Wash your hands often when you re around someone with a sore throat or cold. This will keep viruses or bacteria from spreading.    Limit outdoor time when air pollution is bad.    Don t strain your vocal cords.  When to call your healthcare provider  Contact your healthcare provider if you have:    Fever of 100.4 F (38.0 C) or higher, or as directed by your healthcare provider    White spots on the throat    Great Trouble swallowing    A skin rash    Recent exposure to someone else with strep bacteria    Severe hoarseness and swollen glands in the neck or jaw  Call 911  Call 911 if any of the following occur:    Trouble breathing or catching your breath    Drooling and problems swallowing    Wheezing    Unable to talk    Feeling dizzy or faint    Feeling of doom  [employer] last reviewed this educational content on 9/1/2019 2000-2020 [employer]. [address] [place] [id number]. All rights reserved. This information is not intended as a substitute for professional medical care. Always follow your healthcare professional's instructions.               Sathya Pavon PA-C  [facility]    Subjective   30 year old year old who presents to clinic today for the following health issues:    Pharyngitis     Covid test on Thurs was negative.     HPI     Acute  Illness  Acute illness concerns: Sore  Onset/Duration: 3 day  Symptoms:  Fever: YES  Chills/Sweats: no  Headache (location?): YES  Sinus Pressure: YES  Conjunctivitis:  no  Ear Pain: YES: bilateral  Rhinorrhea: YES  Congestion: YES  Sore Throat: YES  Cough: no  Wheeze: no  Decreased Appetite: no  Nausea: no  Vomiting: no  Diarrhea: no  Dysuria/Freq.: no  Dysuria or Hematuria: no  Fatigue/Achiness: YES  Sick/Strep Exposure: No. Daughter in   Therapies tried and outcome: Ibuprofen    Review of Systems   Review of Systems   Constitutional: Positive for activity change, appetite change, fatigue and fever. Negative for diaphoresis and unexpected weight change.   HENT: Positive for congestion, rhinorrhea, sinus pressure and sore throat. Negative for dental problem, drooling, ear discharge, ear pain, facial swelling, hearing loss, mouth sores, nosebleeds, postnasal drip, sinus pain, sneezing, tinnitus, trouble swallowing and voice change.    Eyes: Negative.    Respiratory: Negative.    Cardiovascular: Negative.    Gastrointestinal: Negative.    Genitourinary: Negative.    Musculoskeletal: Positive for myalgias.   Skin: Negative.    Neurological: Positive for headaches. Negative for dizziness, tremors, seizures, syncope, facial asymmetry, speech difficulty, weakness, light-headedness and numbness.   Psychiatric/Behavioral: Negative.         Objective    Temp: 98.6  F (37  C) Temp src: Oral BP: 105/71 Pulse: 110   Resp: 16 SpO2: 100 %       Physical Exam   Physical Exam  Constitutional:       General: She is not in acute distress.     Appearance: Normal appearance. She is normal weight. She is not ill-appearing, toxic-appearing or diaphoretic.   HENT:      Head: Normocephalic and atraumatic.      Right Ear: Tympanic membrane, ear canal and external ear normal. There is no impacted cerumen.      Left Ear: Tympanic membrane, ear canal and external ear normal. There is no impacted cerumen.      Nose: Congestion present.       Mouth/Throat:      Mouth: Mucous membranes are moist.      Pharynx: Oropharyngeal exudate and posterior oropharyngeal erythema present.   Neck:      Musculoskeletal: Normal range of motion and neck supple. Muscular tenderness present.   Cardiovascular:      Rate and Rhythm: Normal rate and regular rhythm.      Pulses: Normal pulses.      Heart sounds: No murmur. No friction rub. No gallop.    Pulmonary:      Effort: Pulmonary effort is normal. No respiratory distress.      Breath sounds: Normal breath sounds. No stridor. No wheezing, rhonchi or rales.   Chest:      Chest wall: No tenderness.   Lymphadenopathy:      Cervical: Cervical adenopathy present.   Neurological:      General: No focal deficit present.      Mental Status: She is alert and oriented to person, place, and time. Mental status is at baseline.      Gait: Gait normal.   Psychiatric:         Mood and Affect: Mood normal.         Behavior: Behavior normal.         Thought Content: Thought content normal.         Judgment: Judgment normal.          Results for orders placed or performed in visit on 02/27/21 (from the past 24 hour(s))   Streptococcus A Rapid Scr w Reflx to PCR    Specimen: Throat   Result Value Ref Range    Strep Specimen Description Throat     Streptococcus Group A Rapid Screen Negative NEG^Negative

## 2021-02-27 NOTE — PATIENT INSTRUCTIONS
Patient Education     Self-Care for Sore Throats     Sore throats happen for many reasons, such as colds, allergies, cigarette smoke, air pollution, and infections caused by viruses or bacteria. In any case, your throat becomes red and sore. Your goal for self-care is to reduce your discomfort while giving your throat a chance to heal.  Moisten and soothe your throat  Tips include the following:    Try a sip of water first thing after waking up.    Keep your throat moist by drinking 6 or more glasses of clear liquids every day.    Run a cool-air humidifier in your room overnight.    Stay away from cigarette smoke.     Check the air quality index,if air pollution gives you a sore throat. On high pollution days, try to limit outdoor time.    Suck on throat lozenges, cough drops, hard candy, ice chips, or frozen fruit-juice bars. Use the sugar-free versions if your diet or medical condition requires them.  Gargle to ease irritation  Gargling every hour or 2 can ease irritation. Try gargling with 1 of these solutions:    1/4 teaspoon of salt in 1/2 cup of warm water    An over-the-counter anesthetic gargle  Use medicine for more relief  Over-the-counter medicine can reduce sore throat symptoms. Ask your pharmacist if you have questions about which medicine to use. To prevent possible medicine interactions, let the pharmacist know what medicines you take. To decrease symptoms:    Ease pain with anesthetic sprays. Aspirin or an aspirin substitute also helps. Remember, never give aspirin to anyone 18 or younger. Don't take aspirin if you are already taking blood thinners.     For sore throats caused by allergies, try antihistamines to block the allergic reaction.  Unless a sore throat is caused by a bacterial infection, antibiotics won t help you.  Prevent future sore throats  Prevention tips include:    Stop smoking or reduce contact with secondhand smoke. Smoke irritates the tender throat lining.    Limit contact with  pets and with allergy-causing substances, such as pollen and mold.    Wash your hands often when you re around someone with a sore throat or cold. This will keep viruses or bacteria from spreading.    Limit outdoor time when air pollution is bad.    Don t strain your vocal cords.  When to call your healthcare provider  Contact your healthcare provider if you have:    Fever of 100.4 F (38.0 C) or higher, or as directed by your healthcare provider    White spots on the throat    Great Trouble swallowing    A skin rash    Recent exposure to someone else with strep bacteria    Severe hoarseness and swollen glands in the neck or jaw  Call 911  Call 911 if any of the following occur:    Trouble breathing or catching your breath    Drooling and problems swallowing    Wheezing    Unable to talk    Feeling dizzy or faint    Feeling of doom  [employer] last reviewed this educational content on 9/1/2019 2000-2020 [employer]. [address] [place] [id number]. All rights reserved. This information is not intended as a substitute for professional medical care. Always follow your healthcare professional's instructions.

## 2021-02-28 LAB
SPECIMEN SOURCE: NORMAL
STREP GROUP A PCR: NOT DETECTED

## 2021-03-01 ENCOUNTER — E-VISIT (OUTPATIENT)
Dept: FAMILY MEDICINE | Facility: CLINIC | Age: 31
End: 2021-03-01
Payer: COMMERCIAL

## 2021-03-01 DIAGNOSIS — R52 BODY ACHES: ICD-10-CM

## 2021-03-01 DIAGNOSIS — R50.9 FEVER, UNSPECIFIED FEVER CAUSE: ICD-10-CM

## 2021-03-01 DIAGNOSIS — J02.9 SORE THROAT: Primary | ICD-10-CM

## 2021-03-01 DIAGNOSIS — R05.9 COUGH: ICD-10-CM

## 2021-03-01 PROCEDURE — 99422 OL DIG E/M SVC 11-20 MIN: CPT | Performed by: FAMILY MEDICINE

## 2021-03-03 ENCOUNTER — OFFICE VISIT (OUTPATIENT)
Dept: URGENT CARE | Facility: URGENT CARE | Age: 31
End: 2021-03-03
Attending: FAMILY MEDICINE
Payer: COMMERCIAL

## 2021-03-03 DIAGNOSIS — R50.9 FEVER, UNSPECIFIED FEVER CAUSE: ICD-10-CM

## 2021-03-03 DIAGNOSIS — J02.9 SORE THROAT: ICD-10-CM

## 2021-03-03 DIAGNOSIS — R52 BODY ACHES: ICD-10-CM

## 2021-03-03 DIAGNOSIS — R05.9 COUGH: ICD-10-CM

## 2021-03-03 LAB
SARS-COV-2 RNA RESP QL NAA+PROBE: NORMAL
SPECIMEN SOURCE: NORMAL

## 2021-03-03 PROCEDURE — 87635 SARS-COV-2 COVID-19 AMP PRB: CPT | Performed by: FAMILY MEDICINE

## 2021-03-03 NOTE — PATIENT INSTRUCTIONS
[facility]  [address] [place] [address]  Office: [phone]   Fax:    [phone]       Please, call or return to clinic or go to the ER immediately if signs or symptoms worsen or fail to improve as anticipated.          Thank you so much for doing a e-visit with us today. And, again, thank you  for choosing our [facility].  Please contact us with any questions that you may have.   We appreciate the opportunity to serve you now and look forward to supporting your healthcare needs for a long time to come!    Our clinic for the foreseeable future and until further notice , will continue to offer virtual visits, including telephone visits, video visits,  and e-visits, especially during this period of COVID-19 coronavirus pandemic.  Please call to schedule another one if you need it!        Most Sincerely,     Adriana Acuña MD                                              To reach your [facility] care team after hours call:   [phone]    PLEASE NOTE OUR HOURS HAVE CHANGED secondary to COVID-19 coronavirus pandemic, as we are trying to minimize patient exposure to the virus,  which is now widespread in the [facility].  These hours may change with very little notice.  We apologize for any inconvenience.       Our current clinic hours are:          Monday- Thursday   7:00am - 6:00pm  in person.      Friday  7:00am- 5:00pm                       Saturday and Sunday : Closed to in person and virtual visits        We have telephone and virtual visit times available between      Those same times as well.                                                Phone:  [phone] press #2 to speak with your care team      Our pharmacy hours:Monday  through Friday 9:00am to 5:00pm                        Saturday - 9:00 am to 12 noon         Sunday : Closed.                ###  Please note: at this time we are not accepting any walk-in  visits. ###      There is also information available at our web site:  [email or web address]    If your provider ordered any lab tests and you do not receive the results within 10 business days, please call the clinic.    If you need a medication refill please contact your pharmacy.  Please allow 2 business days for your refill to be completed.    Our clinic offers telephone visits and e visits.  Please ask one of your team members to explain more.      Use [employer] (secure email communication and access to your chart) to send your primary care provider a message or make an appointment. Ask someone on your Team how to sign up for [employer].     Pharmacy is drive-thru only at this time secondary to the COVID-19 coronavirus pandemic, as we are trying to minimize patient exposure to the virus,  which is now widespread in the state.        There is also information available at our web site:  [email or web address]    If your provider ordered any lab tests and you do not receive the results within 10 business days, please call the clinic.    If you need a medication refill please contact your pharmacy.  Please allow 2 business days for your refill to be completed.

## 2021-03-04 LAB
LABORATORY COMMENT REPORT: NORMAL
SARS-COV-2 RNA RESP QL NAA+PROBE: NEGATIVE
SPECIMEN SOURCE: NORMAL

## 2021-03-19 ENCOUNTER — MYC MEDICAL ADVICE (OUTPATIENT)
Dept: FAMILY MEDICINE | Facility: CLINIC | Age: 31
End: 2021-03-19

## 2021-03-19 ENCOUNTER — VIRTUAL VISIT (OUTPATIENT)
Dept: FAMILY MEDICINE | Facility: CLINIC | Age: 31
End: 2021-03-19
Payer: COMMERCIAL

## 2021-03-19 DIAGNOSIS — F41.9 ANXIETY: Primary | ICD-10-CM

## 2021-03-19 DIAGNOSIS — F41.9 INSOMNIA SECONDARY TO ANXIETY: ICD-10-CM

## 2021-03-19 DIAGNOSIS — F51.05 INSOMNIA SECONDARY TO ANXIETY: ICD-10-CM

## 2021-03-19 DIAGNOSIS — R41.840 ATTENTION AND CONCENTRATION DEFICIT: ICD-10-CM

## 2021-03-19 PROCEDURE — 99214 OFFICE O/P EST MOD 30 MIN: CPT | Mod: GT | Performed by: FAMILY MEDICINE

## 2021-03-19 ASSESSMENT — ANXIETY QUESTIONNAIRES
GAD7 TOTAL SCORE: 2
1. FEELING NERVOUS, ANXIOUS, OR ON EDGE: NOT AT ALL
3. WORRYING TOO MUCH ABOUT DIFFERENT THINGS: SEVERAL DAYS
6. BECOMING EASILY ANNOYED OR IRRITABLE: NOT AT ALL
IF YOU CHECKED OFF ANY PROBLEMS ON THIS QUESTIONNAIRE, HOW DIFFICULT HAVE THESE PROBLEMS MADE IT FOR YOU TO DO YOUR WORK, TAKE CARE OF THINGS AT HOME, OR GET ALONG WITH OTHER PEOPLE: SOMEWHAT DIFFICULT
2. NOT BEING ABLE TO STOP OR CONTROL WORRYING: NOT AT ALL
7. FEELING AFRAID AS IF SOMETHING AWFUL MIGHT HAPPEN: NOT AT ALL
5. BEING SO RESTLESS THAT IT IS HARD TO SIT STILL: NOT AT ALL

## 2021-03-19 ASSESSMENT — PATIENT HEALTH QUESTIONNAIRE - PHQ9
SUM OF ALL RESPONSES TO PHQ QUESTIONS 1-9: 5
5. POOR APPETITE OR OVEREATING: SEVERAL DAYS

## 2021-03-19 NOTE — PROGRESS NOTES
"Rodger is a 30 year old who is being evaluated via a billable video visit.      How would you like to obtain your AVS? MyChart  If the video visit is dropped, the invitation should be resent by: Send to e-mail at: [email or web address]  Will anyone else be joining your video visit? No    Video Start Time: 5:39 PM    Assessment & Plan :       ICD-10-CM    1. 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 Attention and concentration deficit  R41.840 MENTAL HEALTH REFERRAL  - Adult; Assessments and Testing; ADHD; Other: [facility] [phone]; We will contact you to schedule the appointment or please call with any questions      Pt would like to hold on any medication for anxiety at this time, such as an selective serotonin reuptake inhibitor.   May have been on sertraline in college.  Can't recall.     Declines referral for counseling as well - will think about selective serotonin reuptake inhibitor and/or counseling and will let me know if she changes her mind.     Referral for ADHD adult eval.    Discussed meditation before bed and if she awakens at night. No electronics within 30 min of bedtime. Discussed sleep mantra, \"I'm going to sleep now.  When I awaken, I will awaken feeling completely rested.\"     Try not to multitask. Try to be present and focus solely on topic at hand.  See pt instructions. Discussed that ADHD stimulant meds cannot be taken while breastfeeding or pregnant, but sertraline, citalopram and fluoxetine can be taken during both of those.              Regular exercise  See Patient Instructions    No follow-ups on file.         Adriana Acuña MD  [facility] :   Rodger is a 30 year old who presents for the following health issues     DISCUSS ANXIETY AND ATTENTION:      HPI    Anxiety :          How are you doing with your anxiety since your last visit?  always been a little anxious, but now really affecting her day to " day life.  During menses , especially, doesn't sleep well. Is able to fall asleep , but not stay asleep.  Then feels anxious the next day, but attention is not great all the time.  Sometimes feels really distracted and can't focus - ? Secondary to anxiety vs. Attention issues. Has difficultly maintaining her attention at work and at home. Doesn't recall any difficulty with those things in  or College.     Has a 7 month old baby and is still breastfeeding. She feels motherhood has actually made her feel more calm.     Baby is sleeping through the night well.      Back in HS or college was on medication for anxiety or depression. Low dose. Did some counseling/therapy at that time as well. Saw a psychiatrist and therapist.     Feels like she Tries to multi-task too much.      Has a Mirena IUD and doesn't get periods.       Are you having other symptoms that might be associated with depression or anxiety? Yes:  difficulty maintaining attention and focus     Have you had a significant life event? Job Concerns and OTHER: newer job with much more responsibilities; 7 mon old baby      Do you have any concerns with your use of alcohol or other drugs? No    Social History     Tobacco Use     Smoking status: Never Smoker     Smokeless tobacco: Never Used   Substance Use Topics     Alcohol use: Not Currently     Drug use: No     PHQ 11/15/2018 3/19/2021   PHQ-9 Total Score 0 5   Q9: Thoughts of better off dead/self-harm past 2 weeks Not at all Not at all     GOOD-7 SCORE 11/15/2018 3/19/2021   Total Score 3 2     Last PHQ-9 3/19/2021   1.  Little interest or pleasure in doing things 0   2.  Feeling down, depressed, or hopeless 0   3.  Trouble falling or staying asleep, or sleeping too much 2   4.  Feeling tired or having little energy 1   5.  Poor appetite or overeating 0   6.  Feeling bad about yourself 0   7.  Trouble concentrating 2   8.  Moving slowly or restless 0   Q9: Thoughts of better off dead/self-harm past 2 weeks 0    PHQ-9 Total Score 5   Difficulty at work, home, or with people Somewhat difficult     GOOD-7  3/19/2021   1. Feeling nervous, anxious, or on edge 0   2. Not being able to stop or control worrying 0   3. Worrying too much about different things 1   4. Trouble relaxing 1   5. Being so restless that it is hard to sit still 0   6. Becoming easily annoyed or irritable 0   7. Feeling afraid, as if something awful might happen 0   GOOD-7 Total Score 2   If you checked any problems, how difficult have they made it for you to do your work, take care of things at home, or get along with other people? Somewhat difficult       Suicide Assessment Five-step Evaluation and Treatment (SAFE-T):       How many servings of fruits and vegetables do you eat daily?  2-3    On average, how many sweetened beverages do you drink each day (Examples: soda, juice, sweet tea, etc.  Do NOT count diet or artificially sweetened beverages)?   1    How many days per week do you exercise enough to make your heart beat faster? 3 or less    How many minutes a day do you exercise enough to make your heart beat faster? 20 - 29    How many days per week do you miss taking your medication? 0      Review of Systems   Constitutional, HEENT, cardiovascular, pulmonary, gi and gu systems are negative, except as otherwise noted.      Objective           Vitals:  No vitals were obtained today due to virtual visit.    Physical Exam   GENERAL: Healthy, alert and no distress  EYES: Eyes grossly normal to inspection.  No discharge or erythema, or obvious scleral/conjunctival abnormalities.  RESP: No audible wheeze, cough, or visible cyanosis.  No visible retractions or increased work of breathing.    SKIN: Visible skin clear. No significant rash, abnormal pigmentation or lesions.  NEURO: Cranial nerves grossly intact.  Mentation and speech appropriate for age.  PSYCH: Mentation appears normal, affect normal/bright, judgement and insight intact, normal speech and  appearance well-groomed.            Video-Visit Details    Type of service:  Video Visit    Video End Time:6:04 PM    Originating Location (pt. Location): Home    Distant Location (provider location):  [facility] [place]     Platform used for Video Visit: [employer] , tried [employer] first, but pt got kicked out of the waiting room and was unable to re-connect.  Didn't get my email  Invitation.

## 2021-03-19 NOTE — PATIENT INSTRUCTIONS
" [facility]  [address] [place] [id number]  Office: [phone]   Fax:    [phone]       Look on YouTube for \"how to adhd\" channel.      Pt will think about selective serotonin reuptake inhibitor ( such as sertraline, citalopram or fluoxetine)  and/or counseling and will let me know if she changes her mind.     Referral for ADHD adult eval done- our intake folks will call pt to schedule that.     Discussed meditation before bed and if she awakens at night. No electronics within 30 min of bedtime. Discussed sleep mantra, \"I'm going to sleep now.  When I awaken, I will awaken feeling completely rested.\" - repeat that as needed.     Try not to multitask. Try to be present and focus solely on topic at hand.  See pt instructions. Discussed that ADHD stimulant meds cannot be taken while breastfeeding or pregnant, but sertraline, citalopram and fluoxetine can be taken during both of those.           Patient Education     Attention-Deficit/Hyperactivity Disorder (ADHD) in Adults  You ve always had trouble concentrating. Your mind wanders, and it s hard to finish tasks. As a result, you didn t do well in school. And now, you often struggle with your job. Sometimes this makes you almazan or depressed. These may be symptoms of attention-deficit/hyperactivity disorder (ADHD). To find out more, talk to your healthcare provider. He or she can offer guidance and support.   Symptoms of ADHD in adults  For an adult to be diagnosed with ADHD, the symptoms must have been present since childhood. The symptoms may include:     Trouble thinking things through    Low self-esteem    Depression    Trouble holding a job    Memory problems    Problems with a marriage or relationship    Lack of discipline    Trouble finishing tasks or projects  What is ADHD?  Attention-deficit/hyperactivity disorder makes it hard to focus your mind. You may daydream a lot. And you may be restless much of the time. As a " result, you may have trouble with detailed or boring work. And it may be hard to stick with one project for very long. You also may forget things. Or, you may miss key points during a lecture or meeting. You may even have trouble sitting through a movie or concert. At times, you may feel frustrated or angry. This can affect your relationships with others.   Who does it affect?  ADHD starts in childhood. Sometimes, your symptoms may improve as you get older. But they also may persist into your adult years. ADHD is often thought of as a  kid s problem.  That s why it s often missed in adults. In fact, many parents learn they have ADHD when their children are diagnosed.   What causes it?  The exact cause of ADHD isn t known. The disorder does run in families. Having one parent with ADHD makes it more likely you ll have it too. And the part of your brain that controls attention may be involved. Certain brain chemicals that are out of balance may also play a role.   What can be done?  The first step is finding out if you have ADHD. Your doctor will use special guidelines to diagnose the disorder. Most adults with ADHD are greatly helped by some combination of medicine, therapy and coaching.   To learn more    Children and Adults with Attention-Deficit/Hyperactivity Disorder (DAWNA) https://dawna.org/    Attention Deficit Disorder Association (ADDA) https://add.org/    Antoinette last reviewed this educational content on 4/1/2020 2000-2020 [employer]. All rights reserved. This information is not intended as a substitute for professional medical care. Always follow your healthcare professional's instructions.           Patient Education     Treating Attention-Deficit/Hyperactivity Disorder (ADHD) in Adults  Attention-deficit/hyperactivity disorder (ADHD) starts in childhood. It may continue throughout your life. When it does, it may affect your job and even your relationships. But with help, you can manage  ADHD.      Treatment for ADD can help you achieve your goals.   Therapy  Your therapist can help you learn healthy ways to cope with ADHD. Sometimes, your partner or family may attend your sessions with you. This helps them understand more about your disorder and give you additional support.   Coaching  An ADHD  works with you to achieve your goals. You ll learn the best ways to manage your time. You ll also learn to avoid clutter and noise that may distract you. In time, your life will have more order and structure. And your  will provide support and feedback on your progress.   Work  Look for jobs where you can be free and creative. Stay away from those that are dull or centered on details. You may still need to make a special effort. These tips may help:     Try to work at home, at least part time.    Ask for a private office.    Use headphones to muffle noise.    Work on more than one project at the same time. When you get bored with one, switch to the other.    Work on boring tasks when you feel most alert.    Have a schedule for each day and make every effort to stick to it.    Ask your  or  to help with details.    Use a day planner and to-do lists. Write yourself notes or set up reminder alerts on your electronic devices.    Reward yourself when you finish a task.  Medicines  In most cases, medicines can help control symptoms of ADHD. Most often, you'll use medicine along with therapy, coaching, and lifestyle changes. Your healthcare provider may prescribe a stimulant to help you stay focused. Or you may take a type of antidepressant. It may take some time to find what works best for you. Keep in mind that medicines don t cure ADHD. And they may cause side effects such as headaches, trouble sleeping, or stomach problems. Take your medicine as prescribed. If you re bothered by side effects, be sure to tell your healthcare provider. Always talk with your healthcare provider  before making any changes to your medicine.   Edfa3ly last reviewed this educational content on 5/1/2020 2000-2020 [employer]. All rights reserved. This information is not intended as a substitute for professional medical care. Always follow your healthcare professional's instructions.           Patient Education     Coping with Attention Deficit Hyperactivity Disorder (ADHD)  Helpful Tips for Adults  If you have ADHD, it can be hard to sit still, pay attention or control impulsive behavior. ADHD can cause problems in many areas of your life. But you can learn ways to control your symptoms. Below are some ideas for coping with the most common ADHD problems.   Memory, focus and managing time  Be mindful of time.     Use a watch, phone, timer or other device that keeps correct time. Be sure you can see and hear it at all times.    Set more than one alarm to remind you how much time you have left for a task.    Give yourself more time than you think you need.    For important appointments or deadlines, set reminder alarms hours or days ahead of time.  Use a day planner.     A day planner can help you manage time and remember responsibilities.    Learning to use a planner is just like learning to use any tool. Practice makes perfect.  Use lists.     Lists and notes can help you keep track of regular tasks, projects, deadlines and appointments.    If you decide to use a daily planner, keep all lists and notes inside of it. Use color codes for tasks so you know which ones are the most important.  Learn to say no and set boundaries.     Do not take on too many projects or social plans.    Overbooking yourself can be overwhelming and can lead to missed commitments.  Repeat out loud instructions you have been given.     This will help you remember, as well as let others correct you if needed.  Organization  Create space.     Ask yourself what you need on a daily basis. Then, find storage bins or closets  for things you don't need every day.    Have specific areas for things like keys, bills and other items that are easy to misplace.    Throw away or donate things you don't need.  Deal with it now.     You can avoid forgetfulness and clutter by doing things right away, not some time in the future.    This includes filing papers, cleaning up messes, opening and responding to mail and returning phone calls.  Set up a filing system.     Use dividers or separate file folders for different types of documents, such as medical records, receipts and pay stubs.    Label and color-code your files so that you can quickly find what you need.  Break larger tasks into small, manageable pieces.     Write down the steps needed to finish the task. Follow each step in order until the task is done.    If needed, take small, timed breaks and return to finish the task.  Organize your work space.     Use lists or planners to organize your day.    Remove clutter from your desk.    Label any storage bins.    Reduce distraction as much as possible. Ideas include sitting facing the wall, closing your door or using a white noise machine.  Sitting still  Move around as needed.     Don't fight the urge to move around. If you need to fidget or stand up for a period of time to help you pay attention, then do so. But take care that you're not interrupting others.    You may also find it helpful to squeeze a stress ball.  Be active in a useful way.     Exercise can burn off extra energy, relieve stress, boost your mood and calm your mind.    Meditation, yoga or alvin chi can help you better control your attention and impulses.  Stay healthy.     Get enough sleep.    Avoid caffeine late in the day.    Exercise.    Create a relaxing bedtime routine.    Stick to a schedule or daily routine.    Eat small meals throughout the day.    Avoid sugar, eat fewer carbohydrates and eat more protein.  Close relationships  Talk to your loved ones about ADHD.  "    There are many resources to help your loved one understand ADHD. See the Resources section on the next page.  Divide daily tasks based on each person's strengths.     For example, if you have trouble with organization, you may not want to do financial planning tasks.  If you have trouble with focus, develop a sign that others in your life can use as a gentle reminder to pay attention.    The sign should be small but meaningful to you and the other person.  Improve communication.     Use a dry erase board in a common area to help the whole family stay in touch better.    Keep regular to-do lists and compare scheduled activities every day.    Writing notes to each other is also very helpful.  Be an active listener.     Try not to interrupt.    If you find your mind wandering when others are talking, mentally repeat their words so you can follow the conversation.    Ask questions and ask people to repeat what they said if needed.    Pay close attention to body language.  Organize your thoughts.     If you need to have a serious conversation, write a list of the points you would like to make or the important ideas you want to talk about. This can help you stay focused and remember what you need to say.  Think before speaking.     It can be hard to control your impulses when you feel strongly about something.    Before saying whatever pops into your head, stop to ask yourself \"Will this be helpful?\" or \"Will this help me get what I want?\"  Take charge of your life.     Work to accept that some things are harder for you.    Make a plan to address these troubles and seek the support you need.  Resources  Online    ADD Warehouse. www.4meee.com    ADHD and Marriage. www.adhdmarriage.com    ADDitude. www.additudemag.com    Attention Deficit Disorder Association.   www.add.org    Children and Adults with Attention-Deficit/Hyperactivity Disorder (DAWNA). www.dawna.org    Shantelle Dodson. \"33 ADHD Friendly Ways to Get " "Organized with Adult ADHD.\" www.Caspidamag.com/adhd/article/729.html    Doc Farley and Anastasia Bennett. \"ADHD in Adults.\" www.helpguide.org/mental/adhd_add_adult_strategies.htmYou can also find many apps for your phone that can help you with common ADHD struggles.  Books    Silvestre King. ADHD in Adults. (2008).    Silvestre King. Attention-Deficit Hyperactivity Disorder: A Handbook for Diagnosis and Treatment. (2015).    Silvestre King. Taking Charge of Adult ADHD. (2010).    Guzman Ulloa and Grover Chavez. Delivered from Distraction. (2006).    Guzman Ulloa and Grover Chavez. Driven to Distraction. (2011).    Stacey Healy, et al. You Mean I'm Not Lazy, Stupid or Crazy?!: The Classic Self-Help Book for Adults with Attention Deficit Disorder. (2006).    Kelly Ramírez. ADD in the Workplace. (1997).    Kelly Ramírez and Shantelle Dodson. ADD-Friendly Ways to Organize Your Life. (2016).    Kelly Ramírez, Amina Llamas, et al. Understanding Girls with ADHD: How They Feel and Why They Do What They Do. (2015).    Miri Pierre. The ADHD Effect on Marriage: Understand and Rebuild Your Relationship in Six Steps. (2010).    Miri Pierre and Yanelis Luna. The Couple's Guide to Thriving with ADHD. (2014.)  ADHD Support groups    [address]  Visit www.[facility].Ultromex/services/support-groups/adhd-adult-support-group-thursday-eveningsfor information about dates and times, or e-mail [email or web address]    add.org/adhd-support-groups&#047;(online group)  For informational purposes only. Not to replace the advice of your health care provider. Copyright   2014 [facility]. All rights reserved. Clinically reviewed by [facility]. RACTIV 133425 - REV 01/20.                Thank you so much for doing a video visit with us today. And, again, thank you  for choosing our [facility] [place].  Please contact us with any questions that you may have.   We appreciate the opportunity to serve you now and look forward to supporting your healthcare needs for a long time to come!    Our clinic for the foreseeable future and until further notice , will continue to offer virtual visits, including telephone visits, video visits,  and e-visits, especially during this period of COVID-19 coronavirus pandemic.  Please call to schedule another one if you need it!        Most Sincerely,     Adriana Acuña MD                                              To reach your [facility] care team after hours call:   [phone]    PLEASE NOTE OUR HOURS HAVE CHANGED secondary to COVID-19 coronavirus pandemic, as we are trying to minimize patient exposure to the virus,  which is now widespread in the state.  These hours may change with very little notice.  We apologize for any inconvenience.       Our current clinic hours are:         Monday- Thursday   7:00am - 6:00pm  in person.      Friday  7:00am- 5:00pm in person                       Saturday and Sunday : Closed to in person and virtual visits            We have telephone and virtual visit times available between 7:00am - 6pm on             Monday-Friday as well.                                                Phone:  [phone]    Our pharmacy hours: Monday  through Friday 9:00am to 5:00pm                        Saturday - 9:00 am to 12 noon         Sunday : Closed.     ###  Please note: at this time we are not accepting any walk-in visits. ###      There is also information available at our web site:  [email or web address]    If your provider ordered any lab tests and you do not receive the results within 10 business days, please call the clinic.    If you need a medication refill please contact your pharmacy.  Please allow 2 business days for your refill to be completed.    Our clinic offers telephone visits and e visits.  Please ask  one of your team members to explain more.      Use [employer] (secure email communication and access to your chart) to send your primary care provider a message or make an appointment. Ask someone on your Team how to sign up for [employer].     Pharmacy is drive-thru only at this time secondary to the COVID-19 coronavirus pandemic, as we are trying to minimize patient exposure to the virus,  which is now widespread in the state.        There is also information available at our web site:  [email or web address]    If your provider ordered any lab tests and you do not receive the results within 10 business days, please call the clinic.    If you need a medication refill please contact your pharmacy.  Please allow 2 business days for your refill to be completed.

## 2021-03-20 ASSESSMENT — ANXIETY QUESTIONNAIRES: GAD7 TOTAL SCORE: 2

## 2021-03-22 NOTE — TELEPHONE ENCOUNTER
Pt was seen     Ayana Murillo RN, BSN  [facility] [place]

## 2021-04-25 ENCOUNTER — HEALTH MAINTENANCE LETTER (OUTPATIENT)
Age: 31
End: 2021-04-25

## 2021-04-30 ENCOUNTER — NURSE TRIAGE (OUTPATIENT)
Dept: NURSING | Facility: CLINIC | Age: 31
End: 2021-04-30

## 2021-04-30 NOTE — TELEPHONE ENCOUNTER
"May be coming down with mastitis.  Baby is 8 months old.  \"Woke with a very full and more sensitive boob\"  Tenderness persists.  Lumpier than usual.  No warmth  No redness  Afebrile.  Baby is at  today  Mom has pumped several times. The discomfort persists.  Had mastitis in the past and waited too long to get started on an antibiotic.  Asking for a virtual appointment.  Transferred to scheduling.    COVID 19 Nurse Triage Plan/Patient Instructions    Please be aware that novel coronavirus (COVID-19) may be circulating in the community. If you develop symptoms such as fever, cough, or SOB or if you have concerns about the presence of another infection including coronavirus (COVID-19), please contact your health care provider or visit [email or web address].     Disposition/Instructions    Virtual Visit with provider recommended. Reference Visit Selection Guide.    Thank you for taking steps to prevent the spread of this virus.  o Limit your contact with others.  o Wear a simple mask to cover your cough.  o Wash your hands well and often.    Resources    [facility]: About COVID-19: [email or web address]    CDC: What to Do If You're Sick: www.cdc.gov/coronavirus/2019-ncov/about/steps-when-sick.html    CDC: Ending Home Isolation: www.cdc.gov/coronavirus/2019-ncov/hcp/disposition-in-home-patients.html     CDC: Caring for Someone: www.cdc.gov/coronavirus/2019-ncov/if-you-are-sick/care-for-someone.html     [facility]: Interim Guidance for Hospital Discharge to Home: www.health.[facility].[place]/diseases/coronavirus/hcp/hospdischarge.pdf    [facility] clinical trials (COVID-19 research studies): clinicalaffairs.[facility]/um-clinical-trials     Below are the COVID-19 hotlines at the [place] Department of Health ([facility]). Interpreters are available.   o For health questions: Call 304-978-7916 or 1-979.528.8799 (7 a.m. to 7 p.m.)  o For questions about schools and childcare: Call 904-322-5226 or " 1-359.891.1378 (7 a.m. to 7 p.m.)     Additional Information    Negative: SEVERE breast pain with fever > 103 F (39.4 C)    Negative: Mother sounds very sick or weak to the triager    Negative: Breast looks infected (spreading redness, feels hot to touch) with large red area (> 2 in. or 5 cm)    Negative: Breast pain or lump and mother has fever > 100.4 F (38.0 C)    Negative: Breast pain or lump and mother has chills or feeling overall ill    Negative: SEVERE pain not improved 2 hours after pain medicine and care advice    Blocked milk ducts (1 or more tender lumps in the breast)    Protocols used: BREASTFEEDING - MOTHER'S BREAST SYMPTOMS OR [id number]    Michelle LEARY RN [facility]

## 2021-05-25 ENCOUNTER — OFFICE VISIT (OUTPATIENT)
Dept: FAMILY MEDICINE | Facility: CLINIC | Age: 31
End: 2021-05-25
Payer: COMMERCIAL

## 2021-05-25 VITALS
OXYGEN SATURATION: 97 % | TEMPERATURE: 98.3 F | DIASTOLIC BLOOD PRESSURE: 70 MMHG | RESPIRATION RATE: 16 BRPM | HEART RATE: 75 BPM | SYSTOLIC BLOOD PRESSURE: 108 MMHG

## 2021-05-25 DIAGNOSIS — R05.9 COUGH: Primary | ICD-10-CM

## 2021-05-25 LAB
LABORATORY COMMENT REPORT: NORMAL
SARS-COV-2 RNA RESP QL NAA+PROBE: NEGATIVE
SARS-COV-2 RNA RESP QL NAA+PROBE: NORMAL
SPECIMEN SOURCE: NORMAL
SPECIMEN SOURCE: NORMAL

## 2021-05-25 PROCEDURE — 99213 OFFICE O/P EST LOW 20 MIN: CPT | Performed by: NURSE PRACTITIONER

## 2021-05-25 PROCEDURE — U0003 INFECTIOUS AGENT DETECTION BY NUCLEIC ACID (DNA OR RNA); SEVERE ACUTE RESPIRATORY SYNDROME CORONAVIRUS 2 (SARS-COV-2) (CORONAVIRUS DISEASE [COVID-19]), AMPLIFIED PROBE TECHNIQUE, MAKING USE OF HIGH THROUGHPUT TECHNOLOGIES AS DESCRIBED BY CMS-2020-01-R: HCPCS | Performed by: NURSE PRACTITIONER

## 2021-05-25 PROCEDURE — U0005 INFEC AGEN DETEC AMPLI PROBE: HCPCS | Performed by: NURSE PRACTITIONER

## 2021-05-25 NOTE — PROGRESS NOTES
"    Assessment & Plan     Cough  - Symptomatic COVID-19 Virus (Coronavirus) by PCR    Mild symptoms at this time. Monitoring. Discussed red flags and when to contact clinic.    Review of the result(s) of each unique test - lab  Ordering of each unique test  No LOS data to display   Time spent doing chart review, history and exam, documentation and further activities per the note       BMI:   Estimated body mass index is 26.31 kg/m  as calculated from the following:    Height as of 8/13/20: 1.676 m (5' 6\").    Weight as of 8/13/20: 73.9 kg (163 lb).       See Patient Instructions    No follow-ups on file.    Carmen Dominguez [facility] NIKOLAY Soares is a 30 year old who presents for the following health issues     HPI     Acute Illness  Acute illness concerns: Cough  Onset/Duration: started last night -cough- cold symptoms started last week   Symptoms:  Fever: no  Chills/Sweats: no  Headache (location?): no  Sinus Pressure: YES- a little bit   Conjunctivitis:  no  Ear Pain: no  Rhinorrhea: YES- a little  Congestion: YES  Sore Throat: no  Cough: YES-non-productive and productive   Wheeze: YES- a little bit this morning   Decreased Appetite: no  Nausea: no  Vomiting: no  Diarrhea: no  Dysuria/Freq.: no  Dysuria or Hematuria: no  Fatigue/Achiness: no  Sick/Strep Exposure: YES- daughter was sick last week exposed at    Therapies tried and outcome: cough drops     Breastfeeding-daughter and herself had colds, now developed cough as cold was going away.     No other exposures.     A bit tight achy in chest with cough last night. Better today.   Review of Systems   Constitutional, HEENT, cardiovascular, pulmonary, GI, , musculoskeletal, neuro, skin, endocrine and psych systems are negative, except as otherwise noted.      Objective    There were no vitals taken for this visit.  There is no height or weight on file to calculate BMI.  Physical Exam   GENERAL: healthy, alert and no " distress  EYES: Eyes grossly normal to inspection, PERRL and conjunctivae and sclerae normal  HENT: ear canals and TM's normal, nose and mouth without ulcers or lesions  NECK: mild anterior cervical adenopathy, no asymmetry, masses, or scars and thyroid normal to palpation  RESP: lungs clear to auscultation - no rales, rhonchi or wheezes  BREAST: normal without masses, tenderness or nipple discharge and no palpable axillary masses or adenopathy  CV: regular rate and rhythm, normal S1 S2, no S3 or S4, no murmur, click or rub, no peripheral edema and peripheral pulses strong  SKIN: no suspicious lesions or rashes  NEURO: Normal strength and tone, mentation intact and speech normal  PSYCH: mentation appears normal, affect normal/bright              VERONICA Serrano     [address] [address]  [email or web address]   Office: [phone]

## 2021-05-25 NOTE — PATIENT INSTRUCTIONS
"Discharge Instructions for COVID-19 Patients  You have--or may have--COVID-19. Please follow the instructions listed below.   If you have a weakened immune system, discuss with your doctor any other actions you need to take.  How can I protect others?  If you have symptoms (fever, cough, body aches or trouble breathing):    Stay home and away from others (self-isolate) until:  ? Your other symptoms have resolved (gotten better). And   ? You've had no fever--and no medicine that reduces fever--for 1 full day (24 hours). And   ? At least 10 days have passed since your symptoms started. (You may need to wait 20 days. Follow the advice of your care team.)  If you don't show symptoms, but testing showed that you have COVID-19:    Stay home and away from others (self-isolate) until at least 10 days have passed since the date of your first positive COVID-19 test.  During this time    Stay in your own room, even for meals. Use your own bathroom if you can.    Stay away from others in your home. No hugging, kissing or shaking hands. No visitors.    Don't go to work, school or anywhere else.    Clean \"high touch\" surfaces often (doorknobs, counters, handles). Use household cleaning spray or wipes.    You'll find a full list of  on the EPA website: www.epa.gov/pesticide-registration/list-n-disinfectants-use-against-sars-cov-2.    Cover your mouth and nose with a mask or other face covering to avoid spreading germs.    Wash your hands and face often. Use soap and water.    Caregivers in these groups are at risk for severe illness due to COVID-19:  ? People 65 years and older  ? People who live in a nursing home or long-term care facility  ? People with chronic disease (lung, heart, cancer, diabetes, kidney, liver, immunologic)  ? People who have a weakened immune system, including those who:    Are in cancer treatment    Take medicine that weakens the immune system, such as corticosteroids    Had a bone marrow or organ " transplant    Have an immune deficiency    Have poorly controlled HIV or AIDS    Are obese (body mass index of 40 or higher)    Smoke regularly    Caregivers should wear gloves while washing dishes, handling laundry and cleaning bedrooms and bathrooms.    Use caution when washing and drying laundry: Don't shake dirty laundry and use the warmest water setting that you can.    For more tips on managing your health at home, go to www.cdc.gov/coronavirus/2019-ncov/downloads/10Things.pdf.  How can I take care of myself at home?  1. Get lots of rest. Drink extra fluids (unless a doctor has told you not to).  2. Take Tylenol (acetaminophen) for fever or pain. If you have liver or kidney problems, ask your family doctor if it's okay to take Tylenol.   Adults can take either:   ? 650 mg (two 325 mg pills) every 4 to 6 hours, or   ? 1,000 mg (two 500 mg pills) every 8 hours as needed.  ? Note: Don't take more than 3,000 mg in one day. Acetaminophen is found in many medicines (both prescribed and over-the-counter medicines). Read all labels to be sure you don't take too much.   For children, check the Tylenol bottle for the right dose. The dose is based on the child's age or weight.  3. If you have other health problems (like cancer, heart failure, an organ transplant or severe kidney disease): Call your specialty clinic if you don't feel better in the next 2 days.  4. Know when to call 911. Emergency warning signs include:  ? Trouble breathing or shortness of breath  ? Pain or pressure in the chest that doesn't go away  ? Feeling confused like you haven't felt before, or not being able to wake up  ? Bluish-colored lips or face  5. Your doctor may have prescribed a blood thinner medicine. Follow their instructions.  Where can I get more information?     [employer] [place] - About COVID-19:   [email or web address]    CDC - What to Do If You're Sick:  www.cdc.gov/coronavirus/2019-ncov/about/steps-when-sick.html    CDC - Ending Home Isolation: www.cdc.gov/coronavirus/2019-ncov/hcp/disposition-in-home-patients.html    CDC - Caring for Someone: www.cdc.gov/coronavirus/2019-ncov/if-you-are-sick/care-for-someone.html    [facility] - Interim Guidance for Hospital Discharge to Home: www.health.[facility].[place]./diseases/coronavirus/hcp/hospdischarge.pdf    Below are the COVID-19 hotlines at the [place] Department of [facility] ([facility]). Interpreters are available.  ? For health questions: Call 416-294-6849 or 1-612.271.6547 (7 a.m. to 7 p.m.)  ? For questions about schools and childcare: Call 292-687-6472 or 1-384.712.8580 (7 a.m. to 7 p.m.)    For informational purposes only. Not to replace the advice of your health care provider. Clinically reviewed by Dr. Leon Palacio.   Copyright   2020 [facility]. All rights reserved. [employer] [id number] - REV 01/05/21.

## 2021-05-28 ENCOUNTER — MYC MEDICAL ADVICE (OUTPATIENT)
Dept: FAMILY MEDICINE | Facility: CLINIC | Age: 31
End: 2021-05-28

## 2021-10-10 ENCOUNTER — HEALTH MAINTENANCE LETTER (OUTPATIENT)
Age: 31
End: 2021-10-10

## 2021-10-22 ENCOUNTER — OFFICE VISIT (OUTPATIENT)
Dept: URGENT CARE | Facility: URGENT CARE | Age: 31
End: 2021-10-22
Payer: COMMERCIAL

## 2021-10-22 VITALS
DIASTOLIC BLOOD PRESSURE: 64 MMHG | HEART RATE: 94 BPM | WEIGHT: 138 LBS | TEMPERATURE: 99.1 F | BODY MASS INDEX: 22.27 KG/M2 | OXYGEN SATURATION: 98 % | SYSTOLIC BLOOD PRESSURE: 118 MMHG

## 2021-10-22 DIAGNOSIS — J01.00 ACUTE NON-RECURRENT MAXILLARY SINUSITIS: Primary | ICD-10-CM

## 2021-10-22 PROCEDURE — 99213 OFFICE O/P EST LOW 20 MIN: CPT | Performed by: FAMILY MEDICINE

## 2021-10-22 RX ORDER — FLUTICASONE PROPIONATE 50 MCG
1-2 SPRAY, SUSPENSION (ML) NASAL DAILY
Qty: 16 G | Refills: 0 | Status: SHIPPED | OUTPATIENT
Start: 2021-10-22 | End: 2021-12-06

## 2021-10-22 NOTE — PATIENT INSTRUCTIONS
Patient Education     Sinusitis (Antibiotic Treatment)    The sinuses are air-filled spaces within the bones of the face. They connect to the inside of the nose. Sinusitis is an inflammation of the tissue that lines the sinuses. Sinusitis can occur during a cold. It can also happen due to allergies to pollens and other particles in the air. Sinusitis can cause symptoms of sinus congestion and a feeling of fullness. A sinus infection causes fever, headache, and facial pain. There is often green or yellow fluid draining from the nose or into the back of the throat (post-nasal drip). You have been given antibiotics to treat this condition.   Home care    Take the full course of antibiotics as instructed. Don't stop taking them, even when you feel better.    Drink plenty of water, hot tea, and other liquids as directed by the healthcare provider. This may help thin nasal mucus. It also may help your sinuses drain fluids.    Heat may help soothe painful areas of your face. Use a towel soaked in hot water. Or,  the shower and direct the warm spray onto your face. Using a vaporizer along with a menthol rub at night may also help soothe symptoms.     An expectorant with guaifenesin may help thin nasal mucus and help your sinuses drain fluids. Talk with your provider or pharmacists before taking an over-the-counter (OTC) medicine if you have any questions about it or its side effects..    You can use an OTC decongestant, unless a similar medicine was prescribed to you. Nasal sprays work the fastest. Use one that contains phenylephrine or oxymetazoline. First blow your nose gently. Then use the spray. Don't use these medicines more often than directed on the label. If you do, your symptoms may get worse. You may also take pills that contain pseudoephedrine. Don t use products that combine multiple medicines. This is because side effects may be increased. Read labels. You can also ask the pharmacist for help. (People  with high blood pressure should not use decongestants. They can raise blood pressure.) Talk with your provider or pharmacist if you have any questions about the medicine..    OTC antihistamines may help if allergies contributed to your sinusitis. Talk with your provider or pharmacist if you have any questions about the medicine..    Don't use nasal rinses or irrigation during an acute sinus infection, unless your healthcare provider tells you to. Rinsing may spread the infection to other areas in your sinuses.    Use acetaminophen or ibuprofen to control pain, unless another pain medicine was prescribed to you. If you have chronic liver or kidney disease or ever had a stomach ulcer, talk with your healthcare provider before using these medicines. Never give aspirin to anyone under age 18 who is ill with a fever. It may cause severe liver damage.    Don't smoke. This can make symptoms worse.    Follow-up care  Follow up with your healthcare provider, or as advised.   When to seek medical advice  Call your healthcare provider if any of these occur:     Facial pain or headache that gets worse    Stiff neck    Unusual drowsiness or confusion    Swelling of your forehead or eyelids    Symptoms don't go away in 10 days    Vision problems, such as blurred or double vision    Fever of 100.4 F (38 C) or higher, or as directed by your healthcare provider  Call 911  Call 911 if any of these occur:     Seizure    Trouble breathing    Feeling dizzy or faint    Fingernails, skin or lips look blue, purple , or gray  Prevention  Here are steps you can take to help prevent an infection:     Keep good hand washing habits.    Don t have close contact with people who have sore throats, colds, or other upper respiratory infections.    Don t smoke, and stay away from secondhand smoke.    Stay up to date with of your vaccines.  [employer] last reviewed this educational content on 12/1/2019 2000-2021 [employer]. All rights  reserved. This information is not intended as a substitute for professional medical care. Always follow your healthcare professional's instructions.

## 2021-10-22 NOTE — PROGRESS NOTES
ASSESSMENT/  PLAN:  Acute non-recurrent maxillary sinusitis     - amoxicillin-clavulanate (AUGMENTIN) 875-125 MG tablet; Take 1 tablet by mouth 2 times daily for 10 days  - fluticasone (FLONASE) 50 MCG/ACT nasal spray; Spray 1-2 sprays into both nostrils daily     We discussed the primary importance of home cares to promote drainage and ventilation of the sinuses to decrease symptoms of sinus pressure and to eliminate infectious drainage from the sinuses.  I encouraged the use of saline nasal spray as needed to promote cleaning of the nasal passages and to promote drainage of the sinuses.  Allergy medications and steroid nasal spray help reduce swelling within the nasal tissue and may help open drainage/ ventilation passages to the sinuses.  Expectorants are recommended rather than decongestants to help promote sinus drainage.  Antibiotics are discussed as a secondary therapy for sinus infections that are unresponsive to home measures to promote sinus drainage and ventilation.     Follow up with primary clinic if not improving  --------------------------------------------------------------------------------------------------------------    SUBJECTIVE:  Chief Complaint   Patient presents with     Sinus Problem     congestion, pressure x 1 month on and  off, more on the left side -- took some  Ibuprofen     Allison J Bazinet is a 30 year old female here with concerns about sinus infection.  She states onset of symptoms were 1 month(s) ago.  She has had maxillary, frontal pressure. Course of illness is worsening. Severity moderate  Current and Associated symptoms: nasal congestion, facial pain/pressure and headache  Predisposing factors include recent illness. Recent treatment has included: OTC meds    Past Medical History:   Diagnosis Date     History of chicken pox      IUD (intrauterine device) in place 02/01/2014    has appt for replacement 1/2019 with [facility]       Migraine with aura and without status  migrainosus, not intractable 11/15/2018     PIH (pregnancy induced hypertension) 8/13/2020     PNA (pneumonia) 05/2016    LLL     PPD screening test     Test was negative, she had exposure in [place]     Patient Active Problem List   Diagnosis     Anxiety - more social anxiety      Abdominal bloating     IUD (intrauterine device) in place - Mirena IUD placed 10/13/2020 at 8 wks postpartum - replace in 2025      Migraine with aura and without status migrainosus, not intractable- can't do estrogen/combination ocp's      [place] sickness preventative measures     PIH (pregnancy induced hypertension)     Indication for care in labor or delivery     Vaginal delivery     Insomnia secondary to anxiety     Attention and concentration deficit- no previous dx of ADHD - would like to do testing          ALLERGIES:  Patient has no known allergies.    ferrous sulfate (FE TABS) 325 (65 Fe) MG EC tablet, Take 1 tablet (325 mg) by mouth daily (Patient not taking: Reported on 3/19/2021)  ibuprofen (ADVIL/MOTRIN) 800 MG tablet, Take 1 tablet (800 mg) by mouth every 8 hours as needed for moderate pain (Patient not taking: Reported on 3/19/2021)  SUMAtriptan (IMITREX) 50 MG tablet, Take 1 tab @ onset of migraine, may repeat x 1-2 tabs in 2 hours (Patient not taking: Reported on 3/19/2021)    No current facility-administered medications on file prior to visit.      Social History     Tobacco Use     Smoking status: Never Smoker     Smokeless tobacco: Never Used   Substance Use Topics     Alcohol use: Not Currently       Family History   Problem Relation Age of Onset     Thyroid Disease Mother         partial thyroidectomy     Osteopenia Mother      Eye Disorder Maternal Grandmother         macular degeneration     Heart Disease Maternal Grandmother      Cerebrovascular Disease Maternal Grandfather         stroke and heart issues      Cardiac Sudden Death Maternal Grandfather 45     Cancer Maternal Uncle         CLL      Hypertension  Father        ROS:  CONSTITUTIONAL:NEGATIVE for fever, chills,   INTEGUMENTARY/SKIN: NEGATIVE for worrisome rashes,  or lesions  EYES: NEGATIVE for vision changes or irritation  RESP:NEGATIVE for significant cough or SOB  GI: NEGATIVE for nausea, abdominal pain, or change in bowel habits    OBJECTIVE:  /64 (BP Location: Right arm, Patient Position: Chair, Cuff Size: Adult Regular)   Pulse 94   Temp 99.1  F (37.3  C) (Oral)   Wt 62.6 kg (138 lb)   SpO2 98%   Breastfeeding No   BMI 22.27 kg/m    GENERAL APPEARANCE: alert, moderate distress and cooperative  EYES: EOMI,  PERRL, conjunctiva clear  HENT: ear canals and TM's normal.  Nose normal.  Pharynx erythematous with some exudate noted.  HENT: frontal sinus tenderness  and maxillary sinus tenderness   NECK: supple, non-tender to palpation, no adenopathy noted  RESP: lungs clear to auscultation - no rales, rhonchi or wheezes  CV: regular rates and rhythm, normal S1 S2, no murmur noted  SKIN: no suspicious lesions or rashes

## 2021-11-09 ENCOUNTER — VIRTUAL VISIT (OUTPATIENT)
Dept: FAMILY MEDICINE | Facility: CLINIC | Age: 31
End: 2021-11-09
Payer: COMMERCIAL

## 2021-11-09 DIAGNOSIS — J01.01 ACUTE RECURRENT MAXILLARY SINUSITIS: Primary | ICD-10-CM

## 2021-11-09 PROCEDURE — 99213 OFFICE O/P EST LOW 20 MIN: CPT | Mod: GT | Performed by: NURSE PRACTITIONER

## 2021-11-09 RX ORDER — DOXYCYCLINE 100 MG/1
100 CAPSULE ORAL 2 TIMES DAILY
Qty: 14 CAPSULE | Refills: 0 | Status: SHIPPED | OUTPATIENT
Start: 2021-11-09 | End: 2021-12-06

## 2021-11-09 NOTE — PATIENT INSTRUCTIONS
--Saline nasal spray or a adriana pot can be helpful in clearing out the sinuses and making them feel better. Also, sleep propped up on an extra pillow to help with drainage.  --Using a humidifier at night will also add moisture to the air and help with symptoms.  --Guaifenesen(Mucinex 12 hour) can be used to help loosen and thin mucus. Take as directed.  -Avoid use of other over the counter medications, ideally we want mucus to drain to prevent further infection from developing.  --Ibuprofen or Tylenol as directed is ok for headache or sinus pressure.     Separate your vitamins or supplements from antibiotic by 2 hours.

## 2021-11-09 NOTE — PROGRESS NOTES
Rodger is a 30 year old who is being evaluated via a billable video visit.      How would you like to obtain your AVS? MyChart  If the video visit is dropped, the invitation should be resent by: Text to cell phone: see file  Will anyone else be joining your video visit? No    Video Start Time: 140    Assessment & Plan   Problem List Items Addressed This Visit     None      Visit Diagnoses     Acute recurrent maxillary sinusitis    -  Primary    Relevant Medications    doxycycline monohydrate (MONODOX) 100 MG capsule         Prescription drug management     Patient Instructions   --Saline nasal spray or a adriana pot can be helpful in clearing out the sinuses and making them feel better. Also, sleep propped up on an extra pillow to help with drainage.  --Using a humidifier at night will also add moisture to the air and help with symptoms.  --Guaifenesen(Mucinex 12 hour) can be used to help loosen and thin mucus. Take as directed.  -Avoid use of other over the counter medications, ideally we want mucus to drain to prevent further infection from developing.  --Ibuprofen or Tylenol as directed is ok for headache or sinus pressure.     Separate your vitamins or supplements from antibiotic by 2 hours.         Return in about 2 weeks (around 11/23/2021) for symptoms failing to improve or worsening.    Carmen Dominguez [facility]   Rodger is a 30 year old who presents for the following health issues     HPI     Acute Illness  Acute illness concerns: sinus  Onset/Duration: 2 mo  Symptoms:  Fever: no  Chills/Sweats: no  Headache (location?): YES  Sinus Pressure: YES  Conjunctivitis:  no  Ear Pain: no  Rhinorrhea: YES  Congestion: YES  Sore Throat: YES  Cough: no  Wheeze: no  Decreased Appetite: no  Nausea: no  Vomiting: no  Diarrhea: no  Dysuria/Freq.: no  Dysuria or Hematuria: YES  Fatigue/Achiness: YES  Sick/Strep Exposure: no  Therapies tried and outcome: amoxicillin from urgent care.   Finished last week       Mid to late September, left sided facial pain, wne tto UC and improved partially on augmentin. Worsening now again.   Nasal drainage has an odor to it.     Review of Systems   Constitutional, HEENT, cardiovascular, pulmonary, GI, , musculoskeletal, neuro, skin, endocrine and psych systems are negative, except as otherwise noted.      Objective           Vitals:  No vitals were obtained today due to virtual visit.    Physical Exam   GENERAL: Healthy, alert and no distress  EYES: Eyes grossly normal to inspection.  No discharge or erythema, or obvious scleral/conjunctival abnormalities.  RESP: No audible wheeze, cough, or visible cyanosis.  No visible retractions or increased work of breathing.    SKIN: Visible skin clear. No significant rash, abnormal pigmentation or lesions.  NEURO: Cranial nerves grossly intact.  Mentation and speech appropriate for age.  PSYCH: Mentation appears normal, affect normal/bright, judgement and insight intact, normal speech and appearance well-groomed.              VERONICA Serrano     [address] [address]  [email or web address]   Office: [phone]                 Video-Visit Details    Type of service:  Video Visit    Video End Time:1:47 PM    Originating Location (pt. Location): Home    Distant Location (provider location):  [facility] [place]     Platform used for Video Visit: [employer]

## 2021-12-02 ASSESSMENT — ANXIETY QUESTIONNAIRES
7. FEELING AFRAID AS IF SOMETHING AWFUL MIGHT HAPPEN: NOT AT ALL
6. BECOMING EASILY ANNOYED OR IRRITABLE: MORE THAN HALF THE DAYS
GAD7 TOTAL SCORE: 5
2. NOT BEING ABLE TO STOP OR CONTROL WORRYING: SEVERAL DAYS
GAD7 TOTAL SCORE: 5
4. TROUBLE RELAXING: NOT AT ALL
5. BEING SO RESTLESS THAT IT IS HARD TO SIT STILL: NOT AT ALL
GAD7 TOTAL SCORE: 5
3. WORRYING TOO MUCH ABOUT DIFFERENT THINGS: SEVERAL DAYS
8. IF YOU CHECKED OFF ANY PROBLEMS, HOW DIFFICULT HAVE THESE MADE IT FOR YOU TO DO YOUR WORK, TAKE CARE OF THINGS AT HOME, OR GET ALONG WITH OTHER PEOPLE?: SOMEWHAT DIFFICULT
7. FEELING AFRAID AS IF SOMETHING AWFUL MIGHT HAPPEN: NOT AT ALL
1. FEELING NERVOUS, ANXIOUS, OR ON EDGE: SEVERAL DAYS

## 2021-12-03 ASSESSMENT — ANXIETY QUESTIONNAIRES: GAD7 TOTAL SCORE: 5

## 2021-12-06 ENCOUNTER — VIRTUAL VISIT (OUTPATIENT)
Dept: FAMILY MEDICINE | Facility: CLINIC | Age: 31
End: 2021-12-06
Payer: COMMERCIAL

## 2021-12-06 DIAGNOSIS — F41.9 ANXIETY: Primary | ICD-10-CM

## 2021-12-06 DIAGNOSIS — F41.0 PANIC ATTACK: ICD-10-CM

## 2021-12-06 PROCEDURE — 99214 OFFICE O/P EST MOD 30 MIN: CPT | Mod: GT | Performed by: FAMILY MEDICINE

## 2021-12-06 RX ORDER — CITALOPRAM HYDROBROMIDE 10 MG/1
10 TABLET ORAL DAILY
Qty: 60 TABLET | Refills: 1 | Status: SHIPPED | OUTPATIENT
Start: 2021-12-06 | End: 2022-01-17

## 2021-12-06 RX ORDER — PROPRANOLOL HYDROCHLORIDE 10 MG/1
10 TABLET ORAL 3 TIMES DAILY PRN
Qty: 20 TABLET | Refills: 1 | Status: SHIPPED | OUTPATIENT
Start: 2021-12-06 | End: 2023-02-12

## 2021-12-06 ASSESSMENT — PATIENT HEALTH QUESTIONNAIRE - PHQ9
10. IF YOU CHECKED OFF ANY PROBLEMS, HOW DIFFICULT HAVE THESE PROBLEMS MADE IT FOR YOU TO DO YOUR WORK, TAKE CARE OF THINGS AT HOME, OR GET ALONG WITH OTHER PEOPLE: SOMEWHAT DIFFICULT
SUM OF ALL RESPONSES TO PHQ QUESTIONS 1-9: 5
SUM OF ALL RESPONSES TO PHQ QUESTIONS 1-9: 5

## 2021-12-06 NOTE — PATIENT INSTRUCTIONS
[facility]  [address] [place] [address]  Office: [phone]   Fax:    [phone]       Mental health referral placed. Counseling coverage is very insurance driven. An intake person will call pt , but they  can narrow down possible choices by going to [employer] Search.  In the search field type in therapists or counselors in the the [place] metro area and a bunch of therapists choices will come up with their pictures, backrounds, education and their areas of expertise.     Return in 5 weeks (on 1/10/2022) for anxiety follow up, with Dr. Acuña, as a video visit through [employer]. at 10:40am as scheduled.        Patient Education     Treating Anxiety Disorders with Medicine  An anxiety disorder can make you feel nervous or apprehensive, even without a clear reason. In people age 65 and older, generalized anxiety disorder is one of the most commonly diagnosed anxiety disorders. Many times it occurs with depression. Certain anxiety disorders can cause intense feelings of fear or panic. You may even have physical symptoms such as a racing heartbeat, sweating, or dizziness. If you have these feelings, you don t have to suffer anymore. Treatment to help you overcome your fears will likely include therapy (also called counseling). Medicine may also be prescribed to help control your symptoms.     Medicines  Certain medicines may be prescribed to help control your symptoms. So you may feel less anxious. You may also feel able to move forward with therapy. At first, medicines and dosages may need to be adjusted to find what works best for you. Try to be patient. Tell your healthcare provider how a medicine makes you feel. This way, you can work together to find the treatment that s best for you. Keep in mind that medicines can have side effects. Talk with your provider about any side effects that are bothering you. Changing the dose or type of medicine may help. Don t  stop taking medicine on your own. That can cause symptoms to come back or cause dangerous withdrawal symptoms.     Anti-anxiety medicine. This medicine eases symptoms and helps you relax. Your healthcare provider will explain when and how to use it. It may be prescribed for use before situations that make you anxious. You may also be told to take medicine on a regular schedule. Anti-anxiety medicine may make you feel a little sleepy or  out of it.  Don t drive a car or operate machinery while on this medicine, until you know how it affects you.  Never use alcohol or other drugs with anti-anxiety medicines. This could result in loss of muscular control, sedation, coma, or death. Also, use only the amount of medicine prescribed for you. If you think you may have taken too much, get emergency care right away. Never share your medicines with others. Store these medicines in a safe place that can't be reached by children or visitors.   Keep taking medicines as prescribed  Never change your dosage, share or use another person's medicine, or stop taking your medicines without talking to your healthcare provider first. Keep the following in mind:     Some medicines must be taken on a schedule. Make this part of your daily routine. For instance, always take your pill before brushing your teeth. A pillbox can help you remember if you ve taken your medicine each day.    Medicines are often taken for 6 to 12 months. Your healthcare provider will then evaluate whether you need to stay on them. Many people who have also had therapy may no longer need medicine to manage anxiety.    You may need to stop taking medicine slowly to give your body time to adjust. When it s time to stop, your healthcare provider will tell you more. Remember: Never stop taking your medicine without talking to your provider first.    If symptoms return, you may need to start taking medicines again.  This isn t your fault. It s just the nature of your  anxiety disorder.  What to think about    Side effects. Medicines may cause side effects. Ask your healthcare provider or pharmacist what you can expect. They may have ideas for avoiding some side effects.    Sexual problems. Some antidepressants can affect your desire for sex or your ability to have an orgasm. A change in dosage or medicine often solves the problem. If you have a sexual side effect that concerns you, tell your healthcare provider.    Addiction. If you ve never had a problem with drugs or alcohol, you may not have a problem with medicines used to treat anxiety disorders. But always discuss the medicines with your healthcare provider before taking them. If you have a history of addiction, you may not be able to use certain medicines used to treat anxiety disorders.    Medicine interactions. Always check with your pharmacist before using any over-the-counter medicines (OTCs), including herbal supplements. Some OTCs may interact with your anti-anxiety medicines and increase or decrease their effectiveness.    StayWell last reviewed this educational content on 12/1/2019 2000-2021 [employer]. All rights reserved. This information is not intended as a substitute for professional medical care. Always follow your healthcare professional's instructions.           Patient Education     Treating Anxiety Disorders with Therapy    If you have an anxiety disorder, you don t have to suffer needlessly. Treatment is available. Therapy (also called counseling) is often a helpful treatment for anxiety disorders. With therapy, a trained professional (therapist) helps you face and learn to manage your anxiety. Therapy can be short-term or long-term based on your needs. In some cases, medicine may also be prescribed with therapy. It may take time before you notice how much therapy is helping, but stick with it. With therapy, you can feel better.   Cognitive behavioral therapy (CBT)  Cognitive behavioral  therapy (CBT) teaches you to manage anxiety. It does this by helping you understand how you think and act when you re anxious. Research has shown CBT to be a very effective treatment for anxiety disorders. CBT involves homework and activities to build skills that teach you to cope with anxiety step by step. It can be done in a group or 1-on-1, and often takes place for a set number of sessions. CBT has 2 main parts:     Cognitive therapy. This helps you identify the negative, irrational thoughts that occur with your anxiety. You ll learn to replace these with more positive, realistic thoughts.    Behavioral therapy. This helps you change how you react to anxiety. You ll learn coping skills and methods for relaxing to help you better deal with anxiety.  Other forms of therapy  Other therapy methods may work better for you than CBT. Or, you may move from CBT to another form of therapy as your treatment needs change. This may mean meeting with a therapist by yourself or in a group. Therapy can also help you work through problems in your life, such as drug or alcohol dependence, that may be making your anxiety worse.   Getting better takes time  Therapy will help you feel better and teach you skills to help manage anxiety long term. But change doesn t happen right away. It takes a commitment from you. And treatment only works if you learn to face the causes of your anxiety. So, you might feel worse before you feel better. This can sometimes make it hard to stick with it. But remember: Therapy is a very effective treatment. The results will be well worth it.   Helping yourself  If anxiety is wearing you down, here are some things you can do to cope:    Check with your healthcare provider and rule out any physical problems that may be causing the anxiety symptoms.    If you are diagnosed with an anxiety disorder, seek mental healthcare. This is an illness and it can respond to treatment. Most types of anxiety disorders  will respond to talk therapy and medicine.    Educate yourself about anxiety disorders. Keep track of helpful online resources and books you can use during stressful periods.    Try stress management methods such as meditation.    Consider online or in-person support groups.    Don t fight your feelings. Anxiety feeds itself. The more you worry about it, the worse it gets. Instead, try to identify what might have triggered your anxiety. Then try to put this threat in perspective.    Keep in mind that you can t control everything about a situation. Change what you can and let the rest take its course.    Exercise -- it s a great way to relieve tension and help your body feel relaxed.    Examine your life for stress, and try to find ways to reduce it.    Avoid caffeine and nicotine. These can make anxiety symptoms worse.    Don't turn to alcohol or unprescribed medicines for relief. They only make things worse in the long run.  Antoinette last reviewed this educational content on 5/1/2020 2000-2021 [employer]. All rights reserved. This information is not intended as a substitute for professional medical care. Always follow your healthcare professional's instructions.           Patient Education     Your Body s Response to Anxiety  Normal anxiety is part of the body s natural defense system. It's an alert to a threat that is unknown, vague, or comes from your own internal fears. While you re in this state, your feelings can range from a vague sense of worry to physical sensations such as a pounding heartbeat. These feelings make you want to react to the threat. An anxiety response is normal in many situations. But when you have an anxiety disorder, the same response can occur at the wrong times.   Anxiety can be helpful  Normal anxiety is a signal from your brain. It warns you of a threat. It's a normal response to help you prevent something. Or to decrease the bad effects of something you can't  control. For example, anxiety is a normal response to situations that might harm your body, separate you from a loved one, or lose your job. The symptoms of anxiety can be physical and mental.   How does it feel?  People with anxiety may have:    Dizziness    Muscle tension or pain    Restlessness    Sleeplessness    Trouble focusing    Racing heartbeat    Fast breathing    Shaking or trembling    Stomachache    Diarrhea    Loss of energy    Sweating    Cold, clammy hands    Chest pain    Dry mouth  Anxiety can also be a problem  Anxiety can become a problem when it is hard to control, occurs for months, and interferes with important parts of your life. With an anxiety disorder, your body has the response described above, but in inappropriate ways. The response a person has depends on the anxiety disorder he or she has. With some disorders, the anxiety is way out of proportion to the threat that triggers it. With others, anxiety may occur even when there isn t a clear threat or trigger.   Who does it affect?  Some people are more likely to have lasting anxiety than others. It tends to run in families. And it affects more younger people than older people, and more women than men. But no age, race, or gender is immune to anxiety problems.   Anxiety can be treated  The good news is that the anxiety that s disrupting your life can be treated. Check with your healthcare provider and rule out any physical problems that may be causing the anxiety symptoms. If an anxiety disorder is diagnosed, seek mental healthcare. This is an illness and it can respond to treatment. Most types of anxiety disorders will respond to talk therapy (counseling) and medicines. Working with your doctor or other healthcare provider, you can develop skills to help you cope with anxiety. You can also gain the perspective you need to overcome your fears. Good sources of support or guidance can be found at your local hospital, mental health clinic, or  an employee assistance program.     How to cope with anxiety  Here are some things you can do to cope:    Do what you can.  Keep in mind that you can t control everything. Change what you can. And let the rest take its course.    Exercise. This is a great way to ease tension and help your body feel relaxed.    Stay away from caffeine and nicotine.  These can make anxiety symptoms worse.    Stay sober.  Don't use alcohol or unprescribed medicines. They only make things worse in the long run.    Learn more about anxiety disorders.  Keep track of helpful online resources and books you can use during stressful periods.    Try stress management. Try methods such as meditation.    Talk with others. Think about joining online or in-person support groups.    Get help. Find professional mental health services if your symptoms can't be managed or reduced with the above methods.  Talisma last reviewed this educational content on 4/1/2020 2000-2021 [employer]. All rights reserved. This information is not intended as a substitute for professional medical care. Always follow your healthcare professional's instructions.           Patient Education     Treating Panic Disorder with Medicine    Panic disorder is a type of anxiety disorder characterized by panic attacks. A panic attack is a sudden, intense fear that lasts for several minutes when there is no real danger. It's accompanied by terror, severe physical symptoms, and a strong need to escape wherever you are. If you have panic disorder, your healthcare provider may prescribe one or more medicines for treatment. Common medicines are described below.    Types of medicines  Medicines to treat panic disorder include:    Anti-anxiety medicine. This medicine relieves symptoms and helps you relax. Your healthcare provider will explain when and how to use it. It may be prescribed for use before entering a situation that makes you anxious. Or you may be told to  take it on a regular schedule. Anti-anxiety medicine may make you feel a little sleepy or out of it. Don't drive a car or operate machinery while on this medicine, until you know how it affects you.    Antidepressant medicine. This kind of medicine is often used to treat anxiety, even if you aren't depressed. An antidepressant balances brain chemicals. This helps keep anxiety under control. This medicine is taken on a schedule. It takes a few weeks to start working. If you don't notice a change at first, you may just need more time. But if you don't notice results after the first few weeks, tell your provider.  Tips for taking medicines  Never change your dosage or stop taking your medicines without talking to your healthcare provider first. Never share your medicine or use someone else s medicine, even if it's the same medicine and dosage. Keep the following in mind:     Some medicines must be taken on a schedule. Make this part of a daily routine. For instance, always take your pill before brushing your teeth. A pillbox can help you remember if you've taken your medicine each day.    Medicines are often taken for 6 to 12 months. Your healthcare provider will then evaluate whether you need to stay on them. Many people who have also had therapy may no longer need medicine to manage anxiety.    You may need to stop taking medicine slowly. This will give your body time to adjust. When it's time to stop, your provider will tell you more.    If symptoms return, you may need to start taking medicines again.  This isn't your fault. It's just the nature of your anxiety disorder.  Special concerns    Side effects. Medicines may cause side effects. Ask your healthcare provider or pharmacist what you can expect. They may have ideas for avoiding some side effects. You can also check the package insert to learn more about side effects.    Sexual problems. Some antidepressants can affect your desire for sex or your ability to  have regular orgasms. A change in dosage or medicine often solves the problem. If you have a sexual side effect that concerns you, tell your provider.    Addiction. Antidepressants are not addictive. And if you've never had a problem with drugs or alcohol, you likely won't have a problem with anti-anxiety medicine. But if you have a history of addiction, you may need to avoid this medicine. Let your provider know if you have an addiction history.    [employer] last reviewed this educational content on 5/1/2020 2000-2021 [employer]. All rights reserved. This information is not intended as a substitute for professional medical care. Always follow your healthcare professional's instructions.           Patient Education     Treating Insomnia     Learning to relax before bedtime can improve your sleep.   Good sleeping habits are a key part of treatment. If needed, some medicines may help you sleep better at first. Making healthy lifestyle changes and learning to relax can improve your sleep. Treating insomnia takes commitment. But trust that your efforts will pay off. Be sure to talk with your healthcare provider before taking any medicine.  Healthy lifestyle  Your lifestyle affects your health and your sleep. Here are some healthy habits:    Keep a regular sleep schedule. Go to bed and get up at the same time each day.    Exercise regularly. It may help you reduce stress. Avoid strenuous exercise for 2 to 4 hours before bedtime.    Avoid or limit naps, especially in the late afternoon.    Use your bed only for sleep and sex.    Don t spend too much time in bed trying to fall asleep. If you can t fall asleep, get up and do something (no electronics) until you become tired and drowsy.    Avoid or limit caffeine and nicotine for up to 6 hours before bedtime. They can keep you awake at night.     Also avoid alcohol for at least 4 to 6 hours before bedtime. It may help you fall asleep at first. But you will  have more awakenings during the night. And your sleep will not be restful.  Before bedtime  To sleep better every night, try these tips:    Have a bedtime routine to let your body and mind know when it s time to sleep.    Think of going to bed as relaxing and enjoyable. Sleep will come sooner.    If your worries don t let you sleep, write them down in a diary. Then close it, and go to bed.    Make sure the room is not too hot or too cold. If it s not dark enough, an eye mask can help. If it s noisy, try using earplugs.    Don't eat a large meal just before bedtime.    Remove noises, bright lights, TVs, cell phones, and computers from your sleeping environment.    Use a comfortable mattress and pillow.  Learn to relax  Stress, anxiety, and body tension may keep you awake at night. To unwind before bedtime, try a warm bath, meditation, or yoga. Also try the following:    Deep breathing. Sit or lie back in a chair. Take a slow, deep breath. Hold it for 5 counts. Then breathe out slowly through your mouth. Keep doing this until you feel relaxed.    Progressive muscle relaxation. Tense and then relax the muscles in your body as you breathe deeply. Start with your feet and work up your body to your neck and face.  Cognitive Behavioral Therapy (CBT)  CBT is the most effective treatment for long-term insomnia. It tries to address the underlying causes of your sleep problems, including your habits and how you think about sleep.  Individual Therapy  Fidel Cameron PsyD,   Insomnia   [facility]    [facility]: [phone]    [facility]: [phone]  [facility]  Visit [email or web address] or call [phone] to find a clinic close to you.  Suggested resources  The resources below may help you relax. This list is for information only. [facility] is not responsible for the quality of services or the actions of any person or  organization. There may be a fee to use some of these resources.  Insomnia treatment books  Jeny Mind by Soila Pena and Rosita Garsia (2013)  Overcoming Insomnia by Sergio Rabago and Soila Pena (2008)  Quiet Your Mind and Get to Sleep: Solutions to Insomnia for Those with Depression, Anxiety or Chronic Pain by Soila Pena and Rosita Garsia (2009)  No More Sleepless Nights by Asaf Valdivia and Maria L Almaraz (1996)  Say Jeny to Insomnia by Andrey Lezama (2009)  The Insomnia Workbook by Ayana Damico and Weston Reynolds (2009)  The Insomnia Answer by Ronak Sheikh and Lyle Pemberton (2006)  Stress management and relaxation books  The Relaxation and Stress Reduction Workbook by Trini Dobson, Julianna Lobo and Marshall Thakur (2008)  Stress Management Workbook: Techniques and Self-Assessment Procedures by Emily Salazar and Yony Coleman (1997)  A Mindfulness-Based Stress Reduction Workbook by Amor Stark and Sandra Anton (2010)  The Complete Stress Management Workbook by Cuong Lo, Trip Kruse and Piotr Brambila (1996)  Online programs  www.SHUTi.me (pronounced shut eye). There is a fee for this program.   www.sleepIO.com (pronounced sleep ee oh). There is a fee for this program.  Other online resources  Deep breathing and progressive muscle relaxation (PMR):    http://[email or web address]  Meditation:    www.fragrantheart.[email or web address]    www.[email or web address]-meditation-[email or web address]  Guided imagery:    http://[email or web address]    http://[email or web address]  (click on  Resource Library,  then choose  Meditation, Relaxation, Guided Imagery )  Apps for your mobile device:    Autogenic Training Progressive Muscle Relaxation by [employer]    Calm: Meditation and Sleep Stories by "[employer] Inc.    Insight Timer - Meditation Laury by [employer]  This is not a prescription and these resources are optional. You must pay for any costs when using these  resources. Please ask your insurance carrier if you can be reimbursed for these resources. If so, you are responsible for sending the needed details to your insurance carrier. These resources may also be tax deductible as medical expenses. Check with your .  Date Last Reviewed: 5/18/2018  Clinically reviewed by Fidel Cameron PsyD, LP, CBSM, Director of the Insomnia and Behavioral Sleep Health Program, [facility].  For informational purposes only. Not to replace the advice of your health care provider.  Copyright   2018 [facility]. All rights reserved.           Patient Education     What Is Insomnia?    Do you have trouble falling asleep? Do you wake up often during the night? Or maybe you wake up too early in the morning. You may be suffering from insomnia. Talk with your healthcare provider if it lasts longer than a few weeks and you feel tired most of the time.   What causes insomnia?  Some common causes of insomnia are:    Health problems. These may be things such as pain, depression, medicine side effects, or trouble breathing.    Circadian rhythm disorder. This is a shift in the body s normal 24-hour activity cycle.    Lifestyle factors. These can include a changing sleep schedule, lack of exercise, or too much caffeine, nicotine, or alcohol.    Sleep settings. This includes things such as a poor mattress, noise, or a room that s too hot or too cold.    Stress. You may be stressed about problems at work, money worries, or family events.  Talk with your healthcare provider  Describe your sleeping problems to your healthcare provider. Try to keep a daily sleep diary for a couple of weeks. Write down the time you go to bed, the time you wake up, and anything that seems to affect your sleep. Your healthcare provider can work with you to create a treatment plan. You may need to learn good sleeping habits and make some lifestyle changes. If you have any health problems, these  may need to be treated first.   Antoinette last reviewed this educational content on 9/1/2019 2000-2021 [employer]Well [employer]. All rights reserved. This information is not intended as a substitute for professional medical care. Always follow your healthcare professional's instructions.

## 2021-12-06 NOTE — PROGRESS NOTES
Rodger is a 31 year old who is being evaluated via a billable video visit.      How would you like to obtain your AVS? Gretchenhart  If the video visit is dropped, the invitation should be resent by: Text to cell phone: [phone]  Will anyone else be joining your video visit? No      Video Start Time: 11:26 AM      Assessment & Plan       ICD-10-CM    1. Anxiety  F41.9 citalopram (CELEXA) 10 MG tablet     MENTAL HEALTH REFERRAL  - Adult; Outpatient Treatment; Individual/Couples/Family/Group Therapy/Health Psychology; [facility] [phone]; We will contact you to schedule the appointment or please call with any questions     propranolol (INDERAL) 10 MG tablet   2. Panic attack  F41.0 propranolol (INDERAL) 10 MG tablet        Mental health referral placed. Counseling coverage is very insurance driven. An intake person will call pt , but they  can narrow down possible choices by going to [facility].  In the search field type in therapists or counselors in the the [place] metro area and a bunch of therapists choices will come up with their pictures, backrounds, education and their areas of expertise.     Return in 5 weeks (on 1/10/2022) for anxiety follow up, with Dr. Acuña, as a video visit through [email or web address]. at 10:40am as scheduled.      Prescription drug management         MEDICATIONS:   Orders Placed This Encounter   Medications     citalopram (CELEXA) 10 MG tablet     Sig: Take 1 tablet (10 mg) by mouth daily For 2 weeks, then increase to 2 tabs daily if not at desired effect     Dispense:  60 tablet     Refill:  1     propranolol (INDERAL) 10 MG tablet     Sig: Take 1 tablet (10 mg) by mouth 3 times daily as needed (for anxiety/panic attacks)     Dispense:  20 tablet     Refill:  1          - Continue other medications without change  CONSULTATION/REFERRAL to counseling/mental health therapy   Regular exercise  Positive `Mantra re: falling asleep and staying asleep.   See Patient  Instructions    Return in 5 weeks (on 1/10/2022) for anxiety follow up, with Dr. Acuña, as a video visit through [employer].          Adriana Acuña MD  [facility]    Subjective :   Rodger is a 31 year old who presents for the following health issues :   1. Anxiety    2. Panic attack         History of Present Illness       Mental Health Follow-up:  Patient presents to follow-up on Anxiety.    Patient's anxiety since last visit has been:  Medium  The patient is having other symptoms associated with anxiety.  Any significant life events: No  Patient is not feeling anxious or having panic attacks.  Patient has no concerns about alcohol or drug use.     Social History  Tobacco Use    Smoking status: Never Smoker    Smokeless tobacco: Never Used  Vaping Use    Vaping Use: Never used  Alcohol use: Not Currently  Drug use: No      Today's PHQ-9         PHQ-9 Total Score:     (P) 5   PHQ-9 Q9 Thoughts of better off dead/self-harm past 2 weeks :   (P) Not at all   Thoughts of suicide or self harm:      Self-harm Plan:        Self-harm Action:          Safety concerns for self or others:           She eats 2-3 servings of fruits and vegetables daily.She consumes 0 sweetened beverage(s) daily.She exercises with enough effort to increase her heart rate 20 to 29 minutes per day.  She exercises with enough effort to increase her heart rate 3 or less days per week.   She is taking medications regularly.     GOOD-7 SCORE 11/15/2018 3/19/2021 12/2/2021   Total Score - - 5 (mild anxiety)   Total Score 3 2 5         Abnormal Mood Symptoms: has been having a lot of nightmares ( the last few months) and anxiety dreams.  Has noticed there are weeks that she awakens in the middle of the night and can't get back to sleep = really tired the next day and feels really fatigued.    Chronic anxiety  Her whole life.     Onset:     Description:   Depression: no   Anxiety: YES    Accompanying Signs &  Symptoms:  Still participating in activities that you used to enjoy: YES  Fatigue: YES  Irritability: yes  Difficulty concentrating: YES  Changes in appetite: no   Problems with sleep: YES  Heart racing/beating fast : YES  Thoughts of hurting yourself or others: none    History:   Recent stress: YES  Prior depression hospitalization: None  Family history of depression: YES  Family history of anxiety: YES    Precipitating factors:   Alcohol/drug use: no     Alleviating factors:  None    Having some panic attacks and has had chronic anxiety her whole life.   Mom and sister are on psych meds for depression.     Therapies Tried and outcome: 10 years ago was on a medication for anxiety when pt was in college for about 6 months.  Does not recall what med ?  Sertraline?  ? Wellbutrin? Stopped it as she was feeing fine after several months and didn't need it anymore. Was on a sleep aid then too.  Doesn't want to do a sleep aid again.      Tried melatonin for sleep and it can kick in within 20-40 minutes - sometimes takes 1-2 tablets.  When she awakens in the middle of the night, she'll awaken from a vivid dream and if its a nightmare, she will go on her phone and try to get it out of her head.       No longer breastfeeding.  Wants to try for another child in the spring.  Has Mirena IUD in place right now.      Social History     Tobacco Use     Smoking status: Never Smoker     Smokeless tobacco: Never Used   Vaping Use     Vaping Use: Never used   Substance Use Topics     Alcohol use: Not Currently     Drug use: No     GOOD-7 SCORE 11/15/2018 3/19/2021 12/2/2021   Total Score - - 5 (mild anxiety)   Total Score 3 2 5     PHQ 11/15/2018 3/19/2021 12/6/2021   PHQ-9 Total Score 0 5 5   Q9: Thoughts of better off dead/self-harm past 2 weeks Not at all Not at all Not at all     GOOD-7  12/2/2021   1. Feeling nervous, anxious, or on edge 1   2. Not being able to stop or control worrying 1   3. Worrying too much about different  things 1   4. Trouble relaxing 0   5. Being so restless that it is hard to sit still 0   6. Becoming easily annoyed or irritable 2   7. Feeling afraid, as if something awful might happen 0   GOOD-7 Total Score 5   If you checked any problems, how difficult have they made it for you to do your work, take care of things at home, or get along with other people? -       Last PHQ-9 12/6/2021   1.  Little interest or pleasure in doing things 0   2.  Feeling down, depressed, or hopeless 0   3.  Trouble falling or staying asleep, or sleeping too much 2   4.  Feeling tired or having little energy 2   5.  Poor appetite or overeating 0   6.  Feeling bad about yourself 0   7.  Trouble concentrating 1   8.  Moving slowly or restless 0   Q9: Thoughts of better off dead/self-harm past 2 weeks 0   PHQ-9 Total Score 5   Difficulty at work, home, or with people -        Patient Active Problem List   Diagnosis     Anxiety - more social anxiety      Abdominal bloating     IUD (intrauterine device) in place - Mirena IUD placed 10/13/2020 at 8 wks postpartum - replace in 2025      Migraine with aura and without status migrainosus, not intractable- can't do estrogen/combination ocp's      Altitude sickness preventative measures     PIH (pregnancy induced hypertension)     Indication for care in labor or delivery     Vaginal delivery     Insomnia secondary to anxiety     Attention and concentration deficit- no previous dx of ADHD - would like to do testing        Current Outpatient Medications   Medication Sig Dispense Refill     citalopram (CELEXA) 10 MG tablet Take 1 tablet (10 mg) by mouth daily For 2 weeks, then increase to 2 tabs daily if not at desired effect 60 tablet 1     propranolol (INDERAL) 10 MG tablet Take 1 tablet (10 mg) by mouth 3 times daily as needed (for anxiety/panic attacks) 20 tablet 1     SUMAtriptan (IMITREX) 50 MG tablet Take 1 tab @ onset of migraine, may repeat x 1-2 tabs in 2 hours 10 tablet 5     doxycycline  monohydrate (MONODOX) 100 MG capsule Take 1 capsule (100 mg) by mouth 2 times daily 14 capsule 0     ferrous sulfate (FE TABS) 325 (65 Fe) MG EC tablet Take 1 tablet (325 mg) by mouth daily (Patient not taking: Reported on 3/19/2021) 20 tablet 0     fluticasone (FLONASE) 50 MCG/ACT nasal spray Spray 1-2 sprays into both nostrils daily (Patient not taking: Reported on 11/9/2021) 16 g 0     ibuprofen (ADVIL/MOTRIN) 800 MG tablet Take 1 tablet (800 mg) by mouth every 8 hours as needed for moderate pain (Patient not taking: Reported on 3/19/2021) 30 tablet 0        No Known Allergies         Review of Systems   Constitutional, HEENT, cardiovascular, pulmonary, GI, , musculoskeletal, neuro, skin, endocrine and psych systems are negative, except as otherwise noted.      Objective           Vitals:  No vitals were obtained today due to virtual visit.    Physical Exam   GENERAL: Healthy, alert and no distress  EYES: Eyes grossly normal to inspection.  No discharge or erythema, or obvious scleral/conjunctival abnormalities.  RESP: No audible wheeze, cough, or visible cyanosis.  No visible retractions or increased work of breathing.    SKIN: Visible skin clear. No significant rash, abnormal pigmentation or lesions.  NEURO: Cranial nerves grossly intact.  Mentation and speech appropriate for age.  PSYCH: Mentation appears normal, affect normal/bright, judgement and insight intact, normal speech and appearance well-groomed.    Office Visit on 05/25/2021   Component Date Value Ref Range Status     COVID-19 Virus PCR to [facility] - So* 05/25/2021 Nasopharyngeal   Final     COVID-19 Virus PCR to [facility] - Re* 05/25/2021 Test received-See reflex to IDDL test SARS CoV2 (COVID-19) Virus RT-PCR   Final     SARS-CoV-2 Virus Specimen Source 05/25/2021 Nasopharyngeal   Final     SARS-CoV-2 PCR Result 05/25/2021 NEGATIVE   Final    SARS-CoV2 (COVID-19) RNA not detected, presumed negative.     SARS-CoV-2 PCR Comment 05/25/2021    Final                     Value:Testing was performed using the Xpert Xpress SARS-CoV-2 Assay on the Cepheid Gene-Xpert   Instrument Systems. Additional information about this Emergency Use Authorization (EUA)   assay can be found via the Lab Guide.      Comment: This test should be ordered for the detection of SARS-CoV-2 in individuals who   meet SARS-CoV-2 clinical and/or epidemiological criteria. Test performance is   unknown in asymptomatic patients.  This test is for in vitro diagnostic use under the FDA EUA for laboratories   certified under CLIA to perform high complexity testing. This test has not   been FDA cleared or approved.  A negative result does not rule out the presence of PCR inhibitors in the   specimen or target RNA in concentration below the limit of detection for the   assay. The possibility of a false negative should be considered if the   patient's recent exposure or clinical presentation suggests COVID-19.  This test was validated by the [facility] Infectious Diseases   Diagnostic Laboratory. This laboratory is certified under the Clinical   Laboratory Improvement Amendments of 1988 (CLIA-88) as qualified to perform   high complexity laboratory testing.                   Video-Visit Details    Type of service:  Video Visit    Video End Time:11:55 AM    Originating Location (pt. Location): Home    Distant Location (provider location):  [facility]     Platform used for Video Visit: [employer]  Answers for HPI/ROS submitted by the patient on 12/6/2021  If you checked off any problems, how difficult have these problems made it for you to do your work, take care of things at home, or get along with other people?: Somewhat difficult  PHQ9 TOTAL SCORE: 5  GOOD 7 TOTAL SCORE: 5

## 2021-12-07 ASSESSMENT — PATIENT HEALTH QUESTIONNAIRE - PHQ9: SUM OF ALL RESPONSES TO PHQ QUESTIONS 1-9: 5

## 2021-12-08 ENCOUNTER — TELEPHONE (OUTPATIENT)
Dept: FAMILY MEDICINE | Facility: CLINIC | Age: 31
End: 2021-12-08
Payer: COMMERCIAL

## 2021-12-08 NOTE — TELEPHONE ENCOUNTER
S-(situation): Pt calling with concerns of medication reaction    B-(background): Pt reports new medication start, Citalopram. Pt reports a migraine started shortly after, about 40 minutes.     A-(assessment): Pt noted she has hx of migraines. Pt stated she has migraines about 2-3 times per year. Per micromedex:          Common    Dermatologic: Diaphoresis (5% to 18% )    Gastrointestinal: Constipation (13% ), Diarrhea (8% ), Nausea (20% to 21% ), Vomiting (4% to 20% ), Xerostomia (17% to 20% )    Neurologic: Dizziness (14% ), Headache (18% ), Insomnia (15% ), Sedated (15% ), Somnolence (18% ), Tremor (8% to 16% )    Psychiatric: Agitation (3% to 10% )    Reproductive: Disorder of ejaculation (6.1% )    Other: Fatigue (5% )      R-(recommendations): Pt advised to stop medication, will discuss with PCP alternative medication. Pt requested pharmacy of [facility].     Crow ROMERO RN   [facility]

## 2021-12-09 NOTE — TELEPHONE ENCOUNTER
Message handled by Nurse Triage with [employer] - provider name: KEVIN; ok to trial one more time, if again ESCOBEDO caused will trial alternative medication     Called # [phone]     Pt called, discussed medication trial one more time. Pt noted will try on weekend possibly. Writer advised may trial in Evening after children have gone down. Patient stated an understanding and agreed with plan.  Pt stated will send message if happens again. Awaiting further details.        Crow Olson RN   [facility] Triage  .

## 2021-12-19 ENCOUNTER — MYC MEDICAL ADVICE (OUTPATIENT)
Dept: FAMILY MEDICINE | Facility: CLINIC | Age: 31
End: 2021-12-19
Payer: COMMERCIAL

## 2021-12-21 DIAGNOSIS — G43.109 MIGRAINE WITH AURA AND WITHOUT STATUS MIGRAINOSUS, NOT INTRACTABLE: ICD-10-CM

## 2021-12-21 NOTE — TELEPHONE ENCOUNTER
Routing to provider to review and advise. Citalopram prescribed on 12/6.     Separate encounter started for sumatriptan refill.     Mari Bennett RN  [facility]

## 2021-12-23 RX ORDER — SUMATRIPTAN 50 MG/1
TABLET, FILM COATED ORAL
Qty: 10 TABLET | Refills: 5 | Status: SHIPPED | OUTPATIENT
Start: 2021-12-23 | End: 2023-02-12

## 2021-12-23 NOTE — TELEPHONE ENCOUNTER
Pt called back, noted she has retried medications. Pt noted has been on medication for several days without return of migraine. Pt noted will continue to take medication and follow-up at next visit.   Future Appointments   Date Time Provider Department Center   1/10/2022 10:40 AM Adriana Acuña MD [facility]       Crow ROMERO RN   [facility] Triage

## 2021-12-23 NOTE — TELEPHONE ENCOUNTER
Attempt # 1  Called # [phone]     Called to check if able to retry medication Citalopram, checking if so did cause headache again?    Left a non detailed VM to call back at [phone] and ask for any available Triage Nurse.    Crow Olson RN   [facility] Triage

## 2021-12-23 NOTE — TELEPHONE ENCOUNTER
Prescription approved per [facility] Refill Protocol.    Reviewed: Last ordered a year ago

## 2022-01-17 ENCOUNTER — VIRTUAL VISIT (OUTPATIENT)
Dept: FAMILY MEDICINE | Facility: CLINIC | Age: 32
End: 2022-01-17
Payer: COMMERCIAL

## 2022-01-17 DIAGNOSIS — F51.05 INSOMNIA SECONDARY TO ANXIETY: ICD-10-CM

## 2022-01-17 DIAGNOSIS — F41.9 INSOMNIA SECONDARY TO ANXIETY: ICD-10-CM

## 2022-01-17 DIAGNOSIS — F41.9 ANXIETY: Primary | ICD-10-CM

## 2022-01-17 PROCEDURE — 99213 OFFICE O/P EST LOW 20 MIN: CPT | Mod: GT | Performed by: FAMILY MEDICINE

## 2022-01-17 RX ORDER — CITALOPRAM HYDROBROMIDE 10 MG/1
20 TABLET ORAL DAILY
Qty: 60 TABLET | Refills: 3 | Status: SHIPPED | OUTPATIENT
Start: 2022-01-17 | End: 2022-02-22

## 2022-01-17 ASSESSMENT — ANXIETY QUESTIONNAIRES
7. FEELING AFRAID AS IF SOMETHING AWFUL MIGHT HAPPEN: NOT AT ALL
5. BEING SO RESTLESS THAT IT IS HARD TO SIT STILL: NOT AT ALL
1. FEELING NERVOUS, ANXIOUS, OR ON EDGE: NOT AT ALL
3. WORRYING TOO MUCH ABOUT DIFFERENT THINGS: NOT AT ALL
2. NOT BEING ABLE TO STOP OR CONTROL WORRYING: NOT AT ALL
GAD7 TOTAL SCORE: 1
IF YOU CHECKED OFF ANY PROBLEMS ON THIS QUESTIONNAIRE, HOW DIFFICULT HAVE THESE PROBLEMS MADE IT FOR YOU TO DO YOUR WORK, TAKE CARE OF THINGS AT HOME, OR GET ALONG WITH OTHER PEOPLE: SOMEWHAT DIFFICULT
6. BECOMING EASILY ANNOYED OR IRRITABLE: SEVERAL DAYS

## 2022-01-17 ASSESSMENT — PATIENT HEALTH QUESTIONNAIRE - PHQ9
SUM OF ALL RESPONSES TO PHQ QUESTIONS 1-9: 3
5. POOR APPETITE OR OVEREATING: NOT AT ALL

## 2022-01-17 NOTE — PROGRESS NOTES
Rodger is a 31 year old who is being evaluated via a billable video visit.      How would you like to obtain your AVS? MyChart  If the video visit is dropped, the invitation should be resent by: Text to cell phone: [phone]  Will anyone else be joining your video visit? No      Video Start Time: 8:49 AM    Assessment & Plan :       ICD-10-CM    1. Anxiety  F41.9 citalopram (CELEXA) 10 MG tablet   2. Insomnia secondary to anxiety  F41.9 citalopram (CELEXA) 10 MG tablet    F51.05         Increase your citalopram to 2-10mg tabs = 20mg daily.  Please, call our clinic,  if signs or symptoms worsen or fail to improve as anticipated.     Appointments in Next Year    Mar 14, 2022  7:00 AM  (Arrive by 6:45 AM)  PHYSICAL with Adriana Acuña MD  [facility] ([facility] ) [phone]         Ordering of each unique test  Prescription drug management  10 minutes spent on the date of the encounter doing chart review, history and exam, documentation and further activities per the note       MEDICATIONS:   Orders Placed This Encounter   Medications     citalopram (CELEXA) 10 MG tablet     Sig: Take 2 tablets (20 mg) by mouth daily     Dispense:  60 tablet     Refill:  3     hold rx until pt calls to fill, please.          - Continue other medications without change  Regular exercise  See Patient Instructions    Return in 8 weeks (on 3/14/2022) for Physical/Preventative Visit and anxiety follow up with Dr. Acuña, in person .           Adriana Acuña MD  [facility]          Subjective   Rodger is a 31 year old who presents for the following health issues : No diagnosis found.      HPI     Anxiety Follow-Up- feels like citalopram has helped in taking the edge off her anxiety.   She's just been taking 1 -10mg citalopram since 12/6/2021 - she forgot she could go up on the dose to 2 tabs daily.     How are you doing with your anxiety since  your last visit? Improved slightly.  Has not increased her dose yet to two tabs daily    Are you having other symptoms that might be associated with anxiety? No    Have you had a significant life event? No     Are you feeling depressed? No    Do you have any concerns with your use of alcohol or other drugs? No     Had 2 migraines right when she first started taking the citalopram , but none since then.  Has been sleeping better through the night.  Not awakening with ruminating thoughts any more.  Hasn't had to use the propranolol at all for anxiety or panic attacks.      Social History     Tobacco Use     Smoking status: Never Smoker     Smokeless tobacco: Never Used   Vaping Use     Vaping Use: Never used   Substance Use Topics     Alcohol use: Not Currently     Drug use: No     GOOD-7 SCORE 3/19/2021 12/2/2021 1/17/2022   Total Score - 5 (mild anxiety) -   Total Score 2 5 1     PHQ 3/19/2021 12/6/2021 1/17/2022   PHQ-9 Total Score 5 5 3   Q9: Thoughts of better off dead/self-harm past 2 weeks Not at all Not at all Not at all     Last PHQ-9 1/17/2022   1.  Little interest or pleasure in doing things 0   2.  Feeling down, depressed, or hopeless 0   3.  Trouble falling or staying asleep, or sleeping too much 1   4.  Feeling tired or having little energy 1   5.  Poor appetite or overeating 0   6.  Feeling bad about yourself 0   7.  Trouble concentrating 1   8.  Moving slowly or restless 0   Q9: Thoughts of better off dead/self-harm past 2 weeks 0   PHQ-9 Total Score 3   Difficulty at work, home, or with people Somewhat difficult     GOOD-7  1/17/2022   1. Feeling nervous, anxious, or on edge 0   2. Not being able to stop or control worrying 0   3. Worrying too much about different things 0   4. Trouble relaxing 0   5. Being so restless that it is hard to sit still 0   6. Becoming easily annoyed or irritable 1   7. Feeling afraid, as if something awful might happen 0   GOOD-7 Total Score 1   If you checked any problems,  how difficult have they made it for you to do your work, take care of things at home, or get along with other people? Somewhat difficult     Patient Active Problem List   Diagnosis     Anxiety - more social anxiety      Abdominal bloating     IUD (intrauterine device) in place - Mirena IUD placed 10/13/2020 at 8 wks postpartum - replace in 2025      Migraine with aura and without status migrainosus, not intractable- can't do estrogen/combination ocp's      Altitude sickness preventative measures     PIH (pregnancy induced hypertension)     Indication for care in labor or delivery     Vaginal delivery     Insomnia secondary to anxiety     Attention and concentration deficit- no previous dx of ADHD - would like to do testing        Current Outpatient Medications   Medication Sig Dispense Refill     citalopram (CELEXA) 10 MG tablet Take 2 tablets (20 mg) by mouth daily 60 tablet 3     propranolol (INDERAL) 10 MG tablet Take 1 tablet (10 mg) by mouth 3 times daily as needed (for anxiety/panic attacks) 20 tablet 1     SUMAtriptan (IMITREX) 50 MG tablet Take 1 tab @ onset of migraine, may repeat x 1-2 tabs in 2 hours 10 tablet 5        No Known Allergies         Review of Systems   Constitutional, HEENT, cardiovascular, pulmonary, GI, , musculoskeletal, neuro, skin, endocrine and psych systems are negative, except as otherwise noted.      Objective           Vitals:  No vitals were obtained today due to virtual visit.    Physical Exam   GENERAL: Healthy, alert and no distress  EYES: Eyes grossly normal to inspection.  No discharge or erythema, or obvious scleral/conjunctival abnormalities.  RESP: No audible wheeze, cough, or visible cyanosis.  No visible retractions or increased work of breathing.    SKIN: Visible skin clear. No significant rash, abnormal pigmentation or lesions.  NEURO: Cranial nerves grossly intact.  Mentation and speech appropriate for age.  PSYCH: Mentation appears normal, affect normal/bright,  judgement and insight intact, normal speech and appearance well-groomed.    Office Visit on 05/25/2021   Component Date Value Ref Range Status     COVID-19 Virus PCR to [facility] - So* 05/25/2021 Nasopharyngeal   Final     COVID-19 Virus PCR to [facility] - Re* 05/25/2021 Test received-See reflex to IDDL test SARS CoV2 (COVID-19) Virus RT-PCR   Final     SARS-CoV-2 Virus Specimen Source 05/25/2021 Nasopharyngeal   Final     SARS-CoV-2 PCR Result 05/25/2021 NEGATIVE   Final    SARS-CoV2 (COVID-19) RNA not detected, presumed negative.     SARS-CoV-2 PCR Comment 05/25/2021    Final                    Value:Testing was performed using the Xpert Xpress SARS-CoV-2 Assay on the Cepheid Gene-Xpert   Instrument Systems. Additional information about this Emergency Use Authorization (EUA)   assay can be found via the Lab Guide.      Comment: This test should be ordered for the detection of SARS-CoV-2 in individuals who   meet SARS-CoV-2 clinical and/or epidemiological criteria. Test performance is   unknown in asymptomatic patients.  This test is for in vitro diagnostic use under the FDA EUA for laboratories   certified under CLIA to perform high complexity testing. This test has not   been FDA cleared or approved.  A negative result does not rule out the presence of PCR inhibitors in the   specimen or target RNA in concentration below the limit of detection for the   assay. The possibility of a false negative should be considered if the   patient's recent exposure or clinical presentation suggests COVID-19.  This test was validated by the [facility] Infectious Diseases   Diagnostic Laboratory. This laboratory is certified under the Clinical   Laboratory Improvement Amendments of 1988 (CLIA-88) as qualified to perform   high complexity laboratory testing.                   Video-Visit Details    Type of service:  Video Visit    Video End Time:8:56 AM    Originating Location (pt. Location): Home    Distant Location (provider  location):  [facility]     Platform used for Video Visit: [employer]

## 2022-01-17 NOTE — PATIENT INSTRUCTIONS
[facility]  [address] [place] [address]  Office: [phone]   Fax:    [phone]     Return in 8 weeks (on 3/14/2022) for Physical/Preventative Visit and anxiety follow up with Dr. Acuña, in person .     Increase your citalopram to 2-10mg tabs = 20mg daily.  Please, call our clinic,  if signs or symptoms worsen or fail to improve as anticipated.     Appointments in Next Year    Jan 17, 2022  8:20 AM  (Arrive by 8:00 AM)  Provider Visit with Adriana Acuña MD  [facility] ([facility] ) [phone]   Mar 14, 2022  7:00 AM  (Arrive by 6:45 AM)  PHYSICAL with Adriana Acuña MD  [facility] ([facility] ) [phone]                 Thank you so much for doing a video visit with us today. And, again, thank you  for choosing our [facility].  Please contact us with any questions that you may have.   We appreciate the opportunity to serve you now and look forward to supporting your healthcare needs for a long time to come!    Our clinic for the foreseeable future and until further notice , will continue to offer virtual visits, including telephone visits, video visits,  and e-visits, especially during this period of COVID-19 coronavirus pandemic.  Please call to schedule another one if you need it!        Most Sincerely,     Adriana Acuña MD                                              To reach your [facility] care team after hours call:   [phone]    PLEASE NOTE OUR HOURS HAVE CHANGED secondary to COVID-19 coronavirus pandemic, as we are trying to minimize patient exposure to the virus,  which is now widespread in the [facility].  These hours may change with very little notice.  We apologize for any inconvenience.       Our current clinic hours are:         Monday- Thursday   7:00am - 6:00pm  in  person.      Friday  7:00am- 5:00pm in person                       Saturday and Sunday : Closed to in person and virtual visits            We have telephone and virtual visit times available between 7:00am - 6pm on             Monday-Friday as well.                                                Phone:  [phone]    Our pharmacy hours: Monday  through Friday 8:00am to 5:00pm                        Saturday - 9:00 am to 12 noon         Sunday : Closed.     ###  Please note: at this time we are not accepting any walk-in visits. ###      There is also information available at our web site:  [email or web address]    If your provider ordered any lab tests and you do not receive the results within 10 business days, please call the clinic.    If you need a medication refill please contact your pharmacy.  Please allow 3 business days for your refill to be completed.    Our clinic offers telephone visits and e visits.  Please ask one of your team members to explain more.      Use [employer] (secure email communication and access to your chart) to send your primary care provider a message or make an appointment. Ask someone on your Team how to sign up for [employer].     Pharmacy is drive-thru only at this time secondary to the COVID-19 coronavirus pandemic, as we are trying to minimize patient exposure to the virus,  which is now widespread in the state.

## 2022-01-18 ASSESSMENT — ANXIETY QUESTIONNAIRES: GAD7 TOTAL SCORE: 1

## 2022-03-09 ENCOUNTER — OFFICE VISIT (OUTPATIENT)
Dept: FAMILY MEDICINE | Facility: CLINIC | Age: 32
End: 2022-03-09
Payer: COMMERCIAL

## 2022-03-09 VITALS
BODY MASS INDEX: 22.66 KG/M2 | WEIGHT: 141 LBS | DIASTOLIC BLOOD PRESSURE: 62 MMHG | OXYGEN SATURATION: 98 % | RESPIRATION RATE: 12 BRPM | HEIGHT: 66 IN | TEMPERATURE: 98.3 F | SYSTOLIC BLOOD PRESSURE: 106 MMHG | HEART RATE: 86 BPM

## 2022-03-09 DIAGNOSIS — M54.2 NECK PAIN ON LEFT SIDE: Primary | ICD-10-CM

## 2022-03-09 LAB
BASOPHILS # BLD AUTO: 0.1 10E3/UL (ref 0–0.2)
BASOPHILS NFR BLD AUTO: 1 %
CRP SERPL-MCNC: <2.9 MG/L (ref 0–8)
EOSINOPHIL # BLD AUTO: 0.1 10E3/UL (ref 0–0.7)
EOSINOPHIL NFR BLD AUTO: 1 %
ERYTHROCYTE [DISTWIDTH] IN BLOOD BY AUTOMATED COUNT: 12.9 % (ref 10–15)
ERYTHROCYTE [SEDIMENTATION RATE] IN BLOOD BY WESTERGREN METHOD: 14 MM/HR (ref 0–20)
HCT VFR BLD AUTO: 38.7 % (ref 35–47)
HGB BLD-MCNC: 13 G/DL (ref 11.7–15.7)
IMM GRANULOCYTES # BLD: 0 10E3/UL
IMM GRANULOCYTES NFR BLD: 0 %
LYMPHOCYTES # BLD AUTO: 2.2 10E3/UL (ref 0.8–5.3)
LYMPHOCYTES NFR BLD AUTO: 27 %
MCH RBC QN AUTO: 28.2 PG (ref 26.5–33)
MCHC RBC AUTO-ENTMCNC: 33.6 G/DL (ref 31.5–36.5)
MCV RBC AUTO: 84 FL (ref 78–100)
MONOCYTES # BLD AUTO: 0.6 10E3/UL (ref 0–1.3)
MONOCYTES NFR BLD AUTO: 7 %
NEUTROPHILS # BLD AUTO: 5.4 10E3/UL (ref 1.6–8.3)
NEUTROPHILS NFR BLD AUTO: 64 %
PLATELET # BLD AUTO: 307 10E3/UL (ref 150–450)
RBC # BLD AUTO: 4.61 10E6/UL (ref 3.8–5.2)
WBC # BLD AUTO: 8.3 10E3/UL (ref 4–11)

## 2022-03-09 PROCEDURE — 86140 C-REACTIVE PROTEIN: CPT | Performed by: FAMILY MEDICINE

## 2022-03-09 PROCEDURE — 85025 COMPLETE CBC W/AUTO DIFF WBC: CPT | Performed by: FAMILY MEDICINE

## 2022-03-09 PROCEDURE — 99213 OFFICE O/P EST LOW 20 MIN: CPT | Performed by: FAMILY MEDICINE

## 2022-03-09 PROCEDURE — 85652 RBC SED RATE AUTOMATED: CPT | Performed by: FAMILY MEDICINE

## 2022-03-09 PROCEDURE — 36415 COLL VENOUS BLD VENIPUNCTURE: CPT | Performed by: FAMILY MEDICINE

## 2022-03-09 ASSESSMENT — PAIN SCALES - GENERAL: PAINLEVEL: SEVERE PAIN (6)

## 2022-03-09 NOTE — PROGRESS NOTES
"  Assessment & Plan     Neck pain on left side: unclear etiology for symptoms. No palpable lymphadenopathy or other mass or swelling. Will check CBC/inflammatory markers. If all normal and pain not improving over the next few days, consider ultrasound to evaluate for cause.  - CBC with platelets and differential; Future  - CRP, inflammation; Future  - ESR: Erythrocyte sedimentation rate; Future  - CBC with platelets and differential  - CRP, inflammation  - ESR: Erythrocyte sedimentation rate        Return in about 1 week (around 3/16/2022) for follow up if symptoms not improving.    Prakash Zuluaga [facility] PRIOR NIKOLAY Soares is a 31 year old who presents for the following health issues     History of Present Illness       Reason for visit:  Painful lymph node  Symptom onset:  1-3 days ago  Symptoms include:  Pain when swallowing and in left ear worse at night  Symptom intensity:  Moderate  Symptom progression:  Worsening  Had these symptoms before:  No  What makes it better:  Ibuprofen    She eats 2-3 servings of fruits and vegetables daily.She consumes 0 sweetened beverage(s) daily.She exercises with enough effort to increase her heart rate 30 to 60 minutes per day.  She exercises with enough effort to increase her heart rate 3 or less days per week.   She is taking medications regularly.    She states the pain starts around 8 PM. She does grind her teeth but uses a retainer to help with this. She states she feels the pain on the left side of her neck and towards ear when swallowing but doesn't feel like a sore throat. The pain seems to be getting worse. No fevers, chills, cough, runny nose, congestion, body aches. No dysphagia.          Review of Systems   Constitutional, HEENT, cardiovascular, pulmonary, gi and gu systems are negative, except as otherwise noted.      Objective    /62   Pulse 86   Temp 98.3  F (36.8  C) (Tympanic)   Resp 12   Ht 1.676 m (5' 6\")   Wt " 64 kg (141 lb)   LMP 02/26/2022 (Approximate)   SpO2 98%   BMI 22.76 kg/m    Body mass index is 22.76 kg/m .  Physical Exam   GENERAL: healthy, alert and no distress  HENT: ear canals and TM's normal, nose and mouth without ulcers or lesions  NECK: no adenopathy, no asymmetry, masses, or scars and thyroid normal to palpation

## 2022-03-24 DIAGNOSIS — F41.9 ANXIETY: ICD-10-CM

## 2022-03-24 DIAGNOSIS — F51.05 INSOMNIA SECONDARY TO ANXIETY: ICD-10-CM

## 2022-03-24 DIAGNOSIS — F41.9 INSOMNIA SECONDARY TO ANXIETY: ICD-10-CM

## 2022-03-28 RX ORDER — CITALOPRAM HYDROBROMIDE 10 MG/1
TABLET ORAL
Qty: 60 TABLET | Refills: 11 | Status: SHIPPED | OUTPATIENT
Start: 2022-03-28 | End: 2022-05-05

## 2022-03-28 NOTE — TELEPHONE ENCOUNTER
TC-Please call patient to schedule an appointment-see below.Thank you!  01/17/2022 Adriana Acuña MD Virtual Visit  Return in 8 weeks (on 3/14/2022) for Physical/Preventative Visit and anxiety follow up with Dr. Acuña, in person .         Refill approved per [facility] refill protocol/Neymar ARREOLA RN   [facility] Triage

## 2022-03-31 ENCOUNTER — MYC MEDICAL ADVICE (OUTPATIENT)
Dept: FAMILY MEDICINE | Facility: CLINIC | Age: 32
End: 2022-03-31
Payer: COMMERCIAL

## 2022-05-05 ENCOUNTER — VIRTUAL VISIT (OUTPATIENT)
Dept: FAMILY MEDICINE | Facility: CLINIC | Age: 32
End: 2022-05-05
Payer: COMMERCIAL

## 2022-05-05 DIAGNOSIS — F41.9 INSOMNIA SECONDARY TO ANXIETY: ICD-10-CM

## 2022-05-05 DIAGNOSIS — F41.9 ANXIETY: ICD-10-CM

## 2022-05-05 DIAGNOSIS — G43.109 MIGRAINE WITH AURA AND WITHOUT STATUS MIGRAINOSUS, NOT INTRACTABLE: ICD-10-CM

## 2022-05-05 DIAGNOSIS — F51.05 INSOMNIA SECONDARY TO ANXIETY: ICD-10-CM

## 2022-05-05 DIAGNOSIS — F41.9 ANXIETY: Primary | ICD-10-CM

## 2022-05-05 PROCEDURE — 99214 OFFICE O/P EST MOD 30 MIN: CPT | Mod: GT | Performed by: FAMILY MEDICINE

## 2022-05-05 RX ORDER — CITALOPRAM HYDROBROMIDE 10 MG/1
20 TABLET ORAL DAILY
Qty: 180 TABLET | Refills: 1 | Status: SHIPPED | OUTPATIENT
Start: 2022-05-05 | End: 2022-10-06

## 2022-05-05 RX ORDER — OXYCODONE HYDROCHLORIDE 5 MG/1
5 TABLET ORAL EVERY 6 HOURS PRN
Qty: 5 TABLET | Refills: 0 | Status: SHIPPED | OUTPATIENT
Start: 2022-05-05 | End: 2022-05-08

## 2022-05-05 ASSESSMENT — ANXIETY QUESTIONNAIRES
GAD7 TOTAL SCORE: 0
6. BECOMING EASILY ANNOYED OR IRRITABLE: NOT AT ALL
2. NOT BEING ABLE TO STOP OR CONTROL WORRYING: NOT AT ALL
1. FEELING NERVOUS, ANXIOUS, OR ON EDGE: NOT AT ALL
7. FEELING AFRAID AS IF SOMETHING AWFUL MIGHT HAPPEN: NOT AT ALL
3. WORRYING TOO MUCH ABOUT DIFFERENT THINGS: NOT AT ALL
5. BEING SO RESTLESS THAT IT IS HARD TO SIT STILL: NOT AT ALL

## 2022-05-05 ASSESSMENT — PATIENT HEALTH QUESTIONNAIRE - PHQ9: 5. POOR APPETITE OR OVEREATING: NOT AT ALL

## 2022-05-05 NOTE — PROGRESS NOTES
Rodger is a 31 year old who is being evaluated via a billable video visit via [employer].     How would you like to obtain your AVS? MyChart  If the video visit is dropped, the invitation should be resent by: Text to cell phone: [phone]  Will anyone else be joining your video visit? No      Video Start Time: 5:05 PM    Assessment & Plan       ICD-10-CM    1. Anxiety - more social anxiety   F41.9    2. Insomnia secondary to anxiety  F41.9 citalopram (CELEXA) 10 MG tablet    F51.05    3. Anxiety  F41.9 citalopram (CELEXA) 10 MG tablet   4. Migraine with aura and without status migrainosus, not intractable- can't do estrogen/combination ocp's   G43.109 oxyCODONE (ROXICODONE) 5 MG tablet         Ordering of each unique test  Prescription drug management  18 minutes spent on the date of the encounter doing chart review, history and exam, documentation and further activities per the note       MEDICATIONS:  Continue current medications without change  Work on weight loss  Regular exercise  See Patient Instructions    Had IUD removed in 3/2022 with ob/gyn specialists in [place]. Had a pap donewith them on 3/2022. if she gets pregnant , she can't take imitrex during pregnancy and we discussed that today in detail.     Return in about 6 months (around 11/5/2022) for depression/anxiety follow up, in person, w/ Dr. GILL .         Adriana Acuña MD  [facility]   Rodger is a 31 year old who presents for the following health issues:   1. 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 Anxiety    4. Migraine with aura and without status migrainosus, not intractable- can't do estrogen/combination ocp's              HPI 21 month old daughter has pneumonia.     Anxiety Follow-Up:      How are you doing with your anxiety since your last visit?  Improved     Are you having other symptoms that might be associated with anxiety? No    Have you had a significant  life event? No     Are you feeling depressed? No    Do you have any concerns with your use of alcohol or other drugs? No     On 2-10mg citalopram daily.  Sleeping much better.  No more anxiety or panic attacks.  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  No sexual side effects at all. She likes the 2-10mg tabs daily as they are really small.      Social History     Tobacco Use     Smoking status: Never Smoker     Smokeless tobacco: Never Used   Vaping Use     Vaping Use: Never used   Substance Use Topics     Alcohol use: Not Currently     Drug use: No     GOOD-7 SCORE 3/19/2021 12/2/2021 1/17/2022   Total Score - 5 (mild anxiety) -   Total Score 2 5 1     PHQ 3/19/2021 12/6/2021 1/17/2022   PHQ-9 Total Score 5 5 3   Q9: Thoughts of better off dead/self-harm past 2 weeks Not at all Not at all Not at all     Last PHQ-9 1/17/2022   1.  Little interest or pleasure in doing things 0   2.  Feeling down, depressed, or hopeless 0   3.  Trouble falling or staying asleep, or sleeping too much 1   4.  Feeling tired or having little energy 1   5.  Poor appetite or overeating 0   6.  Feeling bad about yourself 0   7.  Trouble concentrating 1   8.  Moving slowly or restless 0   Q9: Thoughts of better off dead/self-harm past 2 weeks 0   PHQ-9 Total Score 3   Difficulty at work, home, or with people Somewhat difficult     GOOD-7  1/17/2022   1. Feeling nervous, anxious, or on edge 0   2. Not being able to stop or control worrying 0   3. Worrying too much about different things 0   4. Trouble relaxing 0   5. Being so restless that it is hard to sit still 0   6. Becoming easily annoyed or irritable 1   7. Feeling afraid, as if something awful might happen 0   GOOD-7 Total Score 1   If you checked any problems, how difficult have they made it for you to do your work, take care of things at home, or get along with other people? Somewhat difficult         Patient Active Problem List   Diagnosis     Anxiety - more social anxiety       Abdominal bloating     IUD (intrauterine device) in place - Mirena IUD placed 10/13/2020 at 8 wks postpartum - replace in 2025      Migraine with aura and without status migrainosus, not intractable- can't do estrogen/combination ocp's      Altitude sickness preventative measures     PIH (pregnancy induced hypertension)     Indication for care in labor or delivery     Vaginal delivery     Insomnia secondary to anxiety     Attention and concentration deficit- no previous dx of ADHD - would like to do testing        Current Outpatient Medications   Medication Sig Dispense Refill     citalopram (CELEXA) 10 MG tablet Take 2 tablets (20 mg) by mouth daily 180 tablet 1     propranolol (INDERAL) 10 MG tablet Take 1 tablet (10 mg) by mouth 3 times daily as needed (for anxiety/panic attacks) 20 tablet 1     SUMAtriptan (IMITREX) 50 MG tablet Take 1 tab @ onset of migraine, may repeat x 1-2 tabs in 2 hours 10 tablet 5        No Known Allergies         Review of Systems   Constitutional, HEENT, cardiovascular, pulmonary, GI, , musculoskeletal, neuro, skin, endocrine and psych systems are negative, except as otherwise noted.      Objective           Vitals:  No vitals were obtained today due to virtual visit.    Physical Exam   GENERAL: Healthy, alert and no distress  EYES: Eyes grossly normal to inspection.  No discharge or erythema, or obvious scleral/conjunctival abnormalities.  RESP: No audible wheeze, cough, or visible cyanosis.  No visible retractions or increased work of breathing.    SKIN: Visible skin clear. No significant rash, abnormal pigmentation or lesions.  NEURO: Cranial nerves grossly intact.  Mentation and speech appropriate for age.  PSYCH: Mentation appears normal, affect normal/bright, judgement and insight intact, normal speech and appearance well-groomed.    Office Visit on 03/09/2022   Component Date Value Ref Range Status     CRP Inflammation 03/09/2022 <2.9  0.0 - 8.0 mg/L Final     Erythrocyte  Sedimentation Rate 03/09/2022 14  0 - 20 mm/hr Final     WBC Count 03/09/2022 8.3  4.0 - 11.0 10e3/uL Final     RBC Count 03/09/2022 4.61  3.80 - 5.20 10e6/uL Final     Hemoglobin 03/09/2022 13.0  11.7 - 15.7 g/dL Final     Hematocrit 03/09/2022 38.7  35.0 - 47.0 % Final     MCV 03/09/2022 84  78 - 100 fL Final     MCH 03/09/2022 28.2  26.5 - 33.0 pg Final     MCHC 03/09/2022 33.6  31.5 - 36.5 g/dL Final     RDW 03/09/2022 12.9  10.0 - 15.0 % Final     Platelet Count 03/09/2022 307  150 - 450 10e3/uL Final     % Neutrophils 03/09/2022 64  % Final     % Lymphocytes 03/09/2022 27  % Final     % Monocytes 03/09/2022 7  % Final     % Eosinophils 03/09/2022 1  % Final     % Basophils 03/09/2022 1  % Final     % Immature Granulocytes 03/09/2022 0  % Final     Absolute Neutrophils 03/09/2022 5.4  1.6 - 8.3 10e3/uL Final     Absolute Lymphocytes 03/09/2022 2.2  0.8 - 5.3 10e3/uL Final     Absolute Monocytes 03/09/2022 0.6  0.0 - 1.3 10e3/uL Final     Absolute Eosinophils 03/09/2022 0.1  0.0 - 0.7 10e3/uL Final     Absolute Basophils 03/09/2022 0.1  0.0 - 0.2 10e3/uL Final     Absolute Immature Granulocytes 03/09/2022 0.0  <=0.4 10e3/uL Final               Video-Visit Details    Type of service:  Video Visit    Video End Time:5:22 PM    Originating Location (pt. Location): Home    Distant Location (provider location):  [facility]     [facility] for Video Visit: [facility]

## 2022-05-05 NOTE — LETTER
[facility]  [address] [address]  [phone]                  May 5, 2022    AUTHORIZATION TO RELEASE PROTECTED HEALTH INFORMATION    Patient Name:  Allison J Bazinet  YOB: 1990    Trev MRN:[id number]             This will authorize [facility] to request information from :     [facility] F [phone], P [phone]    The following information is to be released for health maintenance and continuing care purposes with my primary care clinic:                 Pap Smear Report(most recent only)       When: 3/2022     -I understand that I may revoke this authorization by written request at any time to the address listed at the top of this form.  I understand that the revocation will not apply to information that has already been released in response to this authorization.    -This authorization last for one year after the date you sign it.     -[facility] cannot prevent redisclosure of the information by the person or organization who receives your records under this authorization, and that information may not be covered by state and federal privacy protections after it is released. By signing this authorization, you release [facility] from any and all liability resulting from a redisclosure by the recipient.    ___________________________________          _____________  Signature of Patient/Authorized Person                     Date        ____________________________________________  (Reason if patient is unable to sign)

## 2022-05-06 ASSESSMENT — ANXIETY QUESTIONNAIRES: GAD7 TOTAL SCORE: 0

## 2022-05-21 ENCOUNTER — HEALTH MAINTENANCE LETTER (OUTPATIENT)
Age: 32
End: 2022-05-21

## 2022-08-02 LAB
HIV1+2 AB SERPL QL IA: NEGATIVE
RUBELLA ANTIBODY IGG (EXTERNAL): NORMAL
VDRL (SYPHILIS) (EXTERNAL): NONREACTIVE

## 2022-08-08 LAB — HEPATITIS B SURFACE ANTIGEN (EXTERNAL): NEGATIVE

## 2022-09-18 ENCOUNTER — HEALTH MAINTENANCE LETTER (OUTPATIENT)
Age: 32
End: 2022-09-18

## 2022-10-05 ENCOUNTER — MYC MEDICAL ADVICE (OUTPATIENT)
Dept: FAMILY MEDICINE | Facility: CLINIC | Age: 32
End: 2022-10-05

## 2023-01-09 ENCOUNTER — HOSPITAL ENCOUNTER (EMERGENCY)
Facility: CLINIC | Age: 33
Discharge: HOME | End: 2023-01-10
Attending: EMERGENCY MEDICINE | Admitting: EMERGENCY MEDICINE
Payer: COMMERCIAL

## 2023-01-09 DIAGNOSIS — G43.109 MIGRAINE WITH AURA AND WITHOUT STATUS MIGRAINOSUS, NOT INTRACTABLE: ICD-10-CM

## 2023-01-09 LAB
ALBUMIN SERPL BCG-MCNC: 3.6 G/DL (ref 3.5–5.2)
ALP SERPL-CCNC: 83 U/L (ref 35–104)
ALT SERPL W P-5'-P-CCNC: 10 U/L (ref 10–35)
ANION GAP SERPL CALCULATED.3IONS-SCNC: 14 MMOL/L (ref 7–15)
AST SERPL W P-5'-P-CCNC: 20 U/L (ref 10–35)
BASOPHILS # BLD AUTO: 0.1 10E3/UL (ref 0–0.2)
BASOPHILS NFR BLD AUTO: 0 %
BILIRUB SERPL-MCNC: <0.2 MG/DL
BUN SERPL-MCNC: 8.4 MG/DL (ref 6–20)
CALCIUM SERPL-MCNC: 9.1 MG/DL (ref 8.6–10)
CHLORIDE SERPL-SCNC: 101 MMOL/L (ref 98–107)
CREAT SERPL-MCNC: 0.63 MG/DL (ref 0.51–0.95)
DEPRECATED HCO3 PLAS-SCNC: 23 MMOL/L (ref 22–29)
EOSINOPHIL # BLD AUTO: 0.2 10E3/UL (ref 0–0.7)
EOSINOPHIL NFR BLD AUTO: 2 %
ERYTHROCYTE [DISTWIDTH] IN BLOOD BY AUTOMATED COUNT: 12.4 % (ref 10–15)
GFR SERPL CREATININE-BSD FRML MDRD: >90 ML/MIN/1.73M2
GLUCOSE SERPL-MCNC: 81 MG/DL (ref 70–99)
HCT VFR BLD AUTO: 34.6 % (ref 35–47)
HGB BLD-MCNC: 11.1 G/DL (ref 11.7–15.7)
HOLD SPECIMEN: NORMAL
IMM GRANULOCYTES # BLD: 0.2 10E3/UL
IMM GRANULOCYTES NFR BLD: 2 %
LYMPHOCYTES # BLD AUTO: 3.1 10E3/UL (ref 0.8–5.3)
LYMPHOCYTES NFR BLD AUTO: 25 %
MCH RBC QN AUTO: 29.1 PG (ref 26.5–33)
MCHC RBC AUTO-ENTMCNC: 32.1 G/DL (ref 31.5–36.5)
MCV RBC AUTO: 91 FL (ref 78–100)
MONOCYTES # BLD AUTO: 1 10E3/UL (ref 0–1.3)
MONOCYTES NFR BLD AUTO: 8 %
NEUTROPHILS # BLD AUTO: 8 10E3/UL (ref 1.6–8.3)
NEUTROPHILS NFR BLD AUTO: 63 %
NRBC # BLD AUTO: 0 10E3/UL
NRBC BLD AUTO-RTO: 0 /100
PLATELET # BLD AUTO: 264 10E3/UL (ref 150–450)
POTASSIUM SERPL-SCNC: 3.7 MMOL/L (ref 3.4–5.3)
PROT SERPL-MCNC: 6.9 G/DL (ref 6.4–8.3)
RBC # BLD AUTO: 3.82 10E6/UL (ref 3.8–5.2)
SODIUM SERPL-SCNC: 138 MMOL/L (ref 136–145)
WBC # BLD AUTO: 12.6 10E3/UL (ref 4–11)

## 2023-01-09 PROCEDURE — 258N000003 HC RX IP 258 OP 636: Performed by: EMERGENCY MEDICINE

## 2023-01-09 PROCEDURE — 96375 TX/PRO/DX INJ NEW DRUG ADDON: CPT

## 2023-01-09 PROCEDURE — 99284 EMERGENCY DEPT VISIT MOD MDM: CPT | Mod: 25

## 2023-01-09 PROCEDURE — 36415 COLL VENOUS BLD VENIPUNCTURE: CPT | Performed by: EMERGENCY MEDICINE

## 2023-01-09 PROCEDURE — 80053 COMPREHEN METABOLIC PANEL: CPT | Performed by: EMERGENCY MEDICINE

## 2023-01-09 PROCEDURE — 85004 AUTOMATED DIFF WBC COUNT: CPT | Performed by: EMERGENCY MEDICINE

## 2023-01-09 PROCEDURE — 250N000011 HC RX IP 250 OP 636: Performed by: EMERGENCY MEDICINE

## 2023-01-09 PROCEDURE — 96372 THER/PROPH/DIAG INJ SC/IM: CPT | Mod: 59 | Performed by: EMERGENCY MEDICINE

## 2023-01-09 PROCEDURE — 250N000009 HC RX 250: Performed by: EMERGENCY MEDICINE

## 2023-01-09 PROCEDURE — 96365 THER/PROPH/DIAG IV INF INIT: CPT

## 2023-01-09 PROCEDURE — 250N000013 HC RX MED GY IP 250 OP 250 PS 637: Performed by: EMERGENCY MEDICINE

## 2023-01-09 PROCEDURE — 250N000012 HC RX MED GY IP 250 OP 636 PS 637: Performed by: EMERGENCY MEDICINE

## 2023-01-09 RX ORDER — DIPHENHYDRAMINE HYDROCHLORIDE 50 MG/ML
25 INJECTION INTRAMUSCULAR; INTRAVENOUS ONCE
Status: COMPLETED | OUTPATIENT
Start: 2023-01-09 | End: 2023-01-09

## 2023-01-09 RX ORDER — LIDOCAINE HYDROCHLORIDE 40 MG/ML
1 INJECTION, SOLUTION RETROBULBAR ONCE
Status: COMPLETED | OUTPATIENT
Start: 2023-01-09 | End: 2023-01-09

## 2023-01-09 RX ORDER — SUMATRIPTAN 6 MG/.5ML
6 INJECTION, SOLUTION SUBCUTANEOUS ONCE
Status: COMPLETED | OUTPATIENT
Start: 2023-01-09 | End: 2023-01-09

## 2023-01-09 RX ORDER — ACETAMINOPHEN 325 MG/1
650 TABLET ORAL ONCE
Status: COMPLETED | OUTPATIENT
Start: 2023-01-09 | End: 2023-01-09

## 2023-01-09 RX ORDER — MAGNESIUM SULFATE HEPTAHYDRATE 40 MG/ML
2 INJECTION, SOLUTION INTRAVENOUS ONCE
Status: COMPLETED | OUTPATIENT
Start: 2023-01-09 | End: 2023-01-10

## 2023-01-09 RX ORDER — METOCLOPRAMIDE HYDROCHLORIDE 5 MG/ML
10 INJECTION INTRAMUSCULAR; INTRAVENOUS ONCE
Status: COMPLETED | OUTPATIENT
Start: 2023-01-09 | End: 2023-01-09

## 2023-01-09 RX ADMIN — METOCLOPRAMIDE HYDROCHLORIDE 10 MG: 5 INJECTION INTRAMUSCULAR; INTRAVENOUS at 20:59

## 2023-01-09 RX ADMIN — SUMATRIPTAN SUCCINATE 6 MG: 6 INJECTION SUBCUTANEOUS at 23:54

## 2023-01-09 RX ADMIN — ACETAMINOPHEN 650 MG: 325 TABLET, FILM COATED ORAL at 22:58

## 2023-01-09 RX ADMIN — SODIUM CHLORIDE 1000 ML: 9 INJECTION, SOLUTION INTRAVENOUS at 22:22

## 2023-01-09 RX ADMIN — DIPHENHYDRAMINE HYDROCHLORIDE 25 MG: 50 INJECTION INTRAMUSCULAR; INTRAVENOUS at 20:59

## 2023-01-09 RX ADMIN — MAGNESIUM SULFATE HEPTAHYDRATE 2 G: 40 INJECTION, SOLUTION INTRAVENOUS at 23:55

## 2023-01-09 RX ADMIN — LIDOCAINE HYDROCHLORIDE 1 ML: 40 INJECTION, SOLUTION RETROBULBAR; TOPICAL at 22:58

## 2023-01-09 ASSESSMENT — ACTIVITIES OF DAILY LIVING (ADL)
ADLS_ACUITY_SCORE: 33
ADLS_ACUITY_SCORE: 35

## 2023-01-10 VITALS
HEIGHT: 66 IN | WEIGHT: 163.58 LBS | HEART RATE: 103 BPM | OXYGEN SATURATION: 100 % | BODY MASS INDEX: 26.29 KG/M2 | TEMPERATURE: 98 F | SYSTOLIC BLOOD PRESSURE: 118 MMHG | RESPIRATION RATE: 18 BRPM | DIASTOLIC BLOOD PRESSURE: 69 MMHG

## 2023-01-10 NOTE — ED PROVIDER NOTES
[facility] Provider Note  [facility] Emergency Department  1:24 AM  1/10/2023    Audrey MALIK Maurydariusefe  32 year oldfemale    Chief Complaint   Patient presents with     Headache       HPI:    32-year-old female currently 34 weeks pregnant here with left-sided headache.  Feels typical of her previous migraines.  Left-sided described as throbbing in nature.  Preceded by a classic aura.  She has light and sound sensitivity as well as associated nausea.  This is not the worst headache of her life.  No associated fever, significant neck stiffness, extremity weakness, balance abnormalities.  She had 120-minute episode of paresthesias on the left hand yesterday.  No vision loss.  No abdominal pain vaginal bleeding dysuria frequency urgency or bowel abnormalities.  Tried home remedies including Tylenol as well as Imitrex.,    ROS: 10 point ROS completed and negative other than mentioned above    Past Medical History:   Diagnosis Date     History of chicken pox      IUD (intrauterine device) in place 02/01/2014    has appt for replacement 1/2019 with [place] [facility]       Migraine with aura and without status migrainosus, not intractable 11/15/2018     PIH (pregnancy induced hypertension) 8/13/2020     PNA (pneumonia) 05/2016    LLL     PPD screening test     Test was negative, she had exposure in [place]     Past Surgical History:   Procedure Laterality Date     LUMPECTOMY BREAST Right 2009    Benign, done in [place]     wisdom teeth extraction - all 4            Social History     Tobacco Use     Smoking status: Never     Smokeless tobacco: Never   Vaping Use     Vaping Use: Never used   Substance Use Topics     Alcohol use: Not Currently     Drug use: No         Current Outpatient Medications   Medication Instructions     citalopram (CELEXA) 10 MG tablet TAKE 2 TABLETS BY MOUTH  DAILY     propranolol (INDERAL) 10 mg, Oral, 3 TIMES DAILY PRN     SUMAtriptan (IMITREX) 50 MG tablet Take 1 tab @ onset of migraine, may repeat x 1-2 tabs in 2  "hours          No Known Allergies      Physical Exam  Vitals: /69   Pulse 103   Temp 98  F (36.7  C) (Oral)   Resp 18   Ht 1.676 m (5' 6\")   Wt 74.2 kg (163 lb 9.3 oz)   LMP 04/13/2022 (Approximate)   SpO2 100%   BMI 26.40 kg/m      HEENT:  mmm, no rhinorrhea, TMs clear, oropharynx without significant hypertrophy or exudates, pupils 4 mm reactive bilaterally, no nystagmus  Neck: supple, no abnormal swelling  Lungs:  CTAB,  no resp distress  CV: rrr, no m/r/g, ppi  Abd: soft, nontender, nondistended, no rebound/masses/guarding/hsm  Ext: no peripheral edema  Skin: warm, dry, well perfused, no rashes/bruising/lesions on exposed skin  Neuro: alert, cranial nerves II through XII intact and symmetric, 5 out of 5 motor bilateral upper and bilateral lower extremities.  Sensation tact light touch all extremities, coordination grossly intact, gait stable   psych: Normal mood, normal affect      Labs and Imaging:    Labs Ordered and Resulted from Time of ED Arrival to Time of ED Departure   CBC WITH PLATELETS AND DIFFERENTIAL - Abnormal       Result Value    WBC Count 12.6 (*)     RBC Count 3.82      Hemoglobin 11.1 (*)     Hematocrit 34.6 (*)     MCV 91      MCH 29.1      MCHC 32.1      RDW 12.4      Platelet Count 264      % Neutrophils 63      % Lymphocytes 25      % Monocytes 8      % Eosinophils 2      % Basophils 0      % Immature Granulocytes 2      NRBCs per 100 WBC 0      Absolute Neutrophils 8.0      Absolute Lymphocytes 3.1      Absolute Monocytes 1.0      Absolute Eosinophils 0.2      Absolute Basophils 0.1      Absolute Immature Granulocytes 0.2      Absolute NRBCs 0.0     COMPREHENSIVE METABOLIC PANEL - Normal    Sodium 138      Potassium 3.7      Chloride 101      Carbon Dioxide (CO2) 23      Anion Gap 14      Urea Nitrogen 8.4      Creatinine 0.63      Calcium 9.1      Glucose 81      Alkaline Phosphatase 83      AST 20      ALT 10      Protein Total 6.9      Albumin 3.6      Bilirubin Total <0.2  "     GFR Estimate >90            No orders to display                  ED Medications:   Medications   0.9% sodium chloride BOLUS (0 mLs Intravenous Stopped 23)   metoclopramide (REGLAN) injection 10 mg (10 mg Intravenous Given 23)   diphenhydrAMINE (BENADRYL) injection 25 mg (25 mg Intravenous Given 23)   acetaminophen (TYLENOL) tablet 650 mg (650 mg Oral Given 23)   lidocaine 4 % injection 1 mL (1 mL Intranasal Given 23)   magnesium sulfate 2 g in water intermittent infusion (0 g Intravenous Stopped 1/10/23 0043)   SUMAtriptan (IMITREX) injection 6 mg (6 mg Subcutaneous Given 23)             Medical Decision Makin-year-old female here with atraumatic left-sided headache preceded by an aura and typical for her migraines.  Neuro exam is normal, no compounding fever or trauma.  No neurodeficits on examination here.  Low suspicion for subarachnoid hemorrhage, cerebral venous thrombosis, ischemic event, space-occupying lesion, meningitis.  Blood pressures not elevated.  Having no abdominal pain or vaginal bleeding.  Symptoms have improving after the therapies given in the emergency department.  Discussed with her suspicions for occult subarachnoid meningitis are low I do not think she needs lumbar puncture also do not think she needs advanced imaging getting MRI.  She is comfortable agreeable plan of discharge home following symptoms return if worse.      Diagnosis:    ICD-10-CM    1. Migraine with aura and without status migrainosus, not intractable  G43.109             Disposition:  Home      Ascencion Coats MD  [facility]  [facility]       Ascencion Coats MD  01/10/23 9948

## 2023-01-10 NOTE — ED NOTES
PIT/Triage Evaluation    Patient presented headache.  She has pain on the left side of her head with an aura.  She has a history of migraines and this feels like typical migraine headache.  He is unable to take her sumatriptan as she is currently 34 weeks pregnant.  No trauma to the head.  She has no vision changes at this time.  She comes in prompting her OB because she is had 3 migraine headaches over the last 2 days.  She has no abdominal pain or pelvic pain.  No vaginal bleeding or discharge or other pregnancy concerns.  She had some numbness in her left hand yesterday that resolved and that did not feel normal with her migraines but is gone.  She has no other numbness or weakness.    Constitutional: Well appearing.  HEENT: Atraumatic.  PERRL.  EOMI.  Moist mucous membranes.  Neck: Soft.  Supple.   Abdomen: Soft gravid abdomen that is nontender to palpation.  Musculoskeletal: No edema.  Normal range of motion.  Neurologic: Alert and oriented.  Normal tone and bulk.  No facial drooping.  Normal speech.  5/5 strength in bilateral upper and lower extremities.  Sensation to light touch intact throughout.  Normal gait.  Skin: No rashes.  No edema.  Psych: Normal affect.  Normal behavior.              Appropriate interventions for symptom management were initiated if applicable.  Appropriate diagnostic tests were initiated if indicated.    Important information for subsequent clinician: We will attempt migraine treatment IV fluids, Reglan, Benadryl.  Discussed an MRV and MRA of her head given her pregnancy and increased risk for venous sinus stenosis, however, she declined at this time would like to attempt symptomatic treatment first.    I briefly evaluated the patient and developed an initial plan of care. I discussed this plan and explained that this brief interaction does not constitute a full evaluation. Patient/family understands that they should wait to be fully evaluated and discuss any test results with  another clinician prior to leaving the hospital.       Luis Alfredo San MD  01/09/23 2499

## 2023-01-10 NOTE — ED TRIAGE NOTES
Pt arrives to the ED due to having a headache that began around 1330. Pt states h/o of migraines. Had headache yesterday as well. Pt c/o of nausea. Currently 34 wks pregnant.

## 2023-01-11 ENCOUNTER — HOSPITAL ENCOUNTER (INPATIENT)
Facility: CLINIC | Age: 33
Setting detail: SURGERY ADMIT
End: 2023-01-11
Attending: OBSTETRICS & GYNECOLOGY | Admitting: OBSTETRICS & GYNECOLOGY
Payer: COMMERCIAL

## 2023-01-12 RX ORDER — OXYTOCIN/0.9 % SODIUM CHLORIDE 30/500 ML
340 PLASTIC BAG, INJECTION (ML) INTRAVENOUS CONTINUOUS PRN
Status: CANCELLED | OUTPATIENT
Start: 2023-01-12

## 2023-01-12 RX ORDER — OXYTOCIN 10 [USP'U]/ML
10 INJECTION, SOLUTION INTRAMUSCULAR; INTRAVENOUS
Status: CANCELLED | OUTPATIENT
Start: 2023-01-12

## 2023-01-12 RX ORDER — TRANEXAMIC ACID 10 MG/ML
1 INJECTION, SOLUTION INTRAVENOUS EVERY 30 MIN PRN
Status: CANCELLED | OUTPATIENT
Start: 2023-01-12

## 2023-01-12 RX ORDER — LIDOCAINE 40 MG/G
CREAM TOPICAL
Status: CANCELLED | OUTPATIENT
Start: 2023-01-12

## 2023-01-12 RX ORDER — METHYLERGONOVINE MALEATE 0.2 MG/ML
200 INJECTION INTRAVENOUS
Status: CANCELLED | OUTPATIENT
Start: 2023-01-12

## 2023-01-12 RX ORDER — CEFAZOLIN SODIUM/WATER 2 G/20 ML
2 SYRINGE (ML) INTRAVENOUS SEE ADMIN INSTRUCTIONS
Status: CANCELLED | OUTPATIENT
Start: 2023-01-12

## 2023-01-12 RX ORDER — CITRIC ACID/SODIUM CITRATE 334-500MG
30 SOLUTION, ORAL ORAL
Status: CANCELLED | OUTPATIENT
Start: 2023-01-12

## 2023-01-12 RX ORDER — MISOPROSTOL 200 UG/1
400 TABLET ORAL
Status: CANCELLED | OUTPATIENT
Start: 2023-01-12

## 2023-01-12 RX ORDER — CARBOPROST TROMETHAMINE 250 UG/ML
250 INJECTION, SOLUTION INTRAMUSCULAR
Status: CANCELLED | OUTPATIENT
Start: 2023-01-12

## 2023-01-12 RX ORDER — MISOPROSTOL 200 UG/1
800 TABLET ORAL
Status: CANCELLED | OUTPATIENT
Start: 2023-01-12

## 2023-01-12 RX ORDER — CEFAZOLIN SODIUM/WATER 2 G/20 ML
2 SYRINGE (ML) INTRAVENOUS
Status: CANCELLED | OUTPATIENT
Start: 2023-01-12

## 2023-01-12 RX ORDER — OXYTOCIN/0.9 % SODIUM CHLORIDE 30/500 ML
100-340 PLASTIC BAG, INJECTION (ML) INTRAVENOUS CONTINUOUS PRN
Status: CANCELLED | OUTPATIENT
Start: 2023-01-12

## 2023-01-12 RX ORDER — ACETAMINOPHEN 325 MG/1
975 TABLET ORAL ONCE
Status: CANCELLED | OUTPATIENT
Start: 2023-01-12 | End: 2023-01-12

## 2023-01-12 RX ORDER — SODIUM CHLORIDE, SODIUM LACTATE, POTASSIUM CHLORIDE, CALCIUM CHLORIDE 600; 310; 30; 20 MG/100ML; MG/100ML; MG/100ML; MG/100ML
INJECTION, SOLUTION INTRAVENOUS CONTINUOUS
Status: CANCELLED | OUTPATIENT
Start: 2023-01-12

## 2023-01-18 RX ORDER — TRANEXAMIC ACID 10 MG/ML
1 INJECTION, SOLUTION INTRAVENOUS EVERY 30 MIN PRN
Status: CANCELLED | OUTPATIENT
Start: 2023-01-18

## 2023-01-18 RX ORDER — MISOPROSTOL 200 UG/1
400 TABLET ORAL
Status: CANCELLED | OUTPATIENT
Start: 2023-01-18

## 2023-01-18 RX ORDER — LIDOCAINE 40 MG/G
CREAM TOPICAL
Status: CANCELLED | OUTPATIENT
Start: 2023-01-18

## 2023-01-18 RX ORDER — OXYTOCIN 10 [USP'U]/ML
10 INJECTION, SOLUTION INTRAMUSCULAR; INTRAVENOUS
Status: CANCELLED | OUTPATIENT
Start: 2023-01-18

## 2023-01-18 RX ORDER — OXYTOCIN/0.9 % SODIUM CHLORIDE 30/500 ML
340 PLASTIC BAG, INJECTION (ML) INTRAVENOUS CONTINUOUS PRN
Status: CANCELLED | OUTPATIENT
Start: 2023-01-18

## 2023-01-18 RX ORDER — CARBOPROST TROMETHAMINE 250 UG/ML
250 INJECTION, SOLUTION INTRAMUSCULAR
Status: CANCELLED | OUTPATIENT
Start: 2023-01-18

## 2023-01-18 RX ORDER — METHYLERGONOVINE MALEATE 0.2 MG/ML
200 INJECTION INTRAVENOUS
Status: CANCELLED | OUTPATIENT
Start: 2023-01-18

## 2023-01-18 RX ORDER — CEFAZOLIN SODIUM/WATER 2 G/20 ML
2 SYRINGE (ML) INTRAVENOUS SEE ADMIN INSTRUCTIONS
Status: CANCELLED | OUTPATIENT
Start: 2023-01-18

## 2023-01-18 RX ORDER — ACETAMINOPHEN 325 MG/1
975 TABLET ORAL ONCE
Status: CANCELLED | OUTPATIENT
Start: 2023-01-18 | End: 2023-01-18

## 2023-01-18 RX ORDER — MISOPROSTOL 200 UG/1
800 TABLET ORAL
Status: CANCELLED | OUTPATIENT
Start: 2023-01-18

## 2023-01-18 RX ORDER — SODIUM CHLORIDE, SODIUM LACTATE, POTASSIUM CHLORIDE, CALCIUM CHLORIDE 600; 310; 30; 20 MG/100ML; MG/100ML; MG/100ML; MG/100ML
INJECTION, SOLUTION INTRAVENOUS CONTINUOUS
Status: CANCELLED | OUTPATIENT
Start: 2023-01-18

## 2023-01-18 RX ORDER — CEFAZOLIN SODIUM/WATER 2 G/20 ML
2 SYRINGE (ML) INTRAVENOUS
Status: CANCELLED | OUTPATIENT
Start: 2023-01-18

## 2023-01-18 RX ORDER — CITRIC ACID/SODIUM CITRATE 334-500MG
30 SOLUTION, ORAL ORAL
Status: CANCELLED | OUTPATIENT
Start: 2023-01-18

## 2023-01-18 RX ORDER — OXYTOCIN/0.9 % SODIUM CHLORIDE 30/500 ML
100-340 PLASTIC BAG, INJECTION (ML) INTRAVENOUS CONTINUOUS PRN
Status: CANCELLED | OUTPATIENT
Start: 2023-01-18

## 2023-01-24 LAB — GROUP B STREPTOCOCCUS (EXTERNAL): NEGATIVE

## 2023-01-30 RX ORDER — LEVOTHYROXINE SODIUM 25 UG/1
TABLET ORAL
COMMUNITY
Start: 2022-12-29 | End: 2023-02-03 | Stop reason: HOSPADM

## 2023-01-30 RX ORDER — PRENATAL VIT/IRON FUM/FOLIC AC 27MG-0.8MG
1 TABLET ORAL DAILY
COMMUNITY
End: 2023-02-03 | Stop reason: HOSPADM

## 2023-02-12 ENCOUNTER — OFFICE VISIT (OUTPATIENT)
Dept: URGENT CARE | Facility: URGENT CARE | Age: 33
End: 2023-02-12
Payer: COMMERCIAL

## 2023-02-12 VITALS
OXYGEN SATURATION: 95 % | HEART RATE: 90 BPM | DIASTOLIC BLOOD PRESSURE: 73 MMHG | TEMPERATURE: 98.6 F | SYSTOLIC BLOOD PRESSURE: 110 MMHG

## 2023-02-12 DIAGNOSIS — R07.0 THROAT PAIN: Primary | ICD-10-CM

## 2023-02-12 LAB — DEPRECATED S PYO AG THROAT QL EIA: NEGATIVE

## 2023-02-12 PROCEDURE — 87651 STREP A DNA AMP PROBE: CPT | Performed by: PHYSICIAN ASSISTANT

## 2023-02-12 PROCEDURE — 99212 OFFICE O/P EST SF 10 MIN: CPT | Performed by: PHYSICIAN ASSISTANT

## 2023-02-12 ASSESSMENT — ENCOUNTER SYMPTOMS
VOMITING: 0
DIARRHEA: 0
FEVER: 0
COUGH: 0
SORE THROAT: 1

## 2023-02-12 NOTE — PROGRESS NOTES
Assessment & Plan:        ICD-10-CM    1. Throat pain  R07.0 Streptococcus A Rapid Screen w/Reflex to PCR     Group A Streptococcus PCR Throat Swab            Plan/Clinical Decision Making:    Patient with mild nasal congestion and slight throat irritation. Afebrile, no other signs or symptoms of illness, normal exam. Negative strep test.   Consider covid test if developing illness. Monitor symptoms.     Return if symptoms worsen or fail to improve, for in 3-5 days.     At the end of the encounter, I discussed results, diagnosis, medications. Discussed red flags for immediate return to clinic/ER, as well as indications for follow up if no improvement. Patient understood and agreed to plan. Patient was stable for discharge.        Brook Martinez PA-C on 2/12/2023 at 2:24 PM          Subjective:     HPI:    Rodger is a 32 year old female who presents to clinic today for the following health issues:  Chief Complaint   Patient presents with     Urgent Care     Irritated throat which started today.     HPI    ST for the past several days. Has had some sinus symptoms last several days.   Patient pregnant and being induced tomorrow. 39 weeks pregnant.   Symptoms, mild, but daughter had acute vomiting and wanted to make sure that she is okay.     History obtained from the patient.    Review of Systems   Constitutional: Negative for fever.   HENT: Positive for congestion and sore throat.    Respiratory: Negative for cough.    Gastrointestinal: Negative for diarrhea and vomiting.         Patient Active Problem List   Diagnosis     Anxiety - more social anxiety      Abdominal bloating     IUD (intrauterine device) in place - Mirena IUD placed 10/13/2020 at 8 wks postpartum - replace in 2025      Migraine with aura and without status migrainosus, not intractable- can't do estrogen/combination ocp's      Altitude sickness preventative measures     PIH (pregnancy induced hypertension)     Indication for care in labor or delivery      Vaginal delivery     Insomnia secondary to anxiety     Attention and concentration deficit- no previous dx of ADHD - would like to do testing         Past Medical History:   Diagnosis Date     History of chicken pox      IUD (intrauterine device) in place 02/01/2014    has appt for replacement 1/2019 with [facility]       Migraine with aura and without status migrainosus, not intractable 11/15/2018     PIH (pregnancy induced hypertension) 08/13/2020     PNA (pneumonia) 05/2016    LLL     PPD screening test     Test was negative, she had exposure in [place]     Thyroid disease     Pregnacy induced       Social History     Tobacco Use     Smoking status: Never     Smokeless tobacco: Never   Substance Use Topics     Alcohol use: Not Currently             Objective:     Vitals:    02/12/23 1401   BP: 110/73   BP Location: Right arm   Patient Position: Sitting   Cuff Size: Adult Regular   Pulse: 90   Temp: 98.6  F (37  C)   TempSrc: Tympanic   SpO2: 95%         Physical Exam   EXAM:   Pleasant, alert, appropriate appearance. NAD.  Head Exam: Normocephalic, atraumatic.  Eye Exam:  PERRLA, EOMI, non icteric/injection.    Ear Exam: TMs grey without bulging. Normal canals.  Normal pinna.  Nose Exam: Normal external nose.    OroPharynx Exam:  Moist mucous membranes. No erythema, pharynx without exudate or hypertrophy.  Neck/Thyroid Exam:  No LAD.   Chest/Respiratory Exam: CTAB.  Cardiovascular Exam: RRR. No murmur or rubs.        Results:  Results for orders placed or performed in visit on 02/12/23   Streptococcus A Rapid Screen w/Reflex to PCR     Status: Normal    Specimen: Throat; Swab   Result Value Ref Range    Group A Strep antigen Negative Negative

## 2023-02-13 ENCOUNTER — HOSPITAL ENCOUNTER (INPATIENT)
Facility: CLINIC | Age: 33
LOS: 1 days | Discharge: HOME | End: 2023-02-14
Attending: OBSTETRICS & GYNECOLOGY | Admitting: OBSTETRICS & GYNECOLOGY
Payer: COMMERCIAL

## 2023-02-13 ENCOUNTER — ANESTHESIA EVENT (OUTPATIENT)
Dept: OBGYN | Facility: CLINIC | Age: 33
End: 2023-02-13
Payer: COMMERCIAL

## 2023-02-13 ENCOUNTER — ANESTHESIA (OUTPATIENT)
Dept: OBGYN | Facility: CLINIC | Age: 33
End: 2023-02-13
Payer: COMMERCIAL

## 2023-02-13 LAB
ABO/RH(D): ABNORMAL
ANTIBODY SCREEN: POSITIVE
BLD PROD TYP BPU: NORMAL
BLD PROD TYP BPU: NORMAL
BLOOD COMPONENT TYPE: NORMAL
BLOOD COMPONENT TYPE: NORMAL
CODING SYSTEM: NORMAL
CODING SYSTEM: NORMAL
CROSSMATCH: NORMAL
CROSSMATCH: NORMAL
GROUP A STREP BY PCR: NOT DETECTED
HOLD SPECIMEN: NORMAL
SPECIMEN EXPIRATION DATE: ABNORMAL
T PALLIDUM AB SER QL: NONREACTIVE
UNIT ABO/RH: NORMAL
UNIT ABO/RH: NORMAL
UNIT NUMBER: NORMAL
UNIT NUMBER: NORMAL
UNIT STATUS: NORMAL
UNIT STATUS: NORMAL
UNIT TYPE ISBT: 6200
UNIT TYPE ISBT: 6200

## 2023-02-13 PROCEDURE — 86870 RBC ANTIBODY IDENTIFICATION: CPT | Performed by: OBSTETRICS & GYNECOLOGY

## 2023-02-13 PROCEDURE — 250N000011 HC RX IP 250 OP 636: Performed by: ANESTHESIOLOGY

## 2023-02-13 PROCEDURE — 86880 COOMBS TEST DIRECT: CPT | Performed by: OBSTETRICS & GYNECOLOGY

## 2023-02-13 PROCEDURE — 86922 COMPATIBILITY TEST ANTIGLOB: CPT | Performed by: OBSTETRICS & GYNECOLOGY

## 2023-02-13 PROCEDURE — 258N000003 HC RX IP 258 OP 636: Performed by: OBSTETRICS & GYNECOLOGY

## 2023-02-13 PROCEDURE — 86906 BLD TYPING SEROLOGIC RH PHNT: CPT | Performed by: OBSTETRICS & GYNECOLOGY

## 2023-02-13 PROCEDURE — 00HU33Z INSERTION OF INFUSION DEVICE INTO SPINAL CANAL, PERCUTANEOUS APPROACH: ICD-10-PCS | Performed by: ANESTHESIOLOGY

## 2023-02-13 PROCEDURE — 120N000001 HC R&B MED SURG/OB

## 2023-02-13 PROCEDURE — 0KQM0ZZ REPAIR PERINEUM MUSCLE, OPEN APPROACH: ICD-10-PCS | Performed by: OBSTETRICS & GYNECOLOGY

## 2023-02-13 PROCEDURE — 86978 RBC PRETREATMENT SERUM: CPT | Performed by: OBSTETRICS & GYNECOLOGY

## 2023-02-13 PROCEDURE — 250N000013 HC RX MED GY IP 250 OP 250 PS 637: Performed by: OBSTETRICS & GYNECOLOGY

## 2023-02-13 PROCEDURE — 3E033VJ INTRODUCTION OF OTHER HORMONE INTO PERIPHERAL VEIN, PERCUTANEOUS APPROACH: ICD-10-PCS | Performed by: OBSTETRICS & GYNECOLOGY

## 2023-02-13 PROCEDURE — 722N000001 HC LABOR CARE VAGINAL DELIVERY SINGLE

## 2023-02-13 PROCEDURE — 86901 BLOOD TYPING SEROLOGIC RH(D): CPT | Performed by: OBSTETRICS & GYNECOLOGY

## 2023-02-13 PROCEDURE — 370N000003 HC ANESTHESIA WARD SERVICE

## 2023-02-13 PROCEDURE — 258N000003 HC RX IP 258 OP 636: Performed by: ANESTHESIOLOGY

## 2023-02-13 PROCEDURE — 84999 UNLISTED CHEMISTRY PROCEDURE: CPT | Performed by: OBSTETRICS & GYNECOLOGY

## 2023-02-13 PROCEDURE — 10907ZC DRAINAGE OF AMNIOTIC FLUID, THERAPEUTIC FROM PRODUCTS OF CONCEPTION, VIA NATURAL OR ARTIFICIAL OPENING: ICD-10-PCS | Performed by: OBSTETRICS & GYNECOLOGY

## 2023-02-13 PROCEDURE — 86780 TREPONEMA PALLIDUM: CPT | Performed by: OBSTETRICS & GYNECOLOGY

## 2023-02-13 PROCEDURE — 250N000011 HC RX IP 250 OP 636: Performed by: OBSTETRICS & GYNECOLOGY

## 2023-02-13 PROCEDURE — 250N000009 HC RX 250: Performed by: OBSTETRICS & GYNECOLOGY

## 2023-02-13 PROCEDURE — 86850 RBC ANTIBODY SCREEN: CPT | Performed by: OBSTETRICS & GYNECOLOGY

## 2023-02-13 PROCEDURE — 3E0R3BZ INTRODUCTION OF ANESTHETIC AGENT INTO SPINAL CANAL, PERCUTANEOUS APPROACH: ICD-10-PCS | Performed by: ANESTHESIOLOGY

## 2023-02-13 RX ORDER — DOCUSATE SODIUM 100 MG/1
100 CAPSULE, LIQUID FILLED ORAL DAILY
Status: DISCONTINUED | OUTPATIENT
Start: 2023-02-13 | End: 2023-02-14 | Stop reason: HOSPADM

## 2023-02-13 RX ORDER — OXYTOCIN 10 [USP'U]/ML
10 INJECTION, SOLUTION INTRAMUSCULAR; INTRAVENOUS
Status: DISCONTINUED | OUTPATIENT
Start: 2023-02-13 | End: 2023-02-14 | Stop reason: HOSPADM

## 2023-02-13 RX ORDER — LIDOCAINE 40 MG/G
CREAM TOPICAL
Status: DISCONTINUED | OUTPATIENT
Start: 2023-02-13 | End: 2023-02-13

## 2023-02-13 RX ORDER — IBUPROFEN 800 MG/1
800 TABLET, FILM COATED ORAL
Status: DISCONTINUED | OUTPATIENT
Start: 2023-02-13 | End: 2023-02-13

## 2023-02-13 RX ORDER — PROCHLORPERAZINE 25 MG
25 SUPPOSITORY, RECTAL RECTAL EVERY 12 HOURS PRN
Status: DISCONTINUED | OUTPATIENT
Start: 2023-02-13 | End: 2023-02-13

## 2023-02-13 RX ORDER — MISOPROSTOL 200 UG/1
800 TABLET ORAL
Status: DISCONTINUED | OUTPATIENT
Start: 2023-02-13 | End: 2023-02-13

## 2023-02-13 RX ORDER — OXYTOCIN/0.9 % SODIUM CHLORIDE 30/500 ML
340 PLASTIC BAG, INJECTION (ML) INTRAVENOUS CONTINUOUS PRN
Status: COMPLETED | OUTPATIENT
Start: 2023-02-13 | End: 2023-02-13

## 2023-02-13 RX ORDER — KETOROLAC TROMETHAMINE 30 MG/ML
INJECTION, SOLUTION INTRAMUSCULAR; INTRAVENOUS
Status: DISCONTINUED
Start: 2023-02-13 | End: 2023-02-13 | Stop reason: HOSPADM

## 2023-02-13 RX ORDER — HYDROCORTISONE 25 MG/G
CREAM TOPICAL 3 TIMES DAILY PRN
Status: DISCONTINUED | OUTPATIENT
Start: 2023-02-13 | End: 2023-02-14 | Stop reason: HOSPADM

## 2023-02-13 RX ORDER — METHYLERGONOVINE MALEATE 0.2 MG/ML
200 INJECTION INTRAVENOUS
Status: DISCONTINUED | OUTPATIENT
Start: 2023-02-13 | End: 2023-02-13

## 2023-02-13 RX ORDER — FENTANYL CITRATE 50 UG/ML
100 INJECTION, SOLUTION INTRAMUSCULAR; INTRAVENOUS
Status: DISCONTINUED | OUTPATIENT
Start: 2023-02-13 | End: 2023-02-13

## 2023-02-13 RX ORDER — FENTANYL CITRATE-0.9 % NACL/PF 10 MCG/ML
100 PLASTIC BAG, INJECTION (ML) INTRAVENOUS EVERY 5 MIN PRN
Status: DISCONTINUED | OUTPATIENT
Start: 2023-02-13 | End: 2023-02-13

## 2023-02-13 RX ORDER — NALOXONE HYDROCHLORIDE 0.4 MG/ML
0.2 INJECTION, SOLUTION INTRAMUSCULAR; INTRAVENOUS; SUBCUTANEOUS
Status: DISCONTINUED | OUTPATIENT
Start: 2023-02-13 | End: 2023-02-13

## 2023-02-13 RX ORDER — MISOPROSTOL 200 UG/1
400 TABLET ORAL
Status: DISCONTINUED | OUTPATIENT
Start: 2023-02-13 | End: 2023-02-13

## 2023-02-13 RX ORDER — OXYTOCIN/0.9 % SODIUM CHLORIDE 30/500 ML
340 PLASTIC BAG, INJECTION (ML) INTRAVENOUS CONTINUOUS PRN
Status: DISCONTINUED | OUTPATIENT
Start: 2023-02-13 | End: 2023-02-13

## 2023-02-13 RX ORDER — MISOPROSTOL 200 UG/1
800 TABLET ORAL
Status: DISCONTINUED | OUTPATIENT
Start: 2023-02-13 | End: 2023-02-14 | Stop reason: HOSPADM

## 2023-02-13 RX ORDER — OXYTOCIN/0.9 % SODIUM CHLORIDE 30/500 ML
1-24 PLASTIC BAG, INJECTION (ML) INTRAVENOUS CONTINUOUS
Status: DISCONTINUED | OUTPATIENT
Start: 2023-02-13 | End: 2023-02-13

## 2023-02-13 RX ORDER — METHYLERGONOVINE MALEATE 0.2 MG/ML
200 INJECTION INTRAVENOUS
Status: DISCONTINUED | OUTPATIENT
Start: 2023-02-13 | End: 2023-02-14 | Stop reason: HOSPADM

## 2023-02-13 RX ORDER — CARBOPROST TROMETHAMINE 250 UG/ML
250 INJECTION, SOLUTION INTRAMUSCULAR
Status: DISCONTINUED | OUTPATIENT
Start: 2023-02-13 | End: 2023-02-13

## 2023-02-13 RX ORDER — METOCLOPRAMIDE 10 MG/1
10 TABLET ORAL EVERY 6 HOURS PRN
Status: DISCONTINUED | OUTPATIENT
Start: 2023-02-13 | End: 2023-02-13

## 2023-02-13 RX ORDER — NALOXONE HYDROCHLORIDE 0.4 MG/ML
0.4 INJECTION, SOLUTION INTRAMUSCULAR; INTRAVENOUS; SUBCUTANEOUS
Status: DISCONTINUED | OUTPATIENT
Start: 2023-02-13 | End: 2023-02-13

## 2023-02-13 RX ORDER — TRANEXAMIC ACID 10 MG/ML
1 INJECTION, SOLUTION INTRAVENOUS EVERY 30 MIN PRN
Status: DISCONTINUED | OUTPATIENT
Start: 2023-02-13 | End: 2023-02-14 | Stop reason: HOSPADM

## 2023-02-13 RX ORDER — KETOROLAC TROMETHAMINE 30 MG/ML
30 INJECTION, SOLUTION INTRAMUSCULAR; INTRAVENOUS
Status: DISCONTINUED | OUTPATIENT
Start: 2023-02-13 | End: 2023-02-13

## 2023-02-13 RX ORDER — OXYTOCIN 10 [USP'U]/ML
10 INJECTION, SOLUTION INTRAMUSCULAR; INTRAVENOUS
Status: DISCONTINUED | OUTPATIENT
Start: 2023-02-13 | End: 2023-02-13

## 2023-02-13 RX ORDER — PROCHLORPERAZINE MALEATE 10 MG
10 TABLET ORAL EVERY 6 HOURS PRN
Status: DISCONTINUED | OUTPATIENT
Start: 2023-02-13 | End: 2023-02-13

## 2023-02-13 RX ORDER — NALBUPHINE HYDROCHLORIDE 10 MG/ML
2.5-5 INJECTION, SOLUTION INTRAMUSCULAR; INTRAVENOUS; SUBCUTANEOUS EVERY 6 HOURS PRN
Status: DISCONTINUED | OUTPATIENT
Start: 2023-02-13 | End: 2023-02-13

## 2023-02-13 RX ORDER — CITRIC ACID/SODIUM CITRATE 334-500MG
30 SOLUTION, ORAL ORAL ONCE
Status: DISCONTINUED | OUTPATIENT
Start: 2023-02-13 | End: 2023-02-13

## 2023-02-13 RX ORDER — OXYTOCIN/0.9 % SODIUM CHLORIDE 30/500 ML
100-340 PLASTIC BAG, INJECTION (ML) INTRAVENOUS CONTINUOUS PRN
Status: DISCONTINUED | OUTPATIENT
Start: 2023-02-13 | End: 2023-02-13

## 2023-02-13 RX ORDER — TRANEXAMIC ACID 10 MG/ML
1 INJECTION, SOLUTION INTRAVENOUS EVERY 30 MIN PRN
Status: DISCONTINUED | OUTPATIENT
Start: 2023-02-13 | End: 2023-02-13

## 2023-02-13 RX ORDER — CITRIC ACID/SODIUM CITRATE 334-500MG
30 SOLUTION, ORAL ORAL
Status: DISCONTINUED | OUTPATIENT
Start: 2023-02-13 | End: 2023-02-13

## 2023-02-13 RX ORDER — BUPIVACAINE HYDROCHLORIDE 2.5 MG/ML
10 INJECTION, SOLUTION EPIDURAL; INFILTRATION; INTRACAUDAL ONCE
Status: DISCONTINUED | OUTPATIENT
Start: 2023-02-13 | End: 2023-02-13

## 2023-02-13 RX ORDER — ONDANSETRON 2 MG/ML
4 INJECTION INTRAMUSCULAR; INTRAVENOUS EVERY 6 HOURS PRN
Status: DISCONTINUED | OUTPATIENT
Start: 2023-02-13 | End: 2023-02-13

## 2023-02-13 RX ORDER — BISACODYL 10 MG
10 SUPPOSITORY, RECTAL RECTAL DAILY PRN
Status: DISCONTINUED | OUTPATIENT
Start: 2023-02-13 | End: 2023-02-14 | Stop reason: HOSPADM

## 2023-02-13 RX ORDER — ONDANSETRON 4 MG/1
4 TABLET, ORALLY DISINTEGRATING ORAL EVERY 6 HOURS PRN
Status: DISCONTINUED | OUTPATIENT
Start: 2023-02-13 | End: 2023-02-13

## 2023-02-13 RX ORDER — CARBOPROST TROMETHAMINE 250 UG/ML
250 INJECTION, SOLUTION INTRAMUSCULAR
Status: DISCONTINUED | OUTPATIENT
Start: 2023-02-13 | End: 2023-02-14 | Stop reason: HOSPADM

## 2023-02-13 RX ORDER — METOCLOPRAMIDE HYDROCHLORIDE 5 MG/ML
10 INJECTION INTRAMUSCULAR; INTRAVENOUS EVERY 6 HOURS PRN
Status: DISCONTINUED | OUTPATIENT
Start: 2023-02-13 | End: 2023-02-13

## 2023-02-13 RX ORDER — MISOPROSTOL 200 UG/1
400 TABLET ORAL
Status: DISCONTINUED | OUTPATIENT
Start: 2023-02-13 | End: 2023-02-14 | Stop reason: HOSPADM

## 2023-02-13 RX ORDER — SODIUM CHLORIDE, SODIUM LACTATE, POTASSIUM CHLORIDE, CALCIUM CHLORIDE 600; 310; 30; 20 MG/100ML; MG/100ML; MG/100ML; MG/100ML
INJECTION, SOLUTION INTRAVENOUS CONTINUOUS PRN
Status: DISCONTINUED | OUTPATIENT
Start: 2023-02-13 | End: 2023-02-13

## 2023-02-13 RX ORDER — ACETAMINOPHEN 325 MG/1
650 TABLET ORAL EVERY 4 HOURS PRN
Status: DISCONTINUED | OUTPATIENT
Start: 2023-02-13 | End: 2023-02-14 | Stop reason: HOSPADM

## 2023-02-13 RX ORDER — IBUPROFEN 800 MG/1
800 TABLET, FILM COATED ORAL EVERY 6 HOURS PRN
Status: DISCONTINUED | OUTPATIENT
Start: 2023-02-13 | End: 2023-02-14 | Stop reason: HOSPADM

## 2023-02-13 RX ORDER — SCOLOPAMINE TRANSDERMAL SYSTEM 1 MG/1
1 PATCH, EXTENDED RELEASE TRANSDERMAL
Status: DISCONTINUED | OUTPATIENT
Start: 2023-02-13 | End: 2023-02-13

## 2023-02-13 RX ORDER — CITALOPRAM HYDROBROMIDE 20 MG/1
20 TABLET ORAL DAILY
Status: DISCONTINUED | OUTPATIENT
Start: 2023-02-14 | End: 2023-02-13

## 2023-02-13 RX ORDER — SODIUM CHLORIDE, SODIUM LACTATE, POTASSIUM CHLORIDE, CALCIUM CHLORIDE 600; 310; 30; 20 MG/100ML; MG/100ML; MG/100ML; MG/100ML
INJECTION, SOLUTION INTRAVENOUS CONTINUOUS
Status: DISCONTINUED | OUTPATIENT
Start: 2023-02-13 | End: 2023-02-13

## 2023-02-13 RX ORDER — MODIFIED LANOLIN
OINTMENT (GRAM) TOPICAL
Status: DISCONTINUED | OUTPATIENT
Start: 2023-02-13 | End: 2023-02-14 | Stop reason: HOSPADM

## 2023-02-13 RX ADMIN — TRANEXAMIC ACID 1 G: 10 INJECTION, SOLUTION INTRAVENOUS at 15:44

## 2023-02-13 RX ADMIN — ACETAMINOPHEN 650 MG: 325 TABLET ORAL at 16:43

## 2023-02-13 RX ADMIN — FENTANYL CITRATE 100 MCG: 50 INJECTION INTRAMUSCULAR; INTRAVENOUS at 12:32

## 2023-02-13 RX ADMIN — ACETAMINOPHEN 650 MG: 325 TABLET ORAL at 20:41

## 2023-02-13 RX ADMIN — Medication 2 MILLI-UNITS/MIN: at 08:43

## 2023-02-13 RX ADMIN — SODIUM CHLORIDE, POTASSIUM CHLORIDE, SODIUM LACTATE AND CALCIUM CHLORIDE: 600; 310; 30; 20 INJECTION, SOLUTION INTRAVENOUS at 13:50

## 2023-02-13 RX ADMIN — SODIUM CHLORIDE, POTASSIUM CHLORIDE, SODIUM LACTATE AND CALCIUM CHLORIDE 500 ML: 600; 310; 30; 20 INJECTION, SOLUTION INTRAVENOUS at 13:25

## 2023-02-13 RX ADMIN — Medication 100 ML/HR: at 16:28

## 2023-02-13 RX ADMIN — IBUPROFEN 800 MG: 800 TABLET, FILM COATED ORAL at 20:41

## 2023-02-13 RX ADMIN — KETOROLAC TROMETHAMINE 30 MG: 30 INJECTION, SOLUTION INTRAMUSCULAR at 14:43

## 2023-02-13 RX ADMIN — Medication: at 13:52

## 2023-02-13 RX ADMIN — SODIUM CHLORIDE, POTASSIUM CHLORIDE, SODIUM LACTATE AND CALCIUM CHLORIDE: 600; 310; 30; 20 INJECTION, SOLUTION INTRAVENOUS at 08:31

## 2023-02-13 ASSESSMENT — ACTIVITIES OF DAILY LIVING (ADL)
VISION_MANAGEMENT: CONTACTS, GLASSES
ADLS_ACUITY_SCORE: 20
DIFFICULTY_EATING/SWALLOWING: NO
TOILETING_ISSUES: NO
ADLS_ACUITY_SCORE: 20
ADLS_ACUITY_SCORE: 20
DOING_ERRANDS_INDEPENDENTLY_DIFFICULTY: NO
ADLS_ACUITY_SCORE: 20
CONCENTRATING,_REMEMBERING_OR_MAKING_DECISIONS_DIFFICULTY: NO
WEAR_GLASSES_OR_BLIND: YES
ADLS_ACUITY_SCORE: 20
ADLS_ACUITY_SCORE: 20
CHANGE_IN_FUNCTIONAL_STATUS_SINCE_ONSET_OF_CURRENT_ILLNESS/INJURY: NO
ADLS_ACUITY_SCORE: 20
DRESSING/BATHING_DIFFICULTY: NO
FALL_HISTORY_WITHIN_LAST_SIX_MONTHS: NO
ADLS_ACUITY_SCORE: 20
WALKING_OR_CLIMBING_STAIRS_DIFFICULTY: NO

## 2023-02-13 NOTE — PLAN OF CARE
Data: [facility] presented to [facility]: 2023  7:29 AM.  Patient admitted for induction for elective. Patient is a .  Prenatal record reviewed. Pregnancy  has been complicated by low-lying placenta that has resolved as of 23.  Gestational Age 39w1d. VSS. Fetal movement active. Patient denies uterine contractions, leaking of vaginal fluid/rupture of membranes, vaginal bleeding, abdominal pain, nausea, vomiting, headache, visual disturbances, epigastric or URQ pain, significant edema. Support person is present.   Action: Verbal consent for EFM.  Admission assessment completed. Bill of rights reviewed.  Response: Patient verbalized agreement with plan. Will contact Dr Angelica Calabrese with update and further orders.

## 2023-02-13 NOTE — PROVIDER NOTIFICATION
02/13/23 1540   Provider Notification   Provider Name/Title Dr. TRE Calabrese   Method of Notification Phone   Request Evaluate-Remote   Notification Reason Status Update  (bleeding)   MD updated re: moderate bleeding since delivery, fundus firm at U/U but flow is consistently moderate, no clots. QBL 264ml since delivery. Pitocin infusing at 300ml/hr. TORB for 1g TXA to be given now, expect bleeding to slow thereafter, will update MD if bleeding does not taper. See MAR

## 2023-02-13 NOTE — PLAN OF CARE
Data: Allison J Bazinet transferred to [id number] via wheelchair at 1700. Baby transferred via parent's arms.  Action: Receiving unit notified of transfer: Yes. Patient and family notified of room change. Report given to JENNY Boyd at 1710. Belongings sent to receiving unit. Accompanied by Registered Nurse. Oriented patient to surroundings. Call light within reach. ID bands double-checked with receiving RN.  Response: Patient tolerated transfer and is stable.

## 2023-02-13 NOTE — PROGRESS NOTES
Ali was dilated to a 2 upon arrival, Dr. Calabrese performed AROM to scant clear/blood tinged fluid at 0818. Pitocin was started at 2 milliunits and increased by 2 approximately every 30-45 min to a total of 12 milliunits per hour. At 12:15 Rodger began to feel more uncomfortable and requested to try Fentanyl for pain management. Rodger was given 100 mcg of fentanyl IV at 1230. She felt sleepy and laid on her left side. Approximately 45 min later 1310, Rodger called this RN to bedside requesting an epidural and reporting increased pressure in her rectum. RN performed SVE and found pt to be 4-5/80/0. Fluid bolus and consents for Epidural were started and MDA paged to perform epidural. At 1347, Dr Goncalves completed the Epidural, pt was laid in left lateral position. Rodger was continuing to feel very strong, painful contractions and vocalizing thru them. At 13:55 Pt was involuntarily pushing, RN assessed cervix, pt found to be complete +1. RN called for assistance and in house MD and Dr. Calabrese to be paged. 1359: Dr. Dukes at bedside as in house MD 1400: Dr. Dukes SVE 10/100/+2 1404: Dr. Calabrese at bedside for delivery, active pushing started. 1414: infant delivered.

## 2023-02-13 NOTE — H&P
31 yo  who presents at 39+1 for non medical IOL    GBS neg  RH pos  EFW 7.5lbs    Cat 1 tracing  0 contractions  60/1-2/-2 AROM blood but no significant fluid seen    Plan pitocin IOL  Expect   Epidural prn

## 2023-02-13 NOTE — PLAN OF CARE
Writer received a call from [facility] to notify that the patient blood had come back positive for antibodies when running the type and screen. Sample will have to be sent out to the blood bank, results will be pending. Currently lab advises writer to be aware that blood products wouldn't be readily available for this patient.

## 2023-02-13 NOTE — PROVIDER NOTIFICATION
02/13/23 1612   Provider Notification   Provider Name/Title Dr TRE Calabrese   Method of Notification Phone   Request Evaluate - Remote   Notification Reason Status Update     MD updated re: scant flow on last fundal check after TXA infusion. TORB to infuse another bag of pitocin at 100ml/hr until finished.

## 2023-02-13 NOTE — ANESTHESIA PREPROCEDURE EVALUATION
Anesthesia Pre-Procedure Evaluation    Patient: Allison J Bazinet   MRN: [id number] : 1990        Procedure : * No procedures listed *          Past Medical History:   Diagnosis Date     History of chicken pox      IUD (intrauterine device) in place 2014    has appt for replacement 2019 with [facility]       Migraine with aura and without status migrainosus, not intractable 11/15/2018     PIH (pregnancy induced hypertension) 2020     PNA (pneumonia) 2016    LLL     PPD screening test     Test was negative, she had exposure in [place]     Thyroid disease     Pregnacy induced      Past Surgical History:   Procedure Laterality Date     LUMPECTOMY BREAST Right     Benign, done in [place]     wisdom teeth extraction - all 4         No Known Allergies   Social History     Tobacco Use     Smoking status: Never     Smokeless tobacco: Never   Substance Use Topics     Alcohol use: Not Currently      Wt Readings from Last 1 Encounters:   23 74.4 kg (164 lb)        Anesthesia Evaluation            ROS/MED HX  ENT/Pulmonary:  - neg pulmonary ROS     Neurologic:       Cardiovascular:  - neg cardiovascular ROS     METS/Exercise Tolerance:     Hematologic:       Musculoskeletal:       GI/Hepatic:       Renal/Genitourinary:       Endo:       Psychiatric/Substance Use:       Infectious Disease:       Malignancy:       Other:            Physical Exam    Airway        Mallampati: II   TM distance: > 3 FB   Neck ROM: full     Respiratory Devices and Support         Dental           Cardiovascular             Pulmonary                   OUTSIDE LABS:  CBC:   Lab Results   Component Value Date    WBC 12.6 (H) 2023    WBC 8.3 2022    HGB 11.1 (L) 2023    HGB 13.0 2022    HCT 34.6 (L) 2023    HCT 38.7 2022     2023     2022     BMP:   Lab Results   Component Value Date     2023     2020    POTASSIUM 3.7 2023    POTASSIUM  3.9 08/13/2020    CHLORIDE 101 01/09/2023    CHLORIDE 107 08/13/2020    CO2 23 01/09/2023    CO2 21 08/13/2020    BUN 8.4 01/09/2023    BUN 11 08/13/2020    CR 0.63 01/09/2023    CR 0.92 08/16/2020    GLC 81 01/09/2023     (H) 08/13/2020     COAGS: No results found for: PTT, INR, FIBR  POC: No results found for: BGM, HCG, HCGS  HEPATIC:   Lab Results   Component Value Date    ALBUMIN 3.6 01/09/2023    PROTTOTAL 6.9 01/09/2023    ALT 10 01/09/2023    AST 20 01/09/2023    ALKPHOS 83 01/09/2023    BILITOTAL <0.2 01/09/2023     OTHER:   Lab Results   Component Value Date    XOCHITL 9.1 01/09/2023    CRP <2.9 03/09/2022    SED 14 03/09/2022       Anesthesia Plan    ASA Status:  2      Anesthesia Type: Epidural.              Consents    Anesthesia Plan(s) and associated risks, benefits, and realistic alternatives discussed. Questions answered and patient/representative(s) expressed understanding.    - Discussed:     - Discussed with:  Patient         Postoperative Care            Comments:           neg OB ROS.       Sunny Goncalves MD

## 2023-02-14 ENCOUNTER — LACTATION ENCOUNTER (OUTPATIENT)
Age: 33
End: 2023-02-14

## 2023-02-14 VITALS
RESPIRATION RATE: 16 BRPM | TEMPERATURE: 98 F | BODY MASS INDEX: 26.36 KG/M2 | HEIGHT: 66 IN | WEIGHT: 164 LBS | OXYGEN SATURATION: 97 % | DIASTOLIC BLOOD PRESSURE: 72 MMHG | SYSTOLIC BLOOD PRESSURE: 115 MMHG | HEART RATE: 70 BPM

## 2023-02-14 PROCEDURE — 250N000013 HC RX MED GY IP 250 OP 250 PS 637: Performed by: OBSTETRICS & GYNECOLOGY

## 2023-02-14 RX ADMIN — IBUPROFEN 800 MG: 800 TABLET, FILM COATED ORAL at 05:00

## 2023-02-14 RX ADMIN — ACETAMINOPHEN 650 MG: 325 TABLET ORAL at 14:57

## 2023-02-14 RX ADMIN — IBUPROFEN 800 MG: 800 TABLET, FILM COATED ORAL at 11:13

## 2023-02-14 RX ADMIN — DOCUSATE SODIUM 100 MG: 100 CAPSULE, LIQUID FILLED ORAL at 09:31

## 2023-02-14 RX ADMIN — ACETAMINOPHEN 650 MG: 325 TABLET ORAL at 00:48

## 2023-02-14 RX ADMIN — ACETAMINOPHEN 650 MG: 325 TABLET ORAL at 09:31

## 2023-02-14 RX ADMIN — ACETAMINOPHEN 650 MG: 325 TABLET ORAL at 05:00

## 2023-02-14 ASSESSMENT — ACTIVITIES OF DAILY LIVING (ADL)
ADLS_ACUITY_SCORE: 20

## 2023-02-14 NOTE — DISCHARGE INSTRUCTIONS
Postpartum Vaginal Delivery Instructions    Activity     Ask family and friends for help when you need it.  Do not place anything in your vagina for 6 weeks.  You are not restricted on other activities, but take it easy for a few weeks to allow your body to recover from delivery.  You are able to do any activities you feel up to that point.  No driving until you have stopped taking your pain medications (usually two weeks after delivery).     Call your health care provider if you have any of these symptoms:     Increased pain, swelling, redness, or fluid around your stiches from an episiotomy or perineal tear.  A fever above 100.4 F (38 C) with or without chills when placing a thermometer under your tongue.  You soak a sanitary pad with blood within 1 hour, or you see blood clots larger than a golf ball.  Bleeding that lasts more than 6 weeks.  Vaginal discharge that smells bad.  Severe pain, cramping or tenderness in your lower belly area.  A need to urinate more frequently (use the toilet more often), more urgently (use the toilet very quickly), or it burns when you urinate.  Nausea and vomiting.  Redness, swelling or pain around a vein in your leg.  Problems breastfeeding or a red or painful area on your breast.  Chest pain and cough or are gasping for air.  Problems coping with sadness, anxiety, or depression.  If you have any concerns about hurting yourself or the baby, call your provider immediately.   You have questions or concerns after you return home.     Keep your hands clean:  Always wash your hands before touching your perineal area and stitches.  This helps reduce your risk of infection.  If your hands aren't dirty, you may use an alcohol hand-rub to clean your hands. Keep your nails clean and short.        You should be on pelvic rest with no heavy lifting >30 lb for 6 weeks.  Please call or return if you have fever >/= 100.4 degrees Farenheit (38.0 degrees Celsius), severe worsening abdominal pain  not relieved by oral pain medications, heavy persistent vaginal bleeding, chest pain, palpitations, shortness of breath, dizziness, depressed mood, or for any other major concern.  You may use over the counter ibuprofen (600 mg every 6 hours) and tylenol (500-650 mg every 6 hours) as needed for pain.  You may also use stool softener (Colace/Docusate 100 mg 1-2 tabs per day) as needed for constipation.  These are safe with breastfeeding.    Please call the office to schedule a postpartum follow-up appointment in 2 weeks and a final postpartum examination in 6 weeks, or sooner if instructed so by your physician.  If you are feeling well and have had no issues with blood pressures, then your 2 week follow-up appointment can be a telehealth/virtual appointment. If you had gestational diabetes during pregnancy, then you must also schedule a 2 hour glucose test during your final 6 week postpartum examination.

## 2023-02-14 NOTE — ANESTHESIA POSTPROCEDURE EVALUATION
Patient: Allison J Bazinet    Procedure: * No procedures listed *       Anesthesia Type:  Epidural    Note:     Postop Pain Control:    PONV:    Neuro/Psych:    Airway/Respiratory:    CV/Hemodynamics:    Other NRE:    DID A NON-ROUTINE EVENT OCCUR?     Event details/Postop Comments:      S/P epidural for labor.   I or my partner was immediately available for management of this patient during epidural analgesia infusion.  VSS.  Doing well. Block resolved.  Neuro at baseline. Denies positional headache. Minimal side effects easily managed w/ PRN meds. No apparent anesthetic complications. No follow-up required.    Robi Zhang MD             Last vitals:  Vitals:    02/13/23 1627 02/13/23 2040 02/14/23 0044   BP: 108/58 129/75 123/71   Pulse:  67 57   Resp:  17 18   Temp:  97.6  F (36.4  C) 97.8  F (36.6  C)   SpO2:          Electronically Signed By: Robi Zhang MD  February 14, 2023  8:02 AM

## 2023-02-14 NOTE — L&D DELIVERY NOTE
Delivery Date: 2023    LABOR COURSE:  The patient is an otherwise healthy 32-year-old  2, para 1 female who presented at 39 weeks' gestation for a nonmedical induction of labor.  Upon admission, her cervix was 60% effaced, 1-2 cm dilated at -2 station and rupture of membranes was performed for clear fluid.  She was started on Pitocin at that time.    She was group B strep negative.  A category 1 tracing was observed.  Her blood type was Rh positive.    Her labor progressed in a normal fashion.  She did receive an epidural for pain control.  Upon reaching complete dilation, she had a 15-minute second stage for a spontaneous vaginal delivery over an intact perineum of a 7 pound 5 ounce female infant with Apgars 8 and 9.  Delayed cord clamping was performed.    There was a second-degree midline perineal laceration that was repaired with 3-0 Vicryl suture.  Quantitative blood loss was 364 mL. Both infant and mother did well following delivery.    Angelica Calabrese MD        D: 2023   T: 2023   MT: [facility]    Name:     BAZINET, ALLISON JSharon  N:      [id number]        Account:       [id number]   :      1990           Delivery Date: 2023     Document: [id number]

## 2023-02-14 NOTE — PLAN OF CARE
Data: Vital signs within normal limits. Postpartum checks within normal limits - see flow record. Patient eating and drinking normally. Patient able to empty bladder independently and is up ambulating. No apparent signs of infection. Patient performing self cares and is able to care for infant.  Action: Patient medicated during the shift for pain and cramping. See MAR. Patient reassessed within 1 hour after each medication and pain was improved - patient stated she was comfortable. Patient discharge education completed, no further question. See flow record.  Response: Positive attachment behaviors observed with infant. Support person present.   Plan: Anticipate discharge this afternoon.

## 2023-02-14 NOTE — LACTATION NOTE
This note was copied from a baby's chart.  Lactation visit prior to discharge; this is Audrey's second baby.  Audrey states infant has been latching better this afternoon and had previously been hand expressing prior to feeds to entice infant, but has no concerns or questions at this time.  Reviewed benefits of hand expressing to maximize milk production and reviewed outpatient lactation resources.  Audrey states she has breast pump at home.

## 2023-02-14 NOTE — PLAN OF CARE
VSS on room air. Up ad chris. Voiding without difficulty. Lochia scant, no clots. Fundus firm and midline. Pain reported at perineum that radiates to her posterior back. Reported minimal at rest but increases to 3-4 with activity, well managed with PRN tylenol and ibuprofen. Using ice, medicated pads and shane bottle for perineum incision. Breastfeeding, tolerating well. FOB very supportive and at bedside. Bonding well with infant. Positive attachment observed.

## 2023-02-14 NOTE — PROGRESS NOTES
"February 14, 2023      SUBJECTIVE: No acute overnight events.  Mild abdominal cramping. +Lochia light.  Tolerating PO. Ambulating/urinating w/o difficulty.  Denies CP/palp/SOB/LH.    OBJECTIVE:  /71 (BP Location: Left arm, Patient Position: Sitting, Cuff Size: Adult Regular)   Pulse 57   Temp 97.8  F (36.6  C) (Oral)   Resp 18   Ht 1.676 m (5' 6\")   Wt 74.4 kg (164 lb)   LMP 04/13/2022 (Approximate)   SpO2 97%   Breastfeeding Unknown   BMI 26.47 kg/m    Gen: NAD, A&O x 3  Abd: Uterus firm, contracted, NT  Ext: no edema BL LEs, symmetric, no CT    Hemoglobin   Date Value Ref Range Status   01/09/2023 11.1 (L) 11.7 - 15.7 g/dL Final   03/09/2022 13.0 11.7 - 15.7 g/dL Final   08/18/2020 9.5 (L) 11.7 - 15.7 g/dL Final   08/16/2020 10.4 (L) 11.7 - 15.7 g/dL Final   ]    A/P: PPD#1 s/p normal vaginal delivery.  Doing well.  Afebrile, VSS.  - ibuprofen, tylenol prn pain  - breastfeeding  - regular diet as tolerated  - routine postpartum care  - possible home later this afternoon if baby clear for discharge  - f/u in 2 and 6 weeks postpartum      JAVIER NICHOLE MD    "

## 2023-02-18 LAB
PERFORMING LABORATORY: NORMAL
SPECIMEN STATUS: NORMAL
TEST NAME: NORMAL

## 2023-03-29 DIAGNOSIS — F51.05 INSOMNIA SECONDARY TO ANXIETY: ICD-10-CM

## 2023-03-29 DIAGNOSIS — F41.9 INSOMNIA SECONDARY TO ANXIETY: ICD-10-CM

## 2023-03-29 DIAGNOSIS — F41.9 ANXIETY: ICD-10-CM

## 2023-03-30 RX ORDER — CITALOPRAM HYDROBROMIDE 10 MG/1
TABLET ORAL
Qty: 180 TABLET | Refills: 0 | Status: SHIPPED | OUTPATIENT
Start: 2023-03-30 | End: 2023-04-19 | Stop reason: DRUGHIGH

## 2023-03-30 NOTE — TELEPHONE ENCOUNTER
"Please help patient schedule \"Return in about 6 months (around 11/5/2022) for depression/anxiety follow up, in person, w/ Dr. GILL \"  "

## 2023-03-31 ENCOUNTER — MYC MEDICAL ADVICE (OUTPATIENT)
Dept: FAMILY MEDICINE | Facility: CLINIC | Age: 33
End: 2023-03-31
Payer: COMMERCIAL

## 2023-03-31 NOTE — TELEPHONE ENCOUNTER
Left message to call back. When patient calls back please help her schedule an in-person depression/anxiety f/u with  My chart message sent to patient as well regarding this.

## 2023-04-10 ENCOUNTER — TRANSFERRED RECORDS (OUTPATIENT)
Dept: MULTI SPECIALTY CLINIC | Facility: CLINIC | Age: 33
End: 2023-04-10
Payer: COMMERCIAL

## 2023-04-10 LAB — PAP SMEAR - HIM PATIENT REPORTED: NEGATIVE

## 2023-04-12 ASSESSMENT — ANXIETY QUESTIONNAIRES
4. TROUBLE RELAXING: NOT AT ALL
1. FEELING NERVOUS, ANXIOUS, OR ON EDGE: NOT AT ALL
2. NOT BEING ABLE TO STOP OR CONTROL WORRYING: NOT AT ALL
6. BECOMING EASILY ANNOYED OR IRRITABLE: SEVERAL DAYS
GAD7 TOTAL SCORE: 1
7. FEELING AFRAID AS IF SOMETHING AWFUL MIGHT HAPPEN: NOT AT ALL
3. WORRYING TOO MUCH ABOUT DIFFERENT THINGS: NOT AT ALL
5. BEING SO RESTLESS THAT IT IS HARD TO SIT STILL: NOT AT ALL
IF YOU CHECKED OFF ANY PROBLEMS ON THIS QUESTIONNAIRE, HOW DIFFICULT HAVE THESE PROBLEMS MADE IT FOR YOU TO DO YOUR WORK, TAKE CARE OF THINGS AT HOME, OR GET ALONG WITH OTHER PEOPLE: NOT DIFFICULT AT ALL

## 2023-04-12 NOTE — TELEPHONE ENCOUNTER
Refill Decision Note   Navjot Segovia  is requesting a refill authorization.  Brief Assessment and Rationale for Refill:  Approve     Medication Therapy Plan:       Medication Reconciliation Completed: No   Comments:     No Care Gaps recommended.     Note composed:10:10 AM 04/12/2023           Routing to PCP to review.    Crow ROMERO RN   [facility] [place] Triage

## 2023-04-19 ENCOUNTER — VIRTUAL VISIT (OUTPATIENT)
Dept: FAMILY MEDICINE | Facility: CLINIC | Age: 33
End: 2023-04-19
Payer: COMMERCIAL

## 2023-04-19 DIAGNOSIS — Z30.430 ENCOUNTER FOR INSERTION OF MIRENA IUD: ICD-10-CM

## 2023-04-19 DIAGNOSIS — F51.05 INSOMNIA SECONDARY TO ANXIETY: ICD-10-CM

## 2023-04-19 DIAGNOSIS — F41.9 INSOMNIA SECONDARY TO ANXIETY: ICD-10-CM

## 2023-04-19 DIAGNOSIS — F41.9 ANXIETY: Primary | ICD-10-CM

## 2023-04-19 PROBLEM — Z97.5 IUD (INTRAUTERINE DEVICE) IN PLACE: Status: RESOLVED | Noted: 2018-11-15 | Resolved: 2023-04-19

## 2023-04-19 PROCEDURE — 99213 OFFICE O/P EST LOW 20 MIN: CPT | Mod: VID | Performed by: FAMILY MEDICINE

## 2023-04-19 RX ORDER — CITALOPRAM HYDROBROMIDE 20 MG/1
20 TABLET ORAL DAILY
Qty: 90 TABLET | Refills: 1 | Status: SHIPPED | OUTPATIENT
Start: 2023-04-19 | End: 2023-10-18

## 2023-04-19 ASSESSMENT — ANXIETY QUESTIONNAIRES
8. IF YOU CHECKED OFF ANY PROBLEMS, HOW DIFFICULT HAVE THESE MADE IT FOR YOU TO DO YOUR WORK, TAKE CARE OF THINGS AT HOME, OR GET ALONG WITH OTHER PEOPLE?: NOT DIFFICULT AT ALL
7. FEELING AFRAID AS IF SOMETHING AWFUL MIGHT HAPPEN: NOT AT ALL
3. WORRYING TOO MUCH ABOUT DIFFERENT THINGS: NOT AT ALL
5. BEING SO RESTLESS THAT IT IS HARD TO SIT STILL: NOT AT ALL
6. BECOMING EASILY ANNOYED OR IRRITABLE: NOT AT ALL
4. TROUBLE RELAXING: NOT AT ALL
2. NOT BEING ABLE TO STOP OR CONTROL WORRYING: NOT AT ALL
IF YOU CHECKED OFF ANY PROBLEMS ON THIS QUESTIONNAIRE, HOW DIFFICULT HAVE THESE PROBLEMS MADE IT FOR YOU TO DO YOUR WORK, TAKE CARE OF THINGS AT HOME, OR GET ALONG WITH OTHER PEOPLE: NOT DIFFICULT AT ALL
GAD7 TOTAL SCORE: 1
GAD7 TOTAL SCORE: 1
5. BEING SO RESTLESS THAT IT IS HARD TO SIT STILL: NOT AT ALL
7. FEELING AFRAID AS IF SOMETHING AWFUL MIGHT HAPPEN: NOT AT ALL
2. NOT BEING ABLE TO STOP OR CONTROL WORRYING: NOT AT ALL
1. FEELING NERVOUS, ANXIOUS, OR ON EDGE: NOT AT ALL
7. FEELING AFRAID AS IF SOMETHING AWFUL MIGHT HAPPEN: NOT AT ALL
4. TROUBLE RELAXING: NOT AT ALL
6. BECOMING EASILY ANNOYED OR IRRITABLE: SEVERAL DAYS
1. FEELING NERVOUS, ANXIOUS, OR ON EDGE: NOT AT ALL
GAD7 TOTAL SCORE: 0
GAD7 TOTAL SCORE: 1
GAD7 TOTAL SCORE: 0
IF YOU CHECKED OFF ANY PROBLEMS ON THIS QUESTIONNAIRE, HOW DIFFICULT HAVE THESE PROBLEMS MADE IT FOR YOU TO DO YOUR WORK, TAKE CARE OF THINGS AT HOME, OR GET ALONG WITH OTHER PEOPLE: NOT DIFFICULT AT ALL
3. WORRYING TOO MUCH ABOUT DIFFERENT THINGS: NOT AT ALL

## 2023-04-19 ASSESSMENT — PATIENT HEALTH QUESTIONNAIRE - PHQ9
10. IF YOU CHECKED OFF ANY PROBLEMS, HOW DIFFICULT HAVE THESE PROBLEMS MADE IT FOR YOU TO DO YOUR WORK, TAKE CARE OF THINGS AT HOME, OR GET ALONG WITH OTHER PEOPLE: NOT DIFFICULT AT ALL
SUM OF ALL RESPONSES TO PHQ QUESTIONS 1-9: 0
SUM OF ALL RESPONSES TO PHQ QUESTIONS 1-9: 0

## 2023-04-19 NOTE — PROGRESS NOTES
"Rodger is a 32 year old who is being evaluated via a billable video visit.      How would you like to obtain your AVS? MyChart  If the video visit is dropped, the invitation should be resent by: Text to cell phone: [phone]  Will anyone else be joining your video visit? No        Assessment & Plan       ICD-10-CM    1. Anxiety - more social anxiety   F41.9 citalopram (CELEXA) 20 MG tablet      2. Insomnia secondary to anxiety  F41.9 citalopram (CELEXA) 20 MG tablet    F51.05       3. Encounter for insertion of mirena IUD- at postpartum exam 4/2023 with ob/gyn specialists - had pap as well   Z30.430            Prescription drug management  15 minutes spent by me on the date of the encounter doing chart review, history and exam, documentation and further activities per the note       BMI:   Estimated body mass index is 26.47 kg/m  as calculated from the following:    Height as of 2/13/23: 1.676 m (5' 6\").    Weight as of 2/13/23: 74.4 kg (164 lb).   Weight management plan: Discussed healthy diet and exercise guidelines    MEDICATIONS:  Continue current medications without change  Work on weight loss  Regular exercise  See Patient Instructions    Adriana Acuña MD  [facility] [place]    Subjective :   Rodger is a 32 year old, presenting for the following health issues:  Recheck Medication  breastfeeding her 9 week old daughter, Kady.     Mood has been really good.  No signs of postpartum depression or anxiety.   Goes back to work in 3-4 weeks.      Taking 2-10mg citalopram tabs daily.  Takes edge off really well.  Would like to stick with total 20mg daily - discussed changing to 20mg tab . Pt would like to do that.     History of Present Illness       Mental Health Follow-up:  Patient presents to follow-up on Anxiety.    Patient's anxiety since last visit has been:  Good  The patient is not having other symptoms associated with anxiety.  Any significant life events: other  Patient is not feeling " anxious or having panic attacks.  Patient has no concerns about alcohol or drug use.    She eats 2-3 servings of fruits and vegetables daily.She consumes 0 sweetened beverage(s) daily.She exercises with enough effort to increase her heart rate 30 to 60 minutes per day.  She exercises with enough effort to increase her heart rate 3 or less days per week.   She is taking medications regularly.    Today's PHQ-9         PHQ-9 Total Score: 0    PHQ-9 Q9 Thoughts of better off dead/self-harm past 2 weeks :   Not at all    How difficult have these problems made it for you to do your work, take care of things at home, or get along with other people: Not difficult at all         4/19/2023     9:35 AM   Last PHQ-9   1.  Little interest or pleasure in doing things 0   2.  Feeling down, depressed, or hopeless 0   3.  Trouble falling or staying asleep, or sleeping too much 0   4.  Feeling tired or having little energy 0   5.  Poor appetite or overeating 0   6.  Feeling bad about yourself 0   7.  Trouble concentrating 0   8.  Moving slowly or restless 0   Q9: Thoughts of better off dead/self-harm past 2 weeks 0   PHQ-9 Total Score 0         4/19/2023     9:36 AM   GOOD-7    1. Feeling nervous, anxious, or on edge 0   2. Not being able to stop or control worrying 0   3. Worrying too much about different things 0   4. Trouble relaxing 0   5. Being so restless that it is hard to sit still 0   6. Becoming easily annoyed or irritable 1   7. Feeling afraid, as if something awful might happen 0   GOOD-7 Total Score 1   If you checked any problems, how difficult have they made it for you to do your work, take care of things at home, or get along with other people? Not difficult at all             Review of Systems :   Constitutional, HEENT, cardiovascular, pulmonary, GI, , musculoskeletal, neuro, skin, endocrine and psych systems are negative, except as otherwise noted.      Objective  :      Vitals:  No vitals were obtained today due to  virtual visit.     Physical Exam :    GENERAL: Healthy, alert and no distress  EYES: Eyes grossly normal to inspection.  No discharge or erythema, or obvious scleral/conjunctival abnormalities.  RESP: No audible wheeze, cough, or visible cyanosis.  No visible retractions or increased work of breathing.    SKIN: Visible skin clear. No significant rash, abnormal pigmentation or lesions.  NEURO: Cranial nerves grossly intact.  Mentation and speech appropriate for age.  PSYCH: Mentation appears normal, affect normal/bright, judgement and insight intact, normal speech and appearance well-groomed.    Admission on 02/13/2023, Discharged on 02/14/2023   Component Date Value Ref Range Status     Treponema Antibody Total 02/13/2023 Nonreactive  Nonreactive Final     ABO/RH(D) 02/13/2023 A POS   Final     Antibody Screen 02/13/2023 Positive (A)  Negative Final     SPECIMEN EXPIRATION DATE 02/13/2023 [id number]   Final     Hold Specimen 02/13/2023 [facility]   Final     Group B Streptococcus (External) 01/24/2023 Negative  Negative Final     HIV 1&2 Antibody (External) 08/02/2022 Negative  Nonreactive Final     Rubella Antibody IgG (External) 08/02/2022 Immune  Nonreactive Final     VDRL (Syphilis) (External) 08/02/2022 Nonreactive  Nonreactive Final     Hepatitis B Surface Antigen (Exter* 08/02/2022 Negative  Nonreactive Final     See Scanned Result 02/13/2023 See Scanned Result   Final     Performing Laboratory 02/13/2023 [facility]   Final     Test Name 02/13/2023 Reference Lab Workup   Final     Blood Component Type 02/13/2023 Red Blood Cells   Preliminary     Product Code 02/13/2023 [id number]   Preliminary     Unit Status 02/13/2023 Released   Preliminary     Unit Number 02/13/2023 [id number]   Preliminary     CROSSMATCH 02/13/2023 COMPATIBLE   Preliminary     CODING SYSTEM 02/13/2023 [id number]   Preliminary     UNIT ABO/RH 02/13/2023 A+   Preliminary     UNIT TYPE ISBT 02/13/2023 6200   Preliminary     Blood  Component Type 02/13/2023 Red Blood Cells   Preliminary     Product Code 02/13/2023 [id number]   Preliminary     Unit Status 02/13/2023 Released   Preliminary     Unit Number 02/13/2023 [id number]   Preliminary     CROSSMATCH 02/13/2023 COMPATIBLE   Preliminary     CODING SYSTEM 02/13/2023 [id number]   Preliminary     UNIT ABO/RH 02/13/2023 A+   Preliminary     UNIT TYPE ISBT 02/13/2023 6200   Preliminary               Video-Visit Details    Type of service:  Video Visit   Start time: 12:29pm  End time: 12:39pm.     Originating Location (pt. Location): Home  Distant Location (provider location):  On-site  Platform used for Video Visit: Zabrina

## 2023-04-19 NOTE — PATIENT INSTRUCTIONS
[facility]  [address] [place] [id number]  Office: [phone]   Fax:    [phone]     Return in about 6 months (around 10/19/2023) for depression/anxiety follow up in person, w/ Dr. ARIAS for 20 min. Appt; 40 minutes, if multiple problems .

## 2023-04-19 NOTE — Clinical Note
Please abstract the following data from this visit with this patient into the appropriate field in Epic:  Tests that can be patient reported without a hard copy:  Pap smear done on this date: 4/10/2023 (approximately), by this group: ob/gyn specialists , results were normal .   Other Tests found in the patient's chart through Chart Review/Care Everywhere:    Note to Abstraction: If this section is blank, no results were found via Chart Review/Care Everywhere.

## 2023-05-17 ENCOUNTER — MYC MEDICAL ADVICE (OUTPATIENT)
Dept: FAMILY MEDICINE | Facility: CLINIC | Age: 33
End: 2023-05-17
Payer: COMMERCIAL

## 2023-05-17 DIAGNOSIS — G43.109 MIGRAINE WITH AURA AND WITHOUT STATUS MIGRAINOSUS, NOT INTRACTABLE: Primary | ICD-10-CM

## 2023-05-23 RX ORDER — SUMATRIPTAN 6 MG/.5ML
6 INJECTION, SOLUTION SUBCUTANEOUS ONCE
Qty: 1 ML | Refills: 11 | Status: SHIPPED | OUTPATIENT
Start: 2023-05-23 | End: 2023-05-23

## 2023-06-04 ENCOUNTER — HEALTH MAINTENANCE LETTER (OUTPATIENT)
Age: 33
End: 2023-06-04

## 2023-08-02 ENCOUNTER — MYC MEDICAL ADVICE (OUTPATIENT)
Dept: FAMILY MEDICINE | Facility: CLINIC | Age: 33
End: 2023-08-02
Payer: COMMERCIAL

## 2023-08-10 NOTE — TELEPHONE ENCOUNTER
"From last virtual visit with me : \"Return in about 6 months (around 10/19/2023) for depression/anxiety follow up in person, w/ Dr. ARIAS for 20 min. appt, 40 minutes, if multiple problems . \"    Please offer pt an inperson visit in mid 10/2023- ok to use 2-20 minute virtual spots together per my ok.    "

## 2023-10-05 ENCOUNTER — OFFICE VISIT (OUTPATIENT)
Dept: FAMILY MEDICINE | Facility: CLINIC | Age: 33
End: 2023-10-05
Payer: COMMERCIAL

## 2023-10-05 VITALS
BODY MASS INDEX: 23.7 KG/M2 | OXYGEN SATURATION: 100 % | TEMPERATURE: 99.7 F | SYSTOLIC BLOOD PRESSURE: 98 MMHG | DIASTOLIC BLOOD PRESSURE: 60 MMHG | HEIGHT: 67 IN | HEART RATE: 137 BPM | WEIGHT: 151 LBS | RESPIRATION RATE: 18 BRPM

## 2023-10-05 DIAGNOSIS — J02.0 STREP THROAT: Primary | ICD-10-CM

## 2023-10-05 DIAGNOSIS — R50.9 FEVER, UNSPECIFIED FEVER CAUSE: ICD-10-CM

## 2023-10-05 LAB
DEPRECATED S PYO AG THROAT QL EIA: POSITIVE
SARS-COV-2 RNA RESP QL NAA+PROBE: NEGATIVE

## 2023-10-05 PROCEDURE — 87880 STREP A ASSAY W/OPTIC: CPT | Performed by: NURSE PRACTITIONER

## 2023-10-05 PROCEDURE — 99213 OFFICE O/P EST LOW 20 MIN: CPT | Performed by: NURSE PRACTITIONER

## 2023-10-05 PROCEDURE — 87635 SARS-COV-2 COVID-19 AMP PRB: CPT | Performed by: NURSE PRACTITIONER

## 2023-10-05 RX ORDER — PENICILLIN V POTASSIUM 500 MG/1
500 TABLET, FILM COATED ORAL 2 TIMES DAILY
Qty: 20 TABLET | Refills: 0 | Status: SHIPPED | OUTPATIENT
Start: 2023-10-05 | End: 2023-10-15

## 2023-10-05 NOTE — PATIENT INSTRUCTIONS
Strep Throat: Care Instructions  Your Care Instructions     Strep throat is a bacterial infection that causes sudden, severe sore throat and fever. Strep throat, which is caused by bacteria called streptococcus, is treated with antibiotics. Sometimes a strep test is necessary to tell if the sore throat is caused by strep bacteria. Treatment can help ease symptoms and may prevent future problems.  Follow-up care is a key part of your treatment and safety. Be sure to make and go to all appointments, and call your doctor if you are having problems. It's also a good idea to know your test results and keep a list of the medicines you take.  How can you care for yourself at home?  Take your antibiotics as directed. Do not stop taking them just because you feel better. You need to take the full course of antibiotics.  Strep throat can spread to others until 24 hours after you begin taking antibiotics. During this time, avoid contact with other people at work, school, or home, especially infants and children. Do not sneeze or cough on others, and wash your hands often. Keep your drinking glass and eating utensils separate from those of others. Wash these items well in hot, soapy water.  Gargle with warm salt water at least once each hour to help reduce swelling and make your throat feel better. Use 1 teaspoon of salt mixed in 8 fluid ounces of warm water.  Take an over-the-counter pain medication, such as acetaminophen (Tylenol), ibuprofen (Advil, Motrin), or naproxen (Aleve). Read and follow all instructions on the label.  Try an over-the-counter anesthetic throat spray or throat lozenges, which may help relieve throat pain.  Drink plenty of fluids. Fluids may help soothe an irritated throat. Hot fluids, such as tea or soup, may help your throat feel better.  Eat soft solids and drink plenty of clear liquids. Flavored ice pops, ice cream, scrambled eggs, sherbet, and gelatin dessert (such as Jell-O) may also soothe the  "throat.  Get lots of rest.  Do not smoke, and avoid secondhand smoke. If you need help quitting, talk to your doctor about stop-smoking programs and medicines. These can increase your chances of quitting for good.  Use a vaporizer or humidifier to add moisture to the air in your bedroom. Follow the directions for cleaning the machine.  When should you call for help?   Call your doctor now or seek immediate medical care if:    You have a new or higher fever.     You have a fever with a stiff neck or severe headache.     You have new or worse trouble swallowing.     Your sore throat gets much worse on one side.     Your pain becomes much worse on one side of your throat.   Watch closely for changes in your health, and be sure to contact your doctor if:    You are not getting better after 2 days (48 hours).     You do not get better as expected.   Where can you learn more?  Go to https://www.[employer].net/patiented  Enter K625 in the search box to learn more about \"Strep Throat: Care Instructions.\"  Current as of: March 1, 2023               Content Version: 13.7    8968-9837 [employer].   Care instructions adapted under license by your healthcare professional. If you have questions about a medical condition or this instruction, always ask your healthcare professional. [employer] disclaims any warranty or liability for your use of this information.      "

## 2023-10-05 NOTE — PROGRESS NOTES
Assessment & Plan     Strep throat  - penicillin V (VEETID) 500 MG tablet  Dispense: 20 tablet; Refill: 0    Fever  - Streptococcus A Rapid Screen w/Reflex to PCR - Clinic Collect  - Symptomatic COVID-19 Virus (Coronavirus) by PCR Nose        Carmen Dominguez CNP  [facility] [place] [place] Soares is a 32 year old, presenting for the following health issues:  Throat Problem        10/5/2023     7:41 AM   Additional Questions   Roomed by Chula LEARY       History of Present Illness       Reason for visit:  Sore throat  Symptom onset:  1-3 days ago  Symptoms include:  Sore red tonsils, sore lymph nodes around neck, chills, low fever, body aches  Symptom intensity:  Moderate  Symptom progression:  Improving  Had these symptoms before:  No  What makes it worse:  No  What makes it better:  Ibuprofen and tylenol    She eats 2-3 servings of fruits and vegetables daily.She consumes 0 sweetened beverage(s) daily.She exercises with enough effort to increase her heart rate 20 to 29 minutes per day.  She exercises with enough effort to increase her heart rate 4 days per week.   She is taking medications regularly.         Acute Illness  Acute illness concerns: Throat pain  Onset/Duration: x 2 days ago  Symptoms:  Fever: YES- 100.7 this morning - has been using ibuprofen   Chills/Sweats: YES  Headache (location?): YES  Sinus Pressure: No  Conjunctivitis:  No  Ear Pain: YES: bilateral  Rhinorrhea: No  Congestion: No  Sore Throat: YES  Cough: no  Wheeze: No  Decreased Appetite: YES  Nausea: No  Vomiting: No  Diarrhea: No  Dysuria/Freq.: No  Dysuria or Hematuria: No  Fatigue/Achiness: YES  Sick/Strep Exposure: YES- daughter was sick recently   Therapies tried and outcome: Ibuprofen, tylenol           Review of Systems   Constitutional, HEENT, cardiovascular, pulmonary, GI, , musculoskeletal, neuro, skin, endocrine and psych systems are negative, except as otherwise noted.      Objective    BP 98/60   Pulse (!)  "137   Temp 99.7  F (37.6  C)   Resp 18   Ht 1.702 m (5' 7\")   Wt 68.5 kg (151 lb)   SpO2 100%   BMI 23.65 kg/m    Body mass index is 23.65 kg/m .  Physical Exam   GENERAL: healthy, alert and no distress  EYES: Eyes grossly normal to inspection, PERRL and conjunctivae and sclerae normal  HENT: normal cephalic/atraumatic, ear canals and TM's normal, nose and mouth without ulcers or lesions, oral mucous membranes moist, tonsillar hypertrophy, and tonsillar erythema  NECK: bilateral anterior cervical adenopathy, no asymmetry, masses, or scars, and thyroid normal to palpation  CV: regular rate and rhythm, normal S1 S2, no S3 or S4, no murmur, click or rub, no peripheral edema and peripheral pulses strong    Results for orders placed or performed in visit on 10/05/23 (from the past 24 hour(s))   Streptococcus A Rapid Screen w/Reflex to PCR - Clinic Collect    Specimen: Throat; Swab   Result Value Ref Range    Group A Strep antigen Positive (A) Negative             VERONICA Serrano     [address] [address]  [email or web address]   Office: [phone]                     "

## 2023-10-07 ENCOUNTER — NURSE TRIAGE (OUTPATIENT)
Dept: NURSING | Facility: CLINIC | Age: 33
End: 2023-10-07
Payer: COMMERCIAL

## 2023-10-07 NOTE — TELEPHONE ENCOUNTER
Patient seen on Thursday and diagnosed with strep throat and started on antibiotic. Patient reports that the fever, body aches, and chills have resolved. Patient calling as sore throat is still bad and not improving. Patient has finished 48 hours of antibiotic. Sore throat is moderate, not severe. Patient using honey, ibuprofen, and has gargled warm salt water 2 times.     Protocol recommends home care.  Care advice given. If symptoms of sore throat not improved tomorrow patient will call back. If symptoms worsen patient will call back.  Ling Chen RN   10/07/23 8:44 AM  [facility] Nurse Advisor      Reason for Disposition   [1] Strep throat AND [2] taking an antibiotic   [1] Taking antibiotic < 72 hours (3 days) for strep throat AND [2] sore throat not improved    Additional Information   Negative: SEVERE difficulty breathing (e.g., struggling for each breath, speaks in single words, stridor)   Negative: Sounds like a life-threatening emergency to the triager   Negative: [1] Drooling or spitting out saliva (because can't swallow) AND [2] new-onset   Negative: Unable to open mouth completely   Negative: [1] Difficulty breathing AND [2] not severe   Negative: [1] Fever > 103 F (39.4 C) AND [2] taking antibiotic > 24 hours   Negative: [1] Drinking very little AND [2] dehydration suspected (e.g., no urine > 12 hours, very dry mouth, very lightheaded)   Negative: [1] Refuses to drink anything AND [2] for > 12 hours   Negative: Patient sounds very sick or weak to the triager   Negative: [1] Taking antibiotic > 24 hours for strep throat AND [2] sore throat pain is SEVERE   Negative: [1] Taking antibiotic > 48 hours (2 days) for strep throat AND [2] fever persists   Negative: [1] Taking antibiotic > 72 hours (3 days) for strep throat AND [2] sore throat not improved   Negative: [1] Taking antibiotics < 48 hours for strep throat AND [2] fever persists    Protocols used: Infection on Antibiotic Follow-up Call-A-,  Strep Throat Infection on Antibiotic Follow-up Call-A-AH

## 2023-10-15 ASSESSMENT — ANXIETY QUESTIONNAIRES
7. FEELING AFRAID AS IF SOMETHING AWFUL MIGHT HAPPEN: NOT AT ALL
6. BECOMING EASILY ANNOYED OR IRRITABLE: NOT AT ALL
1. FEELING NERVOUS, ANXIOUS, OR ON EDGE: NOT AT ALL
2. NOT BEING ABLE TO STOP OR CONTROL WORRYING: NOT AT ALL
4. TROUBLE RELAXING: NOT AT ALL
3. WORRYING TOO MUCH ABOUT DIFFERENT THINGS: NOT AT ALL
IF YOU CHECKED OFF ANY PROBLEMS ON THIS QUESTIONNAIRE, HOW DIFFICULT HAVE THESE PROBLEMS MADE IT FOR YOU TO DO YOUR WORK, TAKE CARE OF THINGS AT HOME, OR GET ALONG WITH OTHER PEOPLE: NOT DIFFICULT AT ALL
5. BEING SO RESTLESS THAT IT IS HARD TO SIT STILL: NOT AT ALL
GAD7 TOTAL SCORE: 0
GAD7 TOTAL SCORE: 0

## 2023-10-18 DIAGNOSIS — F41.9 ANXIETY: ICD-10-CM

## 2023-10-18 DIAGNOSIS — F51.05 INSOMNIA SECONDARY TO ANXIETY: ICD-10-CM

## 2023-10-18 DIAGNOSIS — F41.9 INSOMNIA SECONDARY TO ANXIETY: ICD-10-CM

## 2023-10-18 RX ORDER — CITALOPRAM HYDROBROMIDE 20 MG/1
20 TABLET ORAL DAILY
Qty: 90 TABLET | Refills: 1 | Status: SHIPPED | OUTPATIENT
Start: 2023-10-18 | End: 2023-10-19

## 2023-10-19 ENCOUNTER — OFFICE VISIT (OUTPATIENT)
Dept: FAMILY MEDICINE | Facility: CLINIC | Age: 33
End: 2023-10-19
Payer: COMMERCIAL

## 2023-10-19 VITALS
RESPIRATION RATE: 12 BRPM | HEIGHT: 67 IN | OXYGEN SATURATION: 97 % | SYSTOLIC BLOOD PRESSURE: 102 MMHG | WEIGHT: 146 LBS | TEMPERATURE: 97.3 F | DIASTOLIC BLOOD PRESSURE: 61 MMHG | BODY MASS INDEX: 22.91 KG/M2 | HEART RATE: 92 BPM

## 2023-10-19 DIAGNOSIS — F41.9 INSOMNIA SECONDARY TO ANXIETY: ICD-10-CM

## 2023-10-19 DIAGNOSIS — F41.9 ANXIETY: ICD-10-CM

## 2023-10-19 DIAGNOSIS — J03.01 ACUTE RECURRENT STREPTOCOCCAL TONSILLITIS: Primary | ICD-10-CM

## 2023-10-19 DIAGNOSIS — F51.05 INSOMNIA SECONDARY TO ANXIETY: ICD-10-CM

## 2023-10-19 DIAGNOSIS — J02.9 SORE THROAT: ICD-10-CM

## 2023-10-19 LAB — DEPRECATED S PYO AG THROAT QL EIA: POSITIVE

## 2023-10-19 PROCEDURE — 87880 STREP A ASSAY W/OPTIC: CPT | Performed by: FAMILY MEDICINE

## 2023-10-19 PROCEDURE — 99214 OFFICE O/P EST MOD 30 MIN: CPT | Performed by: FAMILY MEDICINE

## 2023-10-19 RX ORDER — AZITHROMYCIN 250 MG/1
TABLET, FILM COATED ORAL
Qty: 6 TABLET | Refills: 0 | Status: SHIPPED | OUTPATIENT
Start: 2023-10-19 | End: 2023-10-19

## 2023-10-19 RX ORDER — CITALOPRAM HYDROBROMIDE 20 MG/1
20 TABLET ORAL DAILY
Qty: 90 TABLET | Refills: 1 | Status: SHIPPED | OUTPATIENT
Start: 2023-10-19 | End: 2024-01-05

## 2023-10-19 RX ORDER — AZITHROMYCIN 250 MG/1
TABLET, FILM COATED ORAL
Qty: 6 TABLET | Refills: 0 | Status: SHIPPED | OUTPATIENT
Start: 2023-10-19 | End: 2023-12-20

## 2023-10-19 NOTE — PATIENT INSTRUCTIONS
" [facility]  [address] [place] [address]  Office: [phone]   Fax:    [phone]     Please, call our clinic or go to the ER immediately if signs or symptoms worsen or fail to improve as anticipated.     Thank you so much or choosing [facility]  for your Health Care. It was a pleasure seeing you at your visit today! Please contact us with any questions or concerns you may have.                   Adriana Acuña MD                              To reach your [facility] care team after hours call:   [phone] press #2 \"to speak with your care team\".  This will get you to our clinic instead of routing to central [facility]  scheduling.     PLEASE NOTE OUR HOURS HAVE CHANGED secondary to COVID-19 coronavirus pandemic, as we are trying to minimize patient exposure to the virus,  which is now widespread in the [facility].  These hours may change with very little notice.  We apologize for any inconvenience.       Our current clinic hours are:          Monday- Thursday   7:00am - 6:00pm  in person.      Friday  7:00am- 5:00pm                       Saturday and Sunday : Closed to in person and virtual visits        We have telephone and virtual visit times available between    7:00am - 6pm on Monday-Friday as well.                                                Phone:  [phone]      Our pharmacy hours: Monday through Friday 8:00am to 5:00pm                        Saturday - 9:00 am to 12 noon       Sunday : Closed.              Phone:  [phone]              ###  Please note: at this time we are not accepting any walk-in visits. ###      There is also information available at our web site:  [email or web address]    If your provider ordered any lab tests and you do not receive the results within 10 business days, please call the clinic.    If you need a medication refill please contact your pharmacy.  Please allow 3 " business days for your refill to be completed.    Our clinic offers telephone visits and e visits.  Please ask one of your team members to explain more.      Use [employer] (secure email communication and access to your chart) to send your primary care provider a message or make an appointment. Ask someone on your Team how to sign up for [employer].               Return in about 8 weeks (around 12/11/2023) for Wellness/Preventative Visit, w/ Dr. ARIAS for 40 min appt.    Future Appointments 10/19/2023 - 4/16/2024        Date Visit Type Length Department Provider     12/11/2023  1:20 PM [facility] PREVENTATIVE ADULT VISIT 40 min  FAMILY PRACTICE Adriana Acuña MD    Location Instructions:     [facility] is located at [address] [place] [address], along [address] Free parking is available; access the lot by turning north from [address] onto [address], then west onto [address] [facility] [address] [facility] [address].

## 2023-10-19 NOTE — PROGRESS NOTES
Assessment & Plan :       ICD-10-CM    1. Acute recurrent streptococcal tonsillitis  J03.01 azithromycin (ZITHROMAX) 250 MG tablet     DISCONTINUED: azithromycin (ZITHROMAX) 250 MG tablet      2. Anxiety - more social anxiety   F41.9 citalopram (CELEXA) 20 MG tablet      3. Insomnia secondary to anxiety  F41.9 citalopram (CELEXA) 20 MG tablet    F51.05       4. Sore throat  J02.9 Streptococcus A Rapid Screen w/Reflex to PCR - Clinic Collect        Offered pt ENT referral to see if candidate for tonsillectomy, but pt declined today.     Return in about 8 weeks (around 2023) for Wellness/Preventative Visit, w/ Dr. ARIAS for 40 min appt.     Ordering of each unique test  Prescription drug management         MEDICATIONS:   Orders Placed This Encounter   Medications    citalopram (CELEXA) 20 MG tablet     Sig: Take 1 tablet (20 mg) by mouth daily     Dispense:  90 tablet     Refill:  1    DISCONTD: azithromycin (ZITHROMAX) 250 MG tablet     Si tabs first day, then 1 tab by mouth daily for 4 additional days     Dispense:  6 tablet     Refill:  0    azithromycin (ZITHROMAX) 250 MG tablet     Si tabs first day, then 1 tab by mouth daily for 4 additional days     Dispense:  6 tablet     Refill:  0          - Continue other medications without change  See Patient Instructions         Adriana Acuña MD  [facility] PRIOR NIKOLAY Soares is a 32 year old, presenting for the following health issues:  Recheck Medication and Pharyngitis        10/19/2023     3:36 PM   Additional Questions   Roomed by [facility]   Accompanied by Self     10/05/2023 - had positive strep. Completed antibiotic - 3 days ago. Felt the antibiotic did not completely work.  Treated with PCN V 500mg bid x 10days.     Sore throat started again last night. Along with itching and popping ears. Same symptoms as last time. New is post nasal drip.    Daughter ? A carrier - asymptomatic - 3 yrs old - was treated at  same time as pt.   she      History of Present Illness       Mental Health Follow-up:  Patient presents to follow-up on Anxiety.    Patient's anxiety since last visit has been:  Good  The patient is not having other symptoms associated with anxiety.  Any significant life events: No  Patient is not feeling anxious or having panic attacks.  Patient has no concerns about alcohol or drug use.    She eats 2-3 servings of fruits and vegetables daily.She consumes 0 sweetened beverage(s) daily.She exercises with enough effort to increase her heart rate 20 to 29 minutes per day.  She exercises with enough effort to increase her heart rate 4 days per week.   She is taking medications regularly.         1/17/2022     8:12 AM 4/19/2023     9:35 AM 10/18/2023     3:42 PM   PHQ   PHQ-9 Total Score 3 0 0   Q9: Thoughts of better off dead/self-harm past 2 weeks Not at all Not at all Not at all         4/19/2023     9:35 AM 4/19/2023     9:36 AM 10/15/2023     8:14 PM   GOOD-7 SCORE   Total Score 0 (minimal anxiety) 1 (minimal anxiety) 0 (minimal anxiety)   Total Score 0 1 0           10/18/2023     3:42 PM   Last PHQ-9   1.  Little interest or pleasure in doing things 0   2.  Feeling down, depressed, or hopeless 0   3.  Trouble falling or staying asleep, or sleeping too much 0   4.  Feeling tired or having little energy 0   5.  Poor appetite or overeating 0   6.  Feeling bad about yourself 0   7.  Trouble concentrating 0   8.  Moving slowly or restless 0   Q9: Thoughts of better off dead/self-harm past 2 weeks 0   PHQ-9 Total Score 0         10/15/2023     8:14 PM   GOOD-7    1. Feeling nervous, anxious, or on edge 0   2. Not being able to stop or control worrying 0   3. Worrying too much about different things 0   4. Trouble relaxing 0   5. Being so restless that it is hard to sit still 0   6. Becoming easily annoyed or irritable 0   7. Feeling afraid, as if something awful might happen 0   GOOD-7 Total Score 0   If you checked any  problems, how difficult have they made it for you to do your work, take care of things at home, or get along with other people? Not difficult at all        Pre-Provider Visit Orders - Rapid Strep:   Does the patient have shortness of breath/trouble breathing, an earache, drooling/too much saliva, or any difficulty opening her mouth/moving neck?  No  Does the patient have a sore throat and either history of fever >100.4 in the previous 24 hours without a cough or recent exposure to a known case of strep throat? Yes     Patient Active Problem List   Diagnosis    Anxiety - more social anxiety     Abdominal bloating    Migraine with aura and without status migrainosus, not intractable- can't do estrogen/combination ocp's     Altitude sickness preventative measures    PIH (pregnancy induced hypertension)    Indication for care in labor or delivery    Vaginal delivery    Insomnia secondary to anxiety    Attention and concentration deficit- no previous dx of ADHD - would like to do testing     Normal labor and delivery    Encounter for insertion of mirena IUD- at postpartum exam 4/2023 with ob/gyn specialists - had pap as well     Acute recurrent streptococcal tonsillitis     Including meds rx'd today:   Current Outpatient Medications   Medication Sig Dispense Refill    azithromycin (ZITHROMAX) 250 MG tablet 2 tabs first day, then 1 tab by mouth daily for 4 additional days 6 tablet 0    citalopram (CELEXA) 20 MG tablet Take 1 tablet (20 mg) by mouth daily 90 tablet 1          Allergies   Allergen Reactions    Blood Transfusion Related (Informational Only) Other (See Comments)     Patient has a history of a clinically significant antibody against RBC antigens.  A delay in compatible RBCs may occur.          Review of Systems :   Constitutional, HEENT, cardiovascular, pulmonary, GI, , musculoskeletal, neuro, skin, endocrine and psych systems are negative, except as otherwise noted.      Objective    /61 (BP Location:  "Left arm, Patient Position: Chair, Cuff Size: Adult Large)   Pulse 92   Temp 97.3  F (36.3  C) (Tympanic)   Resp 12   Ht 1.702 m (5' 7\")   Wt 66.2 kg (146 lb)   SpO2 97%   BMI 22.87 kg/m    Body mass index is 22.87 kg/m .  Physical Exam   GENERAL: healthy, alert and no distress  EYES: Eyes grossly normal to inspection, PERRL and conjunctivae and sclerae normal  HENT: normal cephalic/atraumatic, ear canals and TM's normal, nose and mouth without ulcers or lesions, oropharynx clear, oral mucous membranes moist, 2+ bilateral tonsillar hypertrophy, and tonsillar erythema  NECK: bilateral anterior cervical adenopathy, no asymmetry, masses, or scars, and thyroid normal to palpation  RESP: lungs clear to auscultation - no rales, rhonchi or wheezes  CV: regular rate and rhythm, normal S1 S2, no S3 or S4, no murmur, click or rub, no peripheral edema and peripheral pulses strong  MS: no gross musculoskeletal defects noted, no edema  SKIN: no suspicious lesions or rashes  NEURO: Normal strength and tone, mentation intact and speech normal  PSYCH: mentation appears normal, affect normal/bright    Results for orders placed or performed in visit on 10/19/23 (from the past 24 hour(s))   Streptococcus A Rapid Screen w/Reflex to PCR - Clinic Collect    Specimen: Throat; Swab   Result Value Ref Range    Group A Strep antigen Positive (A) Negative                     "

## 2023-12-19 ENCOUNTER — E-VISIT (OUTPATIENT)
Dept: FAMILY MEDICINE | Facility: CLINIC | Age: 33
End: 2023-12-19
Payer: COMMERCIAL

## 2023-12-19 ENCOUNTER — MYC MEDICAL ADVICE (OUTPATIENT)
Dept: FAMILY MEDICINE | Facility: CLINIC | Age: 33
End: 2023-12-19
Payer: COMMERCIAL

## 2023-12-19 DIAGNOSIS — F41.9 ANXIETY: Primary | ICD-10-CM

## 2023-12-19 PROCEDURE — 99207 PR NON-BILLABLE SERV PER CHARTING: CPT | Performed by: FAMILY MEDICINE

## 2023-12-19 ASSESSMENT — ANXIETY QUESTIONNAIRES
2. NOT BEING ABLE TO STOP OR CONTROL WORRYING: NOT AT ALL
5. BEING SO RESTLESS THAT IT IS HARD TO SIT STILL: SEVERAL DAYS
7. FEELING AFRAID AS IF SOMETHING AWFUL MIGHT HAPPEN: NOT AT ALL
4. TROUBLE RELAXING: MORE THAN HALF THE DAYS
3. WORRYING TOO MUCH ABOUT DIFFERENT THINGS: NOT AT ALL
GAD7 TOTAL SCORE: 6
1. FEELING NERVOUS, ANXIOUS, OR ON EDGE: MORE THAN HALF THE DAYS
GAD7 TOTAL SCORE: 6
6. BECOMING EASILY ANNOYED OR IRRITABLE: SEVERAL DAYS

## 2023-12-20 ASSESSMENT — ANXIETY QUESTIONNAIRES: GAD7 TOTAL SCORE: 6

## 2023-12-20 NOTE — PATIENT INSTRUCTIONS
Thank you for choosing us for your care. I think a virtual video visit would be best next steps based on your symptoms. Please schedule a video visit appointment; you won t be charged for this eVisit.      You can schedule an appointment right here in [place], or call [phone]  .     To get to the [facility] directly, call clinic: [phone] then press #2 to speak with your care team, otherwise your call routes to central scheduling/triage  and you may have a longer call wait time.      Tell  here at our clinic - ok to use a held spot for me next week if no availability this week.     Thank you so much for choosing [facility].  Please contact us with any questions that you may have.   We appreciate the opportunity to serve you now and look forward to supporting your healthcare needs for a long time to come!    Most Sincerely,     Adriana Acuña MD    Learning About Anxiety Disorders  What are anxiety disorders?     Anxiety disorders are a type of medical problem. They cause severe anxiety. When you feel anxious, you feel that something bad is about to happen. This feeling interferes with your life.  These disorders include:  Generalized anxiety disorder. You feel worried and stressed about many everyday events and activities. This goes on for several months and disrupts your life on most days.  Panic disorder. You have repeated panic attacks. A panic attack is a sudden, intense fear or anxiety. It may make you feel short of breath. Your heart may pound.  Social anxiety disorder. You feel very anxious about what you will say or do in front of people. For example, you may be scared to talk or eat in public. This problem affects your daily life.  Phobias. You are very scared of a specific object, situation, or activity. For example, you may fear spiders, high places, or small spaces.  What are the symptoms?  Generalized anxiety disorder  Symptoms may  include:  Feeling worried and stressed about many things almost every day.  Feeling tired or irritable. You may have a hard time concentrating.  Having headaches or muscle aches.  Having a hard time getting to sleep or staying asleep.  Panic disorder  You may have repeated panic attacks when there is no reason for feeling afraid. You may change your daily activities because you worry that you will have another attack.  Symptoms may include:  Intense fear, terror, or anxiety.  Trouble breathing or very fast breathing.  Chest pain or tightness.  A heartbeat that races or is not regular.  Social anxiety disorder  Symptoms may include:  Fear about a social situation, such as eating in front of others or speaking in public. You may worry a lot. Or you may be afraid that something bad will happen.  Anxiety that can cause you to blush, sweat, and feel shaky.  A heartbeat that is faster than normal.  A hard time focusing.  Phobias  Symptoms may include:  More fear than most people of being around an object, being in a situation, or doing an activity. You might also be stressed about the chance of being around the thing you fear.  Worry about losing control, panicking, fainting, or having physical symptoms like a faster heartbeat when you are around the situation or object.  How are these disorders treated?  Anxiety disorders can be treated with medicines or counseling. A combination of both may be used.  Medicines may include:  Antidepressants. These may help your symptoms by keeping chemicals in your brain in balance.  Benzodiazepines. These may give you short-term relief of your symptoms.  Some people use cognitive-behavioral therapy. A therapist helps you learn to change stressful or bad thoughts into helpful thoughts.  Lead a healthy lifestyle  A healthy lifestyle may help you feel better.  Get at least 30 minutes of exercise on most days of the week. Walking is a good choice.  Eat a healthy diet. Include fruits,  "vegetables, lean proteins, and whole grains in your diet each day.  Try to go to bed at the same time every night. Try for 8 hours of sleep a night.  Find ways to manage stress. Try relaxation exercises.  Avoid alcohol and illegal drugs.  Follow-up care is a key part of your treatment and safety. Be sure to make and go to all appointments, and call your doctor if you are having problems. It's also a good idea to know your test results and keep a list of the medicines you take.  Where can you learn more?  Go to https://www.[employer].net/patiented  Enter K667 in the search box to learn more about \"Learning About Anxiety Disorders.\"  Current as of: June 25, 2023               Content Version: 13.8    8094-3369 [employer].   Care instructions adapted under license by your healthcare professional. If you have questions about a medical condition or this instruction, always ask your healthcare professional. [employer] disclaims any warranty or liability for your use of this information.      Generalized Anxiety Disorder: Care Instructions  Overview     We all worry. It's a normal part of life. But when you have generalized anxiety disorder, you worry about lots of things. You have a hard time not worrying. This worry or anxiety interferes with your relationships, work or school, and other areas of your life.  You may worry most days about things like money, health, work, or friends. That may make you feel tired, tense, or cranky. It can make it hard to think. It may get in the way of healthy sleep.  Counseling and medicine can both work to treat anxiety. They are often used together with lifestyle changes, such as getting enough sleep. Treatment can include a type of counseling called cognitive behavioral therapy, or CBT. It helps you notice and replace thoughts that make you worry. You also might have counseling along with those closest to you so that they can help.  Follow-up care is a key " "part of your treatment and safety. Be sure to make and go to all appointments, and call your doctor if you are having problems. It's also a good idea to know your test results and keep a list of the medicines you take.  How can you care for yourself at home?  Get at least 30 minutes of exercise on most days of the week. Walking is a good choice. You also may want to do other activities, such as running, swimming, cycling, or playing tennis or team sports.  Learn and do relaxation exercises, such as deep breathing.  Go to bed at the same time every night. Try for 8 to 10 hours of sleep a night.  Avoid alcohol, marijuana, and illegal drugs.  Find a counselor who uses cognitive behavioral therapy (CBT).  Don't isolate yourself. Let those closest to you help you. Find someone you can trust and confide in. Talk to that person.  Be safe with medicines. Take your medicines exactly as prescribed. Call your doctor if you think you are having a problem with your medicine.  Practice healthy thinking. How you think can affect how you feel and act. Ask yourself if your thoughts are helpful or unhelpful. If they are unhelpful, you can learn how to change them.  Recognize and accept your anxiety. When you feel anxious, say to yourself, \"This is not an emergency. I feel uncomfortable, but I am not in danger. I can keep going even if I feel anxious.\"  When should you call for help?   Call 911  anytime you think you may need emergency care. For example, call if:    You feel you can't stop from hurting yourself or someone else.   Where to get help 24 hours a day, 7 days a week   If you or someone you know talks about suicide, self-harm, a mental health crisis, a substance use crisis, or any other kind of emotional distress, get help right away. You can:    Call the Suicide and Crisis Lifeline at 921.     Call 9-864-389-TALK (1-315.314.4810).     Text HOME to 931432 to access the Crisis Text Line.   Consider saving these numbers in " "your phone.  Go to [employer] for more information or to chat online.  Call your doctor or counselor now or seek immediate medical care if:    You have new anxiety, or your anxiety gets worse.     You have been feeling sad, depressed, or hopeless or have lost interest in things that you usually enjoy.     You do not get better as expected.   Where can you learn more?  Go to https://www.moneymeets.net/patiented  Enter G123 in the search box to learn more about \"Generalized Anxiety Disorder: Care Instructions.\"  Current as of: June 25, 2023               Content Version: 13.8    9469-8146 [employer].   Care instructions adapted under license by your healthcare professional. If you have questions about a medical condition or this instruction, always ask your healthcare professional. [employer] disclaims any warranty or liability for your use of this information.      "

## 2023-12-29 ENCOUNTER — E-VISIT (OUTPATIENT)
Dept: URGENT CARE | Facility: CLINIC | Age: 33
End: 2023-12-29
Payer: COMMERCIAL

## 2023-12-29 ENCOUNTER — MYC MEDICAL ADVICE (OUTPATIENT)
Dept: FAMILY MEDICINE | Facility: CLINIC | Age: 33
End: 2023-12-29

## 2023-12-29 ENCOUNTER — LAB (OUTPATIENT)
Dept: LAB | Facility: CLINIC | Age: 33
End: 2023-12-29
Attending: EMERGENCY MEDICINE
Payer: COMMERCIAL

## 2023-12-29 ENCOUNTER — MYC MEDICAL ADVICE (OUTPATIENT)
Dept: URGENT CARE | Facility: CLINIC | Age: 33
End: 2023-12-29

## 2023-12-29 DIAGNOSIS — J02.0 STREP THROAT: Primary | ICD-10-CM

## 2023-12-29 DIAGNOSIS — J03.01 ACUTE RECURRENT STREPTOCOCCAL TONSILLITIS: Primary | ICD-10-CM

## 2023-12-29 DIAGNOSIS — J02.9 SORE THROAT: ICD-10-CM

## 2023-12-29 DIAGNOSIS — J02.9 SORE THROAT: Primary | ICD-10-CM

## 2023-12-29 LAB — DEPRECATED S PYO AG THROAT QL EIA: POSITIVE

## 2023-12-29 PROCEDURE — 87880 STREP A ASSAY W/OPTIC: CPT

## 2023-12-29 PROCEDURE — 99421 OL DIG E/M SVC 5-10 MIN: CPT | Performed by: EMERGENCY MEDICINE

## 2023-12-29 RX ORDER — CEFDINIR 300 MG/1
300 CAPSULE ORAL 2 TIMES DAILY
Qty: 20 CAPSULE | Refills: 0 | Status: SHIPPED | OUTPATIENT
Start: 2023-12-29 | End: 2024-01-05

## 2023-12-29 RX ORDER — AMOXICILLIN 500 MG/1
500 CAPSULE ORAL 2 TIMES DAILY
Qty: 20 CAPSULE | Refills: 0 | Status: SHIPPED | OUTPATIENT
Start: 2023-12-29 | End: 2023-12-29

## 2023-12-29 NOTE — TELEPHONE ENCOUNTER
Antibiotics changed to Cefdinir        S-(situation): Pt called to discuss the treatment provided from E-visit.     B-(background): She reports she has had strep 3 times now since September.     A-(assessment):Pt is quite concerned that Amoxicillin will not work as it has not worked the previous 2 times she had strep since September. She has needed Cefdinir both times.     R-(recommendations): She is requesting to have Rx either changed to Cefdinir or to have the cefdinir ordered as well in case the Amoxicillin doesn't work. Please advise.      Nguyễn Quintana RN [place] [facility]

## 2023-12-29 NOTE — PATIENT INSTRUCTIONS
Sore Throat: Care Instructions  Overview     Infection by bacteria or a virus causes most sore throats. Cigarette smoke, dry air, air pollution, allergies, and yelling can also cause a sore throat. Sore throats can be painful and annoying. Fortunately, most sore throats go away on their own. If you have a bacterial infection, your doctor may prescribe antibiotics.  Follow-up care is a key part of your treatment and safety. Be sure to make and go to all appointments, and call your doctor if you are having problems. It's also a good idea to know your test results and keep a list of the medicines you take.  How can you care for yourself at home?    If your doctor prescribed antibiotics, take them as directed. Do not stop taking them just because you feel better. You need to take the full course of antibiotics.    Gargle with warm salt water several times a day to help reduce swelling and relieve pain. Mix 1/2 teaspoon of salt in 1 cup of warm water.    Take an over-the-counter pain medicine, such as acetaminophen (Tylenol), ibuprofen (Advil, Motrin), or naproxen (Aleve). Read and follow all instructions on the label.    Be careful when taking over-the-counter cold or flu medicines and Tylenol at the same time. Many of these medicines have acetaminophen, which is Tylenol. Read the labels to make sure that you are not taking more than the recommended dose. Too much acetaminophen (Tylenol) can be harmful.    Drink plenty of fluids. Fluids may help soothe an irritated throat. Hot fluids, such as tea or soup, may help decrease throat pain.    Use over-the-counter throat lozenges to soothe pain. Regular cough drops or hard candy may also help. These should not be given to young children because of the risk of choking.    Do not smoke or allow others to smoke around you. If you need help quitting, talk to your doctor about stop-smoking programs and medicines. These can increase your chances of quitting for good.    Use a  "vaporizer or humidifier to add moisture to your bedroom. Follow the directions for cleaning the machine.  When should you call for help?   Call your doctor now or seek immediate medical care if:      You have trouble breathing.       Your sore throat gets much worse on one side.       You have new or worse trouble swallowing.       You have a new or higher fever.   Watch closely for changes in your health, and be sure to contact your doctor if you do not get better as expected.  Where can you learn more?  Go to https://www.[employer].net/patiented  Enter U420 in the search box to learn more about \"Sore Throat: Care Instructions.\"  Current as of: February 28, 2023               Content Version: 13.8    9814-9642 General Blood.   Care instructions adapted under license by your healthcare professional. If you have questions about a medical condition or this instruction, always ask your healthcare professional. [employer], [employer] disclaims any warranty or liability for your use of this information.      "

## 2024-01-02 NOTE — TELEPHONE ENCOUNTER
Please review referral request     + strep all listed dates   12/29- evisit for pharyngitis  10/19- tonsillitis   10/5- strep

## 2024-01-05 ENCOUNTER — VIRTUAL VISIT (OUTPATIENT)
Dept: FAMILY MEDICINE | Facility: CLINIC | Age: 34
End: 2024-01-05
Payer: COMMERCIAL

## 2024-01-05 DIAGNOSIS — F41.9 INSOMNIA SECONDARY TO ANXIETY: ICD-10-CM

## 2024-01-05 DIAGNOSIS — F51.05 INSOMNIA SECONDARY TO ANXIETY: ICD-10-CM

## 2024-01-05 DIAGNOSIS — F41.9 ANXIETY: ICD-10-CM

## 2024-01-05 PROCEDURE — 99441 PR PHYSICIAN TELEPHONE EVALUATION 5-10 MIN: CPT | Performed by: NURSE PRACTITIONER

## 2024-01-05 RX ORDER — CITALOPRAM HYDROBROMIDE 20 MG/1
30 TABLET ORAL DAILY
Qty: 135 TABLET | Refills: 1 | Status: SHIPPED | OUTPATIENT
Start: 2024-01-05 | End: 2024-04-30

## 2024-01-05 ASSESSMENT — ANXIETY QUESTIONNAIRES
1. FEELING NERVOUS, ANXIOUS, OR ON EDGE: SEVERAL DAYS
5. BEING SO RESTLESS THAT IT IS HARD TO SIT STILL: NOT AT ALL
3. WORRYING TOO MUCH ABOUT DIFFERENT THINGS: NOT AT ALL
8. IF YOU CHECKED OFF ANY PROBLEMS, HOW DIFFICULT HAVE THESE MADE IT FOR YOU TO DO YOUR WORK, TAKE CARE OF THINGS AT HOME, OR GET ALONG WITH OTHER PEOPLE?: NOT DIFFICULT AT ALL
7. FEELING AFRAID AS IF SOMETHING AWFUL MIGHT HAPPEN: NOT AT ALL
6. BECOMING EASILY ANNOYED OR IRRITABLE: SEVERAL DAYS
GAD7 TOTAL SCORE: 3
GAD7 TOTAL SCORE: 3
IF YOU CHECKED OFF ANY PROBLEMS ON THIS QUESTIONNAIRE, HOW DIFFICULT HAVE THESE PROBLEMS MADE IT FOR YOU TO DO YOUR WORK, TAKE CARE OF THINGS AT HOME, OR GET ALONG WITH OTHER PEOPLE: NOT DIFFICULT AT ALL
2. NOT BEING ABLE TO STOP OR CONTROL WORRYING: NOT AT ALL
7. FEELING AFRAID AS IF SOMETHING AWFUL MIGHT HAPPEN: NOT AT ALL
4. TROUBLE RELAXING: SEVERAL DAYS
GAD7 TOTAL SCORE: 3

## 2024-01-05 NOTE — PROGRESS NOTES
Rodger is a 33 year old who is being evaluated via a billable telephone visit.      What phone number would you like to be contacted at? [phone]   How would you like to obtain your AVS? Gretchenhart    Distant Location (provider location):  On-site    Assessment & Plan     Rodger was seen today for recheck medication.    Diagnoses and all orders for this visit:    Anxiety - more social anxiety  Insomnia secondary to anxiety  Noted increase in anxiety, will plan slight dose increase in selective serotonin reuptake inhibitor to Citalopram 30 mg daily and subsequent close follow-up in 4 weeks, sooner as needed.    -     citalopram (CELEXA) 20 MG tablet; Take 1.5 tablets (30 mg) by mouth daily      Return in about 4 weeks (around 2/2/2024) for Med check, Telephone Visit.      Deb Allen, APRNICK [facility]     Rodger is a 33 year old, presenting for the following health issues:  Recheck Medication        1/5/2024     3:12 PM   Additional Questions   Roomed by Radha MALIK CMA     History of Present Illness     Patient reports being on Citalopram for a little while (started over 1 year ago). Taking on a consistent basis, takes at night.    Hasn't felt anxiety for quite some time and then this last week noticed a physical weight of anxiety.   No significant triggers.    3.5 year old and 10 month old.  Sleep is interrupted.   No current therapy.   Works from home and this is going well.         12/19/2023     6:08 PM 12/21/2023     8:45 AM 1/5/2024     3:10 PM   GOOD-7 SCORE   Total Score 6 (mild anxiety) 6 (mild anxiety) 3 (minimal anxiety)   Total Score 6 6 3         4/19/2023     9:35 AM 10/18/2023     3:42 PM 12/26/2023    10:47 PM   PHQ   PHQ-9 Total Score 0 0 3   Q9: Thoughts of better off dead/self-harm past 2 weeks Not at all Not at all Not at all       Mental Health Follow-up:  Patient presents to follow-up on Anxiety.    Patient's anxiety since last visit has been:  Worse  The  patient is not having other symptoms associated with anxiety.  Any significant life events: No  Patient is not feeling anxious or having panic attacks.  Patient has no concerns about alcohol or drug use.    She eats 2-3 servings of fruits and vegetables daily.She consumes 0 sweetened beverage(s) daily.She exercises with enough effort to increase her heart rate 30 to 60 minutes per day.  She exercises with enough effort to increase her heart rate 3 or less days per week.   She is taking medications regularly.           Review of Systems   Constitutional, HEENT, cardiovascular, pulmonary, gi and gu systems are negative, except as otherwise noted.      Objective           Vitals:  No vitals were obtained today due to virtual visit.    Physical Exam   General:  healthy, alert, and no distress  PSYCH: Alert and oriented times 3; coherent speech, normal   rate and volume, able to articulate logical thoughts, able   to abstract reason, no tangential thoughts, no hallucinations   or delusions  Her affect is normal  RESP: No cough, no audible wheezing, able to talk in full sentences  Remainder of exam unable to be completed due to telephone visits          Phone call duration:  9 minutes  3:14 p.m. to 3:23 p.m.

## 2024-02-08 ENCOUNTER — MYC MEDICAL ADVICE (OUTPATIENT)
Dept: FAMILY MEDICINE | Facility: CLINIC | Age: 34
End: 2024-02-08
Payer: COMMERCIAL

## 2024-02-08 DIAGNOSIS — G43.109 MIGRAINE WITH AURA AND WITHOUT STATUS MIGRAINOSUS, NOT INTRACTABLE: Primary | ICD-10-CM

## 2024-02-15 RX ORDER — PROCHLORPERAZINE MALEATE 10 MG
10 TABLET ORAL EVERY 6 HOURS PRN
Qty: 20 TABLET | Refills: 1 | Status: SHIPPED | OUTPATIENT
Start: 2024-02-15

## 2024-03-03 ENCOUNTER — OFFICE VISIT (OUTPATIENT)
Dept: URGENT CARE | Facility: URGENT CARE | Age: 34
End: 2024-03-03
Payer: COMMERCIAL

## 2024-03-03 VITALS
SYSTOLIC BLOOD PRESSURE: 100 MMHG | HEART RATE: 107 BPM | TEMPERATURE: 97.9 F | OXYGEN SATURATION: 97 % | DIASTOLIC BLOOD PRESSURE: 74 MMHG

## 2024-03-03 DIAGNOSIS — J02.0 STREP THROAT: ICD-10-CM

## 2024-03-03 DIAGNOSIS — J02.9 ACUTE SORE THROAT: Primary | ICD-10-CM

## 2024-03-03 LAB — DEPRECATED S PYO AG THROAT QL EIA: POSITIVE

## 2024-03-03 PROCEDURE — 87880 STREP A ASSAY W/OPTIC: CPT

## 2024-03-03 PROCEDURE — 99213 OFFICE O/P EST LOW 20 MIN: CPT | Performed by: FAMILY MEDICINE

## 2024-03-03 RX ORDER — SUMATRIPTAN 25 MG/1
TABLET, FILM COATED ORAL
COMMUNITY
Start: 2024-02-21

## 2024-03-03 RX ORDER — CEFDINIR 300 MG/1
300 CAPSULE ORAL 2 TIMES DAILY
Qty: 20 CAPSULE | Refills: 0 | Status: SHIPPED | OUTPATIENT
Start: 2024-03-03 | End: 2024-03-13

## 2024-03-03 NOTE — PROGRESS NOTES
Assessment & Plan     Acute sore throat    - Streptococcus A Rapid Screen w/Reflex to PCR - Clinic Collect    Strep throat  Strep throat recurrent  - cefdinir (OMNICEF) 300 MG capsule  Dispense: 20 capsule; Refill: 0             No follow-ups on file.    Bran Donnelly MD  [facility] URGENT CARE MEDINA Soares is a 33 year old female who presents to clinic today for the following health issues:  Chief Complaint   Patient presents with    Urgent Care    Pharyngitis     Pt has had recurrent strep and is starting to have sx     HPI    ST  Headache  Swelling throat.  Ibuprofen.        Review of Systems        Objective    /74   Pulse 107   Temp 97.9  F (36.6  C) (Tympanic)   SpO2 97%   Breastfeeding Yes   Physical Exam  Vitals and nursing note reviewed.   Constitutional:       Appearance: Normal appearance.   HENT:      Right Ear: Tympanic membrane and ear canal normal.      Left Ear: Tympanic membrane and ear canal normal.      Mouth/Throat:      Pharynx: Posterior oropharyngeal erythema present.   Eyes:      Pupils: Pupils are equal, round, and reactive to light.   Cardiovascular:      Rate and Rhythm: Normal rate and regular rhythm.      Pulses: Normal pulses.      Heart sounds: Normal heart sounds.   Pulmonary:      Effort: Pulmonary effort is normal.      Breath sounds: Normal breath sounds.   Musculoskeletal:      Cervical back: Neck supple.   Lymphadenopathy:      Cervical: Cervical adenopathy present.   Neurological:      Mental Status: She is alert.

## 2024-04-30 ENCOUNTER — MYC MEDICAL ADVICE (OUTPATIENT)
Dept: FAMILY MEDICINE | Facility: CLINIC | Age: 34
End: 2024-04-30

## 2024-04-30 ENCOUNTER — OFFICE VISIT (OUTPATIENT)
Dept: FAMILY MEDICINE | Facility: CLINIC | Age: 34
End: 2024-04-30
Payer: COMMERCIAL

## 2024-04-30 VITALS
HEIGHT: 67 IN | WEIGHT: 150 LBS | RESPIRATION RATE: 12 BRPM | HEART RATE: 77 BPM | BODY MASS INDEX: 23.54 KG/M2 | DIASTOLIC BLOOD PRESSURE: 68 MMHG | OXYGEN SATURATION: 99 % | SYSTOLIC BLOOD PRESSURE: 102 MMHG | TEMPERATURE: 98.1 F

## 2024-04-30 DIAGNOSIS — Z01.818 PREOP GENERAL PHYSICAL EXAM: Primary | ICD-10-CM

## 2024-04-30 DIAGNOSIS — J02.0 RECURRENT STREPTOCOCCAL PHARYNGITIS: ICD-10-CM

## 2024-04-30 LAB
HCG UR QL: NEGATIVE
HGB BLD-MCNC: 12.7 G/DL (ref 11.7–15.7)

## 2024-04-30 PROCEDURE — 99214 OFFICE O/P EST MOD 30 MIN: CPT | Performed by: NURSE PRACTITIONER

## 2024-04-30 PROCEDURE — 36415 COLL VENOUS BLD VENIPUNCTURE: CPT | Performed by: NURSE PRACTITIONER

## 2024-04-30 PROCEDURE — 81025 URINE PREGNANCY TEST: CPT | Performed by: NURSE PRACTITIONER

## 2024-04-30 PROCEDURE — 85018 HEMOGLOBIN: CPT | Performed by: NURSE PRACTITIONER

## 2024-04-30 RX ORDER — CITALOPRAM HYDROBROMIDE 20 MG/1
20 TABLET ORAL DAILY
COMMUNITY
Start: 2024-04-30 | End: 2024-09-18

## 2024-04-30 ASSESSMENT — PAIN SCALES - GENERAL: PAINLEVEL: NO PAIN (0)

## 2024-04-30 NOTE — RESULT ENCOUNTER NOTE
Dear Rodger,     -Hemoglobin is normal.  There is no evidence of anemia.  -Negative pregnancy test.      Thank you,     Deb Allen, APRN, CNP  [facility] [place]

## 2024-04-30 NOTE — PROGRESS NOTES
Preoperative Evaluation  [facility]  [address] [place] [address]  Phone: [phone]  Primary Provider: Adriana Acuña  Pre-op Performing Provider: CUCO CHENG  Apr 30, 2024     Rodger is a 33 year old, presenting for the following:  Pre-Op Exam    Surgical Information  Surgery/Procedure: tonsillectomy and adenoidectomy   Surgery Location: [facility]   Surgeon: Winston   Surgery Date: 5/7/24  Time of Surgery: TBD  Where patient plans to recover: At home with family  Fax number for surgical facility: [phone]    Assessment & Plan     The proposed surgical procedure is considered INTERMEDIATE risk.    Rodger was seen today for pre-op exam.    Diagnoses and all orders for this visit:    Preop general physical exam  Recurrent streptococcal pharyngitis  -     Hemoglobin  -     HCG Qual, Urine ([id number])     - No identified additional risk factors other than previously addressed    Antiplatelet or Anticoagulation Medication Instructions   - Patient is on no antiplatelet or anticoagulation medications.    Additional Medication Instructions   - SSRIs, SNRIs, TCAs, Antipsychotics: Continue without modification.       Recommendation    APPROVAL GIVEN to proceed with proposed procedure, without further diagnostic evaluation.            Subjective       HPI related to upcoming procedure:       History of recurrent strep.  Consultation with Dr. Montero - Primary ENT.  Planning tonsillectomy and adenoidectomy.            4/23/2024    10:32 AM   Preop Questions   1. Have you ever had a heart attack or stroke? No   2. Have you ever had surgery on your heart or blood vessels, such as a stent placement, a coronary artery bypass, or surgery on an artery in your head, neck, heart, or legs? No   3. Do you have chest pain with activity? No   4. Do you have a history of  heart failure? No   5. Do you currently have a cold, bronchitis or symptoms of  other infection? No   6. Do you have a cough, shortness of breath, or wheezing? No   7. Do you or anyone in your family have previous history of blood clots? No   8. Do you or does anyone in your family have a serious bleeding problem such as prolonged bleeding following surgeries or cuts? No   9. Have you ever had problems with anemia or been told to take iron pills? No   10. Have you had any abnormal blood loss such as black, tarry or bloody stools, or abnormal vaginal bleeding? No   11. Have you ever had a blood transfusion? No   12. Are you willing to have a blood transfusion if it is medically needed before, during, or after your surgery? Yes   13. Have you or any of your relatives ever had problems with anesthesia? UNKNOWN    14. Do you have sleep apnea, excessive snoring or daytime drowsiness? No   15. Do you have any artifical heart valves or other implanted medical devices like a pacemaker, defibrillator, or continuous glucose monitor? No   16. Do you have artificial joints? No   17. Are you allergic to latex? No   18. Is there any chance that you may be pregnant? No       Health Care Directive  Patient does not have a Health Care Directive or Living Will    Preoperative Review of    reviewed - no record of controlled substances prescribed.      Status of Chronic Conditions:  See problem list for active medical problems.  Problems all longstanding and stable, except as noted/documented.  See ROS for pertinent symptoms related to these conditions.    Patient Active Problem List    Diagnosis Date Noted    Acute recurrent streptococcal tonsillitis 10/19/2023     Priority: Medium    Encounter for insertion of mirena IUD- at postpartum exam 4/2023 with ob/gyn specialists - had pap as well  04/19/2023     Priority: Medium    Normal labor and delivery 02/13/2023     Priority: Medium    Insomnia secondary to anxiety 03/19/2021     Priority: Medium    Attention and concentration deficit- no previous dx of ADHD  "- would like to do testing  03/19/2021     Priority: Medium    Vaginal delivery 08/17/2020     Priority: Medium    Indication for care in labor or delivery 08/16/2020     Priority: Medium    PIH (pregnancy induced hypertension) 08/13/2020     Priority: Medium    Anxiety - more social anxiety  11/15/2018     Priority: Medium     Used Zoloft and Trazodone for sleep in the remote past but did not find them helpful.    1/05/2018: generally doing fine but does experience \"one-off instances\" that cause her significant anxiety such as presentations at work.  Does not want to be on a daily med.  Has not tried her Lorazepam yet.      Abdominal bloating 11/15/2018     Priority: Medium    Migraine with aura and without status migrainosus, not intractable- can't do estrogen/combination ocp's  11/15/2018     Priority: Medium    Altitude sickness preventative measures 11/15/2018     Priority: Medium      Past Medical History:   Diagnosis Date    History of chicken pox     IUD (intrauterine device) in place 02/01/2014    has appt for replacement 1/2019 with [facility]      Migraine with aura and without status migrainosus, not intractable 11/15/2018    PIH (pregnancy induced hypertension) 08/13/2020    PNA (pneumonia) 05/2016    LLL    PPD screening test     Test was negative, she had exposure in [place]    Thyroid disease     Pregnacy induced     Past Surgical History:   Procedure Laterality Date    LUMPECTOMY BREAST Right 2009    Benign, done in [place]    wisdom teeth extraction - all 4        Current Outpatient Medications   Medication Sig Dispense Refill    citalopram (CELEXA) 20 MG tablet Take 1.5 tablets (30 mg) by mouth daily 135 tablet 1    prochlorperazine (COMPAZINE) 10 MG tablet Take 1 tablet (10 mg) by mouth every 6 hours as needed for nausea or vomiting (migraine) (Patient not taking: Reported on 3/3/2024) 20 tablet 1    SUMAtriptan (IMITREX) 25 MG tablet TAKE 1 TABLET BY MOUTH FOR MIGRAINE MAY REPEAT EVERY 2 HOURS  " "MG A DAY         Allergies   Allergen Reactions    Blood Transfusion Related (Informational Only) Other (See Comments)     Patient has a history of a clinically significant antibody against RBC antigens.  A delay in compatible RBCs may occur.         Social History     Tobacco Use    Smoking status: Never     Passive exposure: Never    Smokeless tobacco: Never   Substance Use Topics    Alcohol use: Yes     Family History   Problem Relation Age of Onset    Thyroid Disease Mother         partial thyroidectomy    Osteopenia Mother     Eye Disorder Maternal Grandmother         macular degeneration    Heart Disease Maternal Grandmother     Cerebrovascular Disease Maternal Grandfather         stroke and heart issues     Cardiac Sudden Death Maternal Grandfather 45    Cancer Maternal Uncle         CLL     Hypertension Father      History   Drug Use No         Review of Systems    Review of Systems  CONSTITUTIONAL: NEGATIVE for fever, chills, change in weight  INTEGUMENTARY/SKIN: NEGATIVE for worrisome rashes, moles or lesions  EYES: NEGATIVE for vision changes or irritation  ENT/MOUTH: NEGATIVE for ear, mouth and throat problems, POSITIVE for sinus congestion, last antibiotics was 2 weeks ago.   RESP: NEGATIVE for significant cough or SOB  CV: NEGATIVE for chest pain, palpitations or peripheral edema  GI: NEGATIVE for nausea, abdominal pain, heartburn, or change in bowel habits  : NEGATIVE for frequency, dysuria, or hematuria  MUSCULOSKELETAL: NEGATIVE for significant arthralgias or myalgia  NEURO: NEGATIVE for weakness, dizziness or paresthesias  ENDOCRINE: NEGATIVE for temperature intolerance, skin/hair changes  HEME: NEGATIVE for bleeding problems  PSYCHIATRIC: NEGATIVE for changes in mood or affect    Objective    /68   Pulse 77   Temp 98.1  F (36.7  C) (Tympanic)   Resp 12   Ht 1.702 m (5' 7\")   Wt 68 kg (150 lb)   LMP  (LMP Unknown)   SpO2 99%   BMI 23.49 kg/m     Estimated body mass index is 23.49 " "kg/m  as calculated from the following:    Height as of this encounter: 1.702 m (5' 7\").    Weight as of this encounter: 68 kg (150 lb).    Physical Exam  GENERAL: alert and no distress  EYES: Eyes grossly normal to inspection, PERRL and conjunctivae and sclerae normal  HENT: ear canals and TM's normal, nose and mouth without ulcers or lesions  NECK: no adenopathy, no asymmetry, masses, or scars  RESP: lungs clear to auscultation - no rales, rhonchi or wheezes  CV: regular rate and rhythm, normal S1 S2, no S3 or S4, no murmur, click or rub, no peripheral edema  ABDOMEN: soft, nontender, no hepatosplenomegaly, no masses and bowel sounds normal  MS: no gross musculoskeletal defects noted, no edema  SKIN: no suspicious lesions or rashes  NEURO: Normal strength and tone, mentation intact and speech normal  PSYCH: mentation appears normal, affect normal/bright    Recent Labs   Lab Test 01/09/23  1828   HGB 11.1*         POTASSIUM 3.7   CR 0.63        Diagnostics  Results for orders placed or performed in visit on 04/30/24   Hemoglobin     Status: Normal   Result Value Ref Range    Hemoglobin 12.7 11.7 - 15.7 g/dL   HCG Qual, Urine ([id number])     Status: Normal   Result Value Ref Range    hCG Urine Qualitative Negative Negative        No EKG required, no history of coronary heart disease, significant arrhythmia, peripheral arterial disease or other structural heart disease.    Revised Cardiac Risk Index (RCRI)  The patient has the following serious cardiovascular risks for perioperative complications:   - No serious cardiac risks = 0 points     RCRI Interpretation: 0 points: Class I (very low risk - 0.4% complication rate)         Signed Electronically by: SIVAKUMAR Granados CNP  Copy of this evaluation report is provided to requesting physician.      "

## 2024-04-30 NOTE — PATIENT INSTRUCTIONS
Preparing for Your Surgery  Getting started  A nurse will call you to review your health history and instructions. They will give you an arrival time based on your scheduled surgery time. Please be ready to share:  Your doctor's clinic name and phone number  Your medical, surgical, and anesthesia history  A list of allergies and sensitivities  A list of medicines, including herbal treatments and over-the-counter drugs  Whether the patient has a legal guardian (ask how to send us the papers in advance)  Please tell us if you're pregnant--or if there's any chance you might be pregnant. Some surgeries may injure a fetus (unborn baby), so they require a pregnancy test. Surgeries that are safe for a fetus don't always need a test, and you can choose whether to have one.   If you have a child who's having surgery, please ask for a copy of Preparing for Your Child's Surgery.    Preparing for surgery  Within 10 to 30 days of surgery: Have a pre-op exam (sometimes called an H&P, or History and Physical). This can be done at a clinic or pre-operative center.  If you're having a , you may not need this exam. Talk to your care team.  At your pre-op exam, talk to your care team about all medicines you take. If you need to stop any medicines before surgery, ask when to start taking them again.  We do this for your safety. Many medicines can make you bleed too much during surgery. Some change how well surgery (anesthesia) drugs work.  Call your insurance company to let them know you're having surgery. (If you don't have insurance, call [phone].)  Call your clinic if there's any change in your health. This includes signs of a cold or flu (sore throat, runny nose, cough, rash, fever). It also includes a scrape or scratch near the surgery site.  If you have questions on the day of surgery, call your hospital or surgery center.  Eating and drinking guidelines  For your safety: Unless your surgeon tells you otherwise,  follow the guidelines below.  Eat and drink as usual until 8 hours before you arrive for surgery. After that, no food or milk.  Drink clear liquids until 2 hours before you arrive. These are liquids you can see through, like water, Gatorade, and Propel Water. They also include plain black coffee and tea (no cream or milk), candy, and breath mints. You can spit out gum when you arrive.  If you drink alcohol: Stop drinking it the night before surgery.  If your care team tells you to take medicine on the morning of surgery, it's okay to take it with a sip of water.  Preventing infection  Shower or bathe the night before and morning of your surgery. Follow the instructions your clinic gave you. (If no instructions, use regular soap.)  Don't shave or clip hair near your surgery site. We'll remove the hair if needed.  Don't smoke or vape the morning of surgery. You may chew nicotine gum up to 2 hours before surgery. A nicotine patch is okay.  Note: Some surgeries require you to completely quit smoking and nicotine. Check with your surgeon.  Your care team will make every effort to keep you safe from infection. We will:  Clean our hands often with soap and water (or an alcohol-based hand rub).  Clean the skin at your surgery site with a special soap that kills germs.  Give you a special gown to keep you warm. (Cold raises the risk of infection.)  Wear special hair covers, masks, gowns and gloves during surgery.  Give antibiotic medicine, if prescribed. Not all surgeries need antibiotics.  What to bring on the day of surgery  Photo ID and insurance card  Copy of your health care directive, if you have one  Glasses and hearing aids (bring cases)  You can't wear contacts during surgery  Inhaler and eye drops, if you use them (tell us about these when you arrive)  CPAP machine or breathing device, if you use them  A few personal items, if spending the night  If you have . . .  A pacemaker, ICD (cardiac defibrillator) or other  implant: Bring the ID card.  An implanted stimulator: Bring the remote control.  A legal guardian: Bring a copy of the certified (court-stamped) guardianship papers.  Please remove any jewelry, including body piercings. Leave jewelry and other valuables at home.  If you're going home the day of surgery  You must have a responsible adult drive you home. They should stay with you overnight as well.  If you don't have someone to stay with you, and you aren't safe to go home alone, we may keep you overnight. Insurance often won't pay for this.  After surgery  If it's hard to control your pain or you need more pain medicine, please call your surgeon's office.  Questions?   If you have any questions for your care team, list them here: _________________________________________________________________________________________________________________________________________________________________________ ____________________________________ ____________________________________ ____________________________________  For informational purposes only. Not to replace the advice of your health care provider. Copyright   2003, 2019 [employer]. All rights reserved. Clinically reviewed by Nikky Garza MD. [employer] [id number] - REV 12/22.    How to Take Your Medication Before Surgery  - STOP taking all vitamins and herbal supplements 14 days before surgery.

## 2024-07-14 ENCOUNTER — HEALTH MAINTENANCE LETTER (OUTPATIENT)
Age: 34
End: 2024-07-14

## 2024-09-15 ENCOUNTER — MYC REFILL (OUTPATIENT)
Dept: FAMILY MEDICINE | Facility: CLINIC | Age: 34
End: 2024-09-15
Payer: COMMERCIAL

## 2024-09-18 DIAGNOSIS — F41.9 ANXIETY: ICD-10-CM

## 2024-09-18 DIAGNOSIS — F41.9 ANXIETY: Primary | ICD-10-CM

## 2024-09-18 RX ORDER — CITALOPRAM HYDROBROMIDE 20 MG/1
20 TABLET ORAL DAILY
Qty: 10 TABLET | Refills: 0 | Status: SHIPPED | OUTPATIENT
Start: 2024-09-18 | End: 2024-09-20

## 2024-09-18 RX ORDER — CITALOPRAM HYDROBROMIDE 20 MG/1
20 TABLET ORAL DAILY
Qty: 10 TABLET | Refills: 0 | OUTPATIENT
Start: 2024-09-18

## 2024-09-18 NOTE — TELEPHONE ENCOUNTER
Patient is calling to report that she recieved notification from mail delivery pharmacy that citalopram rx is being processed to ship.     (Writer does not see that PCP has sent citalopram as of yet? And advised pt of this) But pt reports she notification rx is being processed?    Pt requesting 10 day supply of local rx to be sent to pharmacy until, mail rx comes. Please advise short supply rx.

## 2024-09-18 NOTE — TELEPHONE ENCOUNTER
Medication Question or Refill    Contacts       Contact Date/Time Type Contact Phone/Fax    09/18/2024 02:14 PM CDT Phone (Incoming) Bazinet, Ali J (Self) [phone] (H)            What medication are you calling about (include dose and sig)?:     citalopram (CELEXA) 20 MG tablet #90 1/day       Preferred Pharmacy:     [facility]) - [address] [address]  Phone: [phone] Fax: [phone]          Controlled Substance Agreement on file:   CSA -- Patient Level:    CSA: None found at the patient level.       Who prescribed the medication?: Arianne    Do you need a refill? Yes asked for ten talblets until mail order completed.  Already done    When did you use the medication last? daily    Patient offered an appointment? No    Do you have any questions or concerns?  No      Could we send this information to you in [facility] or would you prefer to receive a phone call?:   Patient would prefer a phone call   Okay to leave a detailed message?: Yes at Cell number on file:    Telephone Information:   Mobile [phone]

## 2024-09-19 ENCOUNTER — MYC MEDICAL ADVICE (OUTPATIENT)
Dept: FAMILY MEDICINE | Facility: CLINIC | Age: 34
End: 2024-09-19
Payer: COMMERCIAL

## 2024-09-19 ENCOUNTER — TELEPHONE (OUTPATIENT)
Dept: FAMILY MEDICINE | Facility: CLINIC | Age: 34
End: 2024-09-19
Payer: COMMERCIAL

## 2024-09-19 DIAGNOSIS — F41.9 ANXIETY: ICD-10-CM

## 2024-09-19 RX ORDER — CITALOPRAM HYDROBROMIDE 20 MG/1
20 TABLET ORAL DAILY
OUTPATIENT
Start: 2024-09-19

## 2024-09-19 NOTE — TELEPHONE ENCOUNTER
This was denied as a duplicate but this is the long term script of Citalopram 20mg #135  taking 1.5 pills a day.    This is what history states, if you question the dosage.  She received ten pills at the local [facility] to tie her over until this script can be sent to          [facility]) - [place], [place] - [address]    Please review

## 2024-09-20 ENCOUNTER — MYC MEDICAL ADVICE (OUTPATIENT)
Dept: FAMILY MEDICINE | Facility: CLINIC | Age: 34
End: 2024-09-20
Payer: COMMERCIAL

## 2024-09-20 RX ORDER — CITALOPRAM HYDROBROMIDE 20 MG/1
20 TABLET ORAL DAILY
Qty: 90 TABLET | Refills: 1 | Status: SHIPPED | OUTPATIENT
Start: 2024-09-20

## 2024-09-20 NOTE — TELEPHONE ENCOUNTER
Pt called about this issue. Writer apologized for the confusion. Because there were so many duplicate encounters it got very confusing. Encounter has been routed to PCP high priority and writer will place note on desk and follow up with Pt when sent.     Nguyễn Quintana RN [facility]

## 2024-09-20 NOTE — TELEPHONE ENCOUNTER
Done in a separate encounter.   ? Is pt taking 1-20mg tab daily or 1.5 -20mg tabs daily?  I did the 1-20mg because that's what was teed up in the other encounter.

## 2024-09-20 NOTE — TELEPHONE ENCOUNTER
PHQ-9 score:        12/26/2023    10:47 PM   PHQ   PHQ-9 Total Score 3   Q9: Thoughts of better off dead/self-harm past 2 weeks Not at all     Pt due for annual px. Please assist pt in making appt(s) for that.   Medication #90 with 1 refill sent to [id number].       Future Appointments 9/20/2024 - 3/19/2025      None

## 2024-11-03 ENCOUNTER — OFFICE VISIT (OUTPATIENT)
Dept: URGENT CARE | Facility: URGENT CARE | Age: 34
End: 2024-11-03
Payer: COMMERCIAL

## 2024-11-03 VITALS
DIASTOLIC BLOOD PRESSURE: 67 MMHG | OXYGEN SATURATION: 100 % | HEART RATE: 87 BPM | TEMPERATURE: 98.4 F | SYSTOLIC BLOOD PRESSURE: 109 MMHG | RESPIRATION RATE: 14 BRPM

## 2024-11-03 DIAGNOSIS — R05.1 ACUTE COUGH: Primary | ICD-10-CM

## 2024-11-03 PROCEDURE — 87798 DETECT AGENT NOS DNA AMP: CPT | Performed by: FAMILY MEDICINE

## 2024-11-03 PROCEDURE — 99213 OFFICE O/P EST LOW 20 MIN: CPT | Performed by: FAMILY MEDICINE

## 2024-11-03 RX ORDER — BENZONATATE 100 MG/1
100-200 CAPSULE ORAL 3 TIMES DAILY PRN
Qty: 30 CAPSULE | Refills: 0 | Status: SHIPPED | OUTPATIENT
Start: 2024-11-03 | End: 2024-11-10

## 2024-11-03 NOTE — PATIENT INSTRUCTIONS
Today we will test for whooping cough just to play it safe given your travel and potential exposure.    Try Tessalon perles 1-2 pills every 8 hours as needed to reduce coughing.  Can also try Delsym, which is available OTC.

## 2024-11-03 NOTE — PROGRESS NOTES
ICD-10-CM    1. Acute cough  R05.1 B. pertussis/parapertussis PCR-NP     benzonatate (TESSALON) 100 MG capsule        Likely viral etiology, but need to rule out pertussis.  Exam today does not suggest bronchospasm or pneumonia.    PLAN:  Patient Instructions   Today we will test for whooping cough just to play it safe given your travel and potential exposure.    Try Tessalon perles 1-2 pills every 8 hours as needed to reduce coughing.  Can also try Delsym, which is available OTC.    SUBJECTIVE:  Allison J Bazinet is a 33 year old female who presents to  today with cough for about 2 weeks that feels like it's deeper in her chest in the last 2 days or so.  No fevers.  No shortness of breath.  No post-tussive emesis or extremely long coughing spells, but she has been around a co-worker whose child had pertussis recently.  History of pneumonia about 8 years ago as well as once during childhood.    OBJECTIVE:  /67   Pulse 87   Temp 98.4  F (36.9  C)   Resp 14   SpO2 100%   Breastfeeding No   GEN: well-appearing, in NAD  ENT: TMs normal, oral MMM, normal pharynx  Neck: no LAD noted  Lungs:  CTAB, good air entry throughout  CV:  RRR, no murmurs

## 2024-11-05 LAB
B PARAPERT DNA SPEC QL NAA+PROBE: NOT DETECTED
B PERT DNA SPEC QL NAA+PROBE: NOT DETECTED

## 2024-11-10 ENCOUNTER — MYC MEDICAL ADVICE (OUTPATIENT)
Dept: FAMILY MEDICINE | Facility: CLINIC | Age: 34
End: 2024-11-10
Payer: COMMERCIAL

## 2024-11-10 SDOH — HEALTH STABILITY: PHYSICAL HEALTH: ON AVERAGE, HOW MANY DAYS PER WEEK DO YOU ENGAGE IN MODERATE TO STRENUOUS EXERCISE (LIKE A BRISK WALK)?: 4 DAYS

## 2024-11-10 SDOH — HEALTH STABILITY: PHYSICAL HEALTH: ON AVERAGE, HOW MANY MINUTES DO YOU ENGAGE IN EXERCISE AT THIS LEVEL?: 50 MIN

## 2024-11-10 ASSESSMENT — SOCIAL DETERMINANTS OF HEALTH (SDOH): HOW OFTEN DO YOU GET TOGETHER WITH FRIENDS OR RELATIVES?: ONCE A WEEK

## 2024-11-11 NOTE — TELEPHONE ENCOUNTER
Has appt on 11/14    Requesting antibiotic    Routing to provider to review and advise.     NAOMI OVERTON RN on 11/11/2024 at 1:50 PM   [facility]

## 2024-11-13 ASSESSMENT — ANXIETY QUESTIONNAIRES
7. FEELING AFRAID AS IF SOMETHING AWFUL MIGHT HAPPEN: NOT AT ALL
7. FEELING AFRAID AS IF SOMETHING AWFUL MIGHT HAPPEN: NOT AT ALL
GAD7 TOTAL SCORE: 4
5. BEING SO RESTLESS THAT IT IS HARD TO SIT STILL: NOT AT ALL
4. TROUBLE RELAXING: SEVERAL DAYS
3. WORRYING TOO MUCH ABOUT DIFFERENT THINGS: SEVERAL DAYS
IF YOU CHECKED OFF ANY PROBLEMS ON THIS QUESTIONNAIRE, HOW DIFFICULT HAVE THESE PROBLEMS MADE IT FOR YOU TO DO YOUR WORK, TAKE CARE OF THINGS AT HOME, OR GET ALONG WITH OTHER PEOPLE: NOT DIFFICULT AT ALL
8. IF YOU CHECKED OFF ANY PROBLEMS, HOW DIFFICULT HAVE THESE MADE IT FOR YOU TO DO YOUR WORK, TAKE CARE OF THINGS AT HOME, OR GET ALONG WITH OTHER PEOPLE?: NOT DIFFICULT AT ALL
GAD7 TOTAL SCORE: 4
1. FEELING NERVOUS, ANXIOUS, OR ON EDGE: SEVERAL DAYS
6. BECOMING EASILY ANNOYED OR IRRITABLE: NOT AT ALL
GAD7 TOTAL SCORE: 4
2. NOT BEING ABLE TO STOP OR CONTROL WORRYING: SEVERAL DAYS

## 2024-11-13 ASSESSMENT — PATIENT HEALTH QUESTIONNAIRE - PHQ9
SUM OF ALL RESPONSES TO PHQ QUESTIONS 1-9: 0
10. IF YOU CHECKED OFF ANY PROBLEMS, HOW DIFFICULT HAVE THESE PROBLEMS MADE IT FOR YOU TO DO YOUR WORK, TAKE CARE OF THINGS AT HOME, OR GET ALONG WITH OTHER PEOPLE: NOT DIFFICULT AT ALL
SUM OF ALL RESPONSES TO PHQ QUESTIONS 1-9: 0

## 2024-11-14 ENCOUNTER — OFFICE VISIT (OUTPATIENT)
Dept: FAMILY MEDICINE | Facility: CLINIC | Age: 34
End: 2024-11-14
Payer: COMMERCIAL

## 2024-11-14 VITALS
OXYGEN SATURATION: 100 % | SYSTOLIC BLOOD PRESSURE: 112 MMHG | WEIGHT: 140.1 LBS | RESPIRATION RATE: 18 BRPM | TEMPERATURE: 97.9 F | BODY MASS INDEX: 22.52 KG/M2 | HEART RATE: 80 BPM | HEIGHT: 66 IN | DIASTOLIC BLOOD PRESSURE: 70 MMHG

## 2024-11-14 DIAGNOSIS — R05.1 ACUTE COUGH: ICD-10-CM

## 2024-11-14 DIAGNOSIS — G43.109 MIGRAINE WITH AURA AND WITHOUT STATUS MIGRAINOSUS, NOT INTRACTABLE: ICD-10-CM

## 2024-11-14 DIAGNOSIS — Z23 NEED FOR HEPATITIS B VACCINATION: ICD-10-CM

## 2024-11-14 DIAGNOSIS — J03.01 ACUTE RECURRENT STREPTOCOCCAL TONSILLITIS: ICD-10-CM

## 2024-11-14 DIAGNOSIS — Z23 NEED FOR COVID-19 VACCINE: ICD-10-CM

## 2024-11-14 DIAGNOSIS — F41.9 ANXIETY: ICD-10-CM

## 2024-11-14 DIAGNOSIS — Z00.00 ROUTINE GENERAL MEDICAL EXAMINATION AT A HEALTH CARE FACILITY: Primary | ICD-10-CM

## 2024-11-14 DIAGNOSIS — Z30.430 ENCOUNTER FOR INSERTION OF MIRENA IUD: ICD-10-CM

## 2024-11-14 DIAGNOSIS — Z13.6 CARDIOVASCULAR SCREENING; LDL GOAL LESS THAN 160: ICD-10-CM

## 2024-11-14 DIAGNOSIS — Z11.59 NEED FOR HEPATITIS C SCREENING TEST: ICD-10-CM

## 2024-11-14 DIAGNOSIS — E04.9 THYROID ENLARGEMENT: ICD-10-CM

## 2024-11-14 DIAGNOSIS — J20.9 ACUTE BRONCHITIS, UNSPECIFIED ORGANISM: ICD-10-CM

## 2024-11-14 LAB
CHOLEST SERPL-MCNC: 195 MG/DL
ERYTHROCYTE [DISTWIDTH] IN BLOOD BY AUTOMATED COUNT: 13.4 % (ref 10–15)
FASTING STATUS PATIENT QL REPORTED: YES
HCT VFR BLD AUTO: 38.3 % (ref 35–47)
HDLC SERPL-MCNC: 57 MG/DL
HGB BLD-MCNC: 12.7 G/DL (ref 11.7–15.7)
LDLC SERPL CALC-MCNC: 126 MG/DL
MCH RBC QN AUTO: 28 PG (ref 26.5–33)
MCHC RBC AUTO-ENTMCNC: 33.2 G/DL (ref 31.5–36.5)
MCV RBC AUTO: 85 FL (ref 78–100)
NONHDLC SERPL-MCNC: 138 MG/DL
PLATELET # BLD AUTO: 369 10E3/UL (ref 150–450)
RBC # BLD AUTO: 4.53 10E6/UL (ref 3.8–5.2)
T3 SERPL-MCNC: 100 NG/DL (ref 85–202)
T4 FREE SERPL-MCNC: 1.26 NG/DL (ref 0.9–1.7)
TRIGL SERPL-MCNC: 60 MG/DL
TSH SERPL DL<=0.005 MIU/L-ACNC: 2.51 UIU/ML (ref 0.3–4.2)
WBC # BLD AUTO: 14.7 10E3/UL (ref 4–11)

## 2024-11-14 RX ORDER — AZITHROMYCIN 250 MG/1
TABLET, FILM COATED ORAL
Qty: 6 TABLET | Refills: 0 | Status: SHIPPED | OUTPATIENT
Start: 2024-11-14

## 2024-11-14 RX ORDER — ALBUTEROL SULFATE 90 UG/1
2 INHALANT RESPIRATORY (INHALATION) EVERY 6 HOURS PRN
Qty: 18 G | Refills: 0 | Status: SHIPPED | OUTPATIENT
Start: 2024-11-14

## 2024-11-14 RX ORDER — SUMATRIPTAN 50 MG/1
50 TABLET, FILM COATED ORAL
Qty: 8 TABLET | Refills: 3 | Status: SHIPPED | OUTPATIENT
Start: 2024-11-14

## 2024-11-14 NOTE — PROGRESS NOTES
Preventive Care Visit  [facility] [place]  Adriana Acuña MD, Family Medicine  Nov 14, 2024      Assessment & Plan :       ICD-10-CM    1. Routine general medical examination at a health care facility  Z00.00 REVIEW OF HEALTH MAINTENANCE PROTOCOL ORDERS      2. Acute cough - x 3 weeks - productive of green sputum throughout day, no fever/chills/sweats, no SOB, no wheezing  R05.1       3. Acute bronchitis, unspecified organism  J20.9 azithromycin (ZITHROMAX) 250 MG tablet     albuterol (PROAIR HFA/PROVENTIL HFA/VENTOLIN HFA) 108 (90 Base) MCG/ACT inhaler      4. Need for COVID-19 vaccine  Z23 COVID-19 12+ (PFIZER)      5. Need for hepatitis B vaccination  Z23 HEPATITIS B VACCINE (20 YEARS AND OLDER) (ENGERIX-B/RECOMBIVAX)      6. CARDIOVASCULAR SCREENING; LDL GOAL LESS THAN 160  Z13.6 Lipid panel reflex to direct LDL Non-fasting     CBC with platelets     Lipid panel reflex to direct LDL Non-fasting     CBC with platelets      7. Migraine with aura and without status migrainosus, not intractable- can't do estrogen/combination ocp's   G43.109 SUMAtriptan (IMITREX) 50 MG tablet      8. Encounter for insertion of mirena IUD- at postpartum exam 4/2023 with ob/gyn specialists - had pap as well   Z30.430       9. Anxiety - more social anxiety   F41.9       10. Acute recurrent streptococcal tonsillitis- resolved s/p tonsillectomy/ adenoidectomy 5/2024 -done by Dr. Grover Montero  J03.01       11. Thyroid enlargement- right side slightly greater than left  - 11/14/2024  E04.9 T3 total     T4 free     Thyroid peroxidase antibody     Thyrotropin Receptor Antibody     TSH     US Thyroid     T3 total     T4 free     Thyroid peroxidase antibody     Thyrotropin Receptor Antibody     TSH      12. Need for hepatitis C screening test  Z11.59 Hepatitis C Screen Reflex to HCV RNA Quant and Genotype     Hepatitis C Screen Reflex to HCV RNA Quant and Genotype        Please, call our clinic or go to the ER  immediately if signs or symptoms worsen or fail to improve as anticipated.   Pt accepting of Hepatitis C testing today.     Repeat pap in  - 3 yrs from last at ob/gyn specialists - pt declines pelvic exam and recheck of IUD strings today.       Patient has been advised of split billing requirements and indicates understanding: Yes    Return in about 1 year (around 2025) for Wellness/Preventative Visit, w/ Dr. ARIAS for 40 min appt.     Counseling  Appropriate preventive services were addressed with this patient via screening, questionnaire, or discussion as appropriate for fall prevention, nutrition, physical activity, Tobacco-use cessation, social engagement, weight loss and cognition.  Checklist reviewing preventive services available has been given to the patient.  Reviewed patient's diet, addressing concerns and/or questions.       MEDICATIONS:   Orders Placed This Encounter   Medications    SUMAtriptan (IMITREX) 50 MG tablet     Sig: Take 1 tablet (50 mg) by mouth at onset of headache for migraine. May repeat in 2 hours. Max 4 tablets/24 hours.     Dispense:  8 tablet     Refill:  3     Please dispense in blister packs.    azithromycin (ZITHROMAX) 250 MG tablet     Si tabs first day, then 1 tab by mouth daily for 4 additional days     Dispense:  6 tablet     Refill:  0    albuterol (PROAIR HFA/PROVENTIL HFA/VENTOLIN HFA) 108 (90 Base) MCG/ACT inhaler     Sig: Inhale 2 puffs into the lungs every 6 hours as needed for shortness of breath, wheezing or cough.     Dispense:  18 g     Refill:  0     Pharmacy may dispense brand covered by insurance (Proair, or proventil or ventolin or generic albuterol inhaler)          - Continue other medications without change  Work on weight loss  Regular exercise  See Patient Instructions       Adriana Acuña MD          Wendy Soares is a 33 year old, presenting for the following:  Physical  and the following other medical problems:      1. Routine general  medical examination at a health care facility    2. Acute cough - x 3 weeks - productive of green sputum throughout day, no fever/chills/sweats, no SOB, no wheezing    3. Acute bronchitis, unspecified organism    4. Need for COVID-19 vaccine    5. Need for hepatitis B vaccination    6. CARDIOVASCULAR SCREENING; LDL GOAL LESS THAN 160    7. Migraine with aura and without status migrainosus, not intractable- can't do estrogen/combination ocp's     8. Encounter for insertion of mirena IUD- at postpartum exam 4/2023 with ob/gyn specialists - had pap as well     9. Anxiety - more social anxiety     10. Acute recurrent streptococcal tonsillitis- resolved s/p tonsillectomy/ adenoidectomy 5/2024 -done by Dr. Grover Montero    11. Thyroid enlargement- right side slightly greater than left  - 11/14/2024    12. Need for hepatitis C screening test            11/14/2024     8:28 AM   Additional Questions   Roomed by quoc CARDENAS   Accompanied by self          HPI  Cough productive of green sputum throughout the day. No fevers, chills or sweats. No shortness of breath or chest discomfort. Has had this for 3 weeks. No nausea/vomiting or diarrhea. No known covid-19 exposures.  No known exposures to pneumonia or bronchitis.     Anxiety   How are you doing with your anxiety since your last visit? No change  Are you having other symptoms that might be associated with anxiety? No  Have you had a significant life event? No   Are you feeling depressed? No  Do you have any concerns with your use of alcohol or other drugs? No    Social History     Tobacco Use    Smoking status: Never     Passive exposure: Never    Smokeless tobacco: Never   Vaping Use    Vaping status: Never Used   Substance Use Topics    Alcohol use: Yes    Drug use: No         12/21/2023     8:45 AM 1/5/2024     3:10 PM 11/13/2024     8:42 AM   GOOD-7 SCORE   Total Score 6 (mild anxiety) 3 (minimal anxiety) 4 (minimal anxiety)   Total Score 6 3 4        Patient-reported          10/18/2023     3:42 PM 12/26/2023    10:47 PM 11/13/2024     8:42 AM   PHQ   PHQ-9 Total Score 0 3 0    Q9: Thoughts of better off dead/self-harm past 2 weeks Not at all  Not at all  Not at all        Patient-reported         11/13/2024     8:42 AM   Last PHQ-9   1.  Little interest or pleasure in doing things 0    2.  Feeling down, depressed, or hopeless 0    3.  Trouble falling or staying asleep, or sleeping too much 0    4.  Feeling tired or having little energy 0    5.  Poor appetite or overeating 0    6.  Feeling bad about yourself 0    7.  Trouble concentrating 0    8.  Moving slowly or restless 0    Q9: Thoughts of better off dead/self-harm past 2 weeks 0    PHQ-9 Total Score 0        Patient-reported         11/13/2024     8:42 AM   GOOD-7    1. Feeling nervous, anxious, or on edge 1    2. Not being able to stop or control worrying 1    3. Worrying too much about different things 1    4. Trouble relaxing 1    5. Being so restless that it is hard to sit still 0    6. Becoming easily annoyed or irritable 0    7. Feeling afraid, as if something awful might happen 0    GOOD-7 Total Score 4    If you checked any problems, how difficult have they made it for you to do your work, take care of things at home, or get along with other people? Not difficult at all        Patient-reported     Patient database completed/updated       Health Care Directive  Patient does not have a Health Care Directive: Discussed advance care planning with patient; however, patient declined at this time.      11/10/2024   General Health   How would you rate your overall physical health? Good   Feel stress (tense, anxious, or unable to sleep) Not at all            11/10/2024   Nutrition   Three or more servings of calcium each day? Yes   Diet: Regular (no restrictions)   How many servings of fruit and vegetables per day? (!) 2-3   How many sweetened beverages each day? 0-1            11/10/2024   Exercise   Days per week of moderate/strenous  exercise 4 days   Average minutes spent exercising at this level 50 min            11/10/2024   Social Factors   Frequency of gathering with friends or relatives Once a week   Worry food won't last until get money to buy more No   Food not last or not have enough money for food? No   Do you have housing? (Housing is defined as stable permanent housing and does not include staying ouside in a car, in a tent, in an abandoned building, in an overnight shelter, or couch-surfing.) Yes   Are you worried about losing your housing? No   Lack of transportation? No   Unable to get utilities (heat,electricity)? No            11/10/2024   Dental   Dentist two times every year? Yes            11/10/2024   TB Screening   Were you born outside of the [place]? No          Today's PHQ-9 Score:       11/13/2024     8:42 AM   PHQ-9 SCORE   PHQ-9 Total Score MyChart 0   PHQ-9 Total Score 0        Patient-reported         11/10/2024   Substance Use   Alcohol more than 3/day or more than 7/wk No   Do you use any other substances recreationally? No        Social History     Tobacco Use    Smoking status: Never     Passive exposure: Never    Smokeless tobacco: Never   Vaping Use    Vaping status: Never Used   Substance Use Topics    Alcohol use: Yes    Drug use: No        Mammogram Screening - Patient under 40 years of age: Routine Mammogram Screening not recommended.         11/10/2024   STI Screening   New sexual partner(s) since last STI/HIV test? No        History of abnormal Pap smear: No - age 30-64 HPV with reflex Pap every 5 years recommended        11/15/2018    10:03 AM   PAP / HPV   PAP (Historical) NIL            11/10/2024   Contraception/Family Planning   Questions about contraception or family planning No      Reviewed and updated as needed this visit by Provider   Tobacco  Allergies  Meds  Problems  Med Hx  Surg Hx  Fam Hx            Past Medical History:   Diagnosis Date    History of chicken pox     IUD (intrauterine  device) in place 2014    has appt for replacement 2019 with [facility]      Migraine with aura and without status migrainosus, not intractable 11/15/2018    Normal labor and delivery 2023    PIH (pregnancy induced hypertension) 2020    PNA (pneumonia) 2016    LLL    PPD screening test     Test was negative, she had exposure in [place]    Thyroid disease     Pregnacy induced    Vaginal delivery 2020     Past Surgical History:   Procedure Laterality Date    LUMPECTOMY BREAST Right     Benign, done in [place]    TONSILLECTOMY & ADENOIDECTOMY Bilateral 2024    Dr. Grover Montero, Primary ENT - [facility] [place]    wisdom teeth extraction - all 4        OB History    Para Term  AB Living   3 2 2 0 1 2   SAB IAB Ectopic Multiple Live Births   0 0 0 0 2      # Outcome Date GA Lbr Danilo/2nd Weight Sex Type Anes PTL Lv   3 Term 23 39w1d 01:14 / 00:15 3.32 kg (7 lb 5.1 oz) F Vag-Spont EPI N TOMAS      Name: Kady      Apgar1: 8  Apgar5: 9   2 Term 20 39w1d 02:56 / 00:49 3.195 kg (7 lb 0.7 oz) F Induction EPI N TOMAS      Complications: Preeclampsia/Hypertension      Name: Annie Flora Bazinet      Apgar1: 8  Apgar5: 9   1 AB               Obstetric Comments   Ob/gyn specialists for pregnancy , labor and delivery.     Had mildly elevated blood pressures at end of full-term pregnancy - induced at 39+ 1 wks ega.      Lab work is in process  Labs reviewed in EPIC  BP Readings from Last 3 Encounters:   24 112/70   24 109/67   24 102/68    Wt Readings from Last 3 Encounters:   24 63.5 kg (140 lb 1.6 oz)   24 68 kg (150 lb)   10/19/23 66.2 kg (146 lb)                  Patient Active Problem List   Diagnosis    Anxiety - more social anxiety     Abdominal bloating    Migraine with aura and without status migrainosus, not intractable- can't do estrogen/combination ocp's     Alt[place] sickness preventative measures    PIH (pregnancy induced  hypertension)    Indication for care in labor or delivery    Insomnia secondary to anxiety    Attention and concentration deficit- no previous dx of ADHD - would like to do testing     Encounter for insertion of mirena IUD- at postpartum exam 4/2023 with ob/gyn specialists - had pap as well     Acute recurrent streptococcal tonsillitis- resolved s/p tonsillectomy/ adenoidectomy 5/2024 -done by Dr. Grover Montero    Thyroid enlargement- right side slightly greater than left  - 11/14/2024    CARDIOVASCULAR SCREENING; LDL GOAL LESS THAN 160     Past Surgical History:   Procedure Laterality Date    LUMPECTOMY BREAST Right 2009    Benign, done in [place]    TONSILLECTOMY & ADENOIDECTOMY Bilateral 05/2024    Dr. Grover Montero, Primary ENT - [facility] [place]    wisdom teeth extraction - all 4          Social History     Tobacco Use    Smoking status: Never     Passive exposure: Never    Smokeless tobacco: Never   Substance Use Topics    Alcohol use: Yes     Family History   Problem Relation Age of Onset    Thyroid Disease Mother         partial thyroidectomy    Osteopenia Mother     Eye Disorder Maternal Grandmother         macular degeneration    Heart Disease Maternal Grandmother     Cerebrovascular Disease Maternal Grandfather         stroke and heart issues     Cardiac Sudden Death Maternal Grandfather 45    Cancer Maternal Uncle         CLL     Hypertension Father          Current Outpatient Medications   Medication Sig Dispense Refill    albuterol (PROAIR HFA/PROVENTIL HFA/VENTOLIN HFA) 108 (90 Base) MCG/ACT inhaler Inhale 2 puffs into the lungs every 6 hours as needed for shortness of breath, wheezing or cough. 18 g 0    azithromycin (ZITHROMAX) 250 MG tablet 2 tabs first day, then 1 tab by mouth daily for 4 additional days 6 tablet 0    citalopram (CELEXA) 20 MG tablet Take 1 tablet (20 mg) by mouth daily. 90 tablet 1    SUMAtriptan (IMITREX) 50 MG tablet Take 1 tablet (50 mg) by mouth at onset of headache for  "migraine. May repeat in 2 hours. Max 4 tablets/24 hours. 8 tablet 3     Allergies   Allergen Reactions    Blood Transfusion Related (Informational Only) Other (See Comments)     Patient has a history of a clinically significant antibody against RBC antigens.  A delay in compatible RBCs may occur.      Recent Labs   Lab Test 01/09/23  1828 08/16/20  2110 08/13/20  1010   ALT 10 12 12   CR 0.63 0.92 0.86   GFRESTIMATED >90 83 >90   GFRESTBLACK  --  >90 >90   POTASSIUM 3.7  --  3.9          Review of Systems  Constitutional, HEENT, cardiovascular, pulmonary, GI, , musculoskeletal, neuro, skin, endocrine and psych systems are negative, except as otherwise noted.     Objective    Exam  /70   Pulse 80   Temp 97.9  F (36.6  C) (Tympanic)   Resp 18   Ht 1.682 m (5' 6.22\")   Wt 63.5 kg (140 lb 1.6 oz)   SpO2 100%   BMI 22.46 kg/m     Estimated body mass index is 22.46 kg/m  as calculated from the following:    Height as of this encounter: 1.682 m (5' 6.22\").    Weight as of this encounter: 63.5 kg (140 lb 1.6 oz).    Physical Exam  GENERAL: alert and no distress  EYES: Eyes grossly normal to inspection, PERRL and conjunctivae and sclerae normal  HENT: ear canals and TM's normal, nose and mouth without ulcers or lesions  NECK: no adenopathy, no asymmetry, masses, or scars. , diffuse thyroid enlargement is palpated right > left. No discrete nodularity felt.   RESP: lungs clear to auscultation - no rales, rhonchi or wheezes with deep breathing, but with cough has coarse moist rhonchi and wheezes  at the mid posterior fields that radiate throughout the lungs and don't clear with continued coughing.    BREAST: normal without masses, tenderness or nipple discharge and no palpable axillary masses or adenopathy  CV: regular rate and rhythm, normal S1 S2, no S3 or S4, no murmur, click or rub, no peripheral edema  ABDOMEN: soft, nontender, no hepatosplenomegaly, no masses and bowel sounds normal  MS: no gross " musculoskeletal defects noted, no edema  SKIN: no suspicious lesions or rashes  NEURO: Normal strength and tone, mentation intact and speech normal  PSYCH: mentation appears normal, affect normal/bright    Pt declined pelvic exam today and declined check of her IUD strings.     Signed Electronically by: Adriana Acuña MD    Answers submitted by the patient for this visit:  Patient Health Questionnaire (Submitted on 11/13/2024)  If you checked off any problems, how difficult have these problems made it for you to do your work, take care of things at home, or get along with other people?: Not difficult at all  PHQ9 TOTAL SCORE: 0  Patient Health Questionnaire (G7) (Submitted on 11/13/2024)  GOOD 7 TOTAL SCORE: 4

## 2024-11-14 NOTE — PATIENT INSTRUCTIONS
[facility]  [address] [place] [id number]  Office: [phone]   Fax:    [phone]     To prevent and/or treat mild side effects (body aches, chills, fever, fatigue) -not related to allergies - after receiving COVID-19 novel coronavirus vaccine or other vaccines :     Go into your shots very well hydrated and stay well hydrated for 3-4 days afterwards. The reactions to shots are both histamine mediated and inflammatory mediated, so we can mitigate those side effects with mild anti-inflammatories and non-sedating anti-histamines, such as claritin or allegra.     Don't take Ibuprofen prior to your shot, but rather just afterward.  No prescription steroids within 2 weeks of the shots as they can suppress your immune system, but Ibuprofen is not a significant immunosuppressant at the 400mg dosage.   Ok to take tylenol 2-325 or 2-500mg tabs prior to vaccine.    Take claritin (a non-sedating anti-histamine) 10 mg daily or allegra 180mg daily for 3 days and 2 over the counter 200mg  Ibuprofen pills every 4-6 hours with food while awake for the next 2-3 days .  Take them with food so they don't irritate your stomach.  You can do the above regimen for 2-3 days after your first,  second, or third  shots to decrease the possibility of achiness, fever, chills after your COVID-19 novel coronavirus or any current vaccines.  If you can't take Ibuprofen , you can substitute Tylenol 2-325mg tabs every 8 hours or 2-500mg tabs every 12 hours scheduled for 2-3 days after you get your shots instead.           Patient Education   Preventive Care Advice   This is general advice given by our system to help you stay healthy. However, your care team may have specific advice just for you. Please talk to your care team about your preventive care needs.  Nutrition  Eat 5 or more servings of fruits and vegetables each day.  Try wheat bread, brown rice and whole grain pasta (instead of white bread, rice,  and pasta).  Get enough calcium and vitamin D. Check the label on foods and aim for 100% of the RDA (recommended daily allowance).  Lifestyle  Exercise at least 150 minutes each week  (30 minutes a day, 5 days a week).  Do muscle strengthening activities 2 days a week. These help control your weight and prevent disease.  No smoking.  Wear sunscreen to prevent skin cancer.  Have a dental exam and cleaning every 6 months.  Yearly exams  See your health care team every year to talk about:  Any changes in your health.  Any medicines your care team has prescribed.  Preventive care, family planning, and ways to prevent chronic diseases.  Shots (vaccines)   HPV shots (up to age 26), if you've never had them before.  Hepatitis B shots (up to age 59), if you've never had them before.  COVID-19 shot: Get this shot when it's due.  Flu shot: Get a flu shot every year.  Tetanus shot: Get a tetanus shot every 10 years.  Pneumococcal, hepatitis A, and RSV shots: Ask your care team if you need these based on your risk.  Shingles shot (for age 50 and up)  General health tests  Diabetes screening:  Starting at age 35, Get screened for diabetes at least every 3 years.  If you are younger than age 35, ask your care team if you should be screened for diabetes.  Cholesterol test: At age 39, start having a cholesterol test every 5 years, or more often if advised.  Bone density scan (DEXA): At age 50, ask your care team if you should have this scan for osteoporosis (brittle bones).  Hepatitis C: Get tested at least once in your life.  STIs (sexually transmitted infections)  Before age 24: Ask your care team if you should be screened for STIs.  After age 24: Get screened for STIs if you're at risk. You are at risk for STIs (including HIV) if:  You are sexually active with more than one person.  You don't use condoms every time.  You or a partner was diagnosed with a sexually transmitted infection.  If you are at risk for HIV, ask about PrEP  "medicine to prevent HIV.  Get tested for HIV at least once in your life, whether you are at risk for HIV or not.  Cancer screening tests  Cervical cancer screening: If you have a cervix, begin getting regular cervical cancer screening tests starting at age 21.  Breast cancer scan (mammogram): If you've ever had breasts, begin having regular mammograms starting at age 40. This is a scan to check for breast cancer.  Colon cancer screening: It is important to start screening for colon cancer at age 45.  Have a colonoscopy test every 10 years (or more often if you're at risk) Or, ask your provider about stool tests like a FIT test every year or Cologuard test every 3 years.  To learn more about your testing options, visit:   .  For help making a decision, visit:   https://bit.ly/or89936.  Prostate cancer screening test: If you have a prostate, ask your care team if a prostate cancer screening test (PSA) at age 55 is right for you.  Lung cancer screening: If you are a current or former smoker ages 50 to 80, ask your care team if ongoing lung cancer screenings are right for you.  For informational purposes only. Not to replace the advice of your health care provider. Copyright   2023 [employer] [facility]. All rights reserved. Clinically reviewed by the [facility]itions Program. Quotations Book 402391 - REV 01/24.    Thank you so much or choosing [facility]  for your Health Care. It was a pleasure seeing you at your visit today! Please contact us with any questions or concerns you may have.                   Adriana Acuña MD                              To reach your [facility] care team after hours call:   [phone] press #2 \"to speak with your care team\".  This will get you to our clinic instead of routing to central [facility]  scheduling.     PLEASE NOTE OUR HOURS HAVE CHANGED secondary to COVID-19 coronavirus pandemic, as we are " trying to minimize patient exposure to the virus,  which is now widespread in the state.  These hours may change with very little notice.  We apologize for any inconvenience.       Our current clinic hours are:          Monday- Thursday   7:00am - 6:00pm  in person.      Friday  7:00am- 5:00pm                       Saturday and Sunday : Closed to in person and virtual visits        We have telephone and virtual visit times available between    7:00am - 6pm on Monday-Friday as well.                                                Phone:  [phone]      Our pharmacy hours: Monday through Friday 8:00am to 5:00pm                        Saturday - 9:00 am to 12 noon       Sunday : Closed.              Phone:  [phone]              ###  Please note: at this time we are not accepting any walk-in visits. ###      There is also information available at our web site:  [email or web address]    If your provider ordered any lab tests and you do not receive the results within 10 business days, please call the clinic.    If you need a medication refill please contact your pharmacy.  Please allow 3 business days for your refill to be completed.    Our clinic offers telephone visits and e visits.  Please ask one of your team members to explain more.      Use [employer] (secure email communication and access to your chart) to send your primary care provider a message or make an appointment. Ask someone on your Team how to sign up for [employer].

## 2024-11-15 ENCOUNTER — TELEPHONE (OUTPATIENT)
Dept: FAMILY MEDICINE | Facility: CLINIC | Age: 34
End: 2024-11-15
Payer: COMMERCIAL

## 2024-11-15 LAB
THYROPEROXIDASE AB SERPL-ACNC: 22 IU/ML
TSH RECEP AB SER-ACNC: <1.1 IU/L (ref 0–1.75)

## 2024-11-15 NOTE — TELEPHONE ENCOUNTER
Medication Question or Refill        What medication are you calling about (include dose and sig)?: Medical Clarification - [facility] - Order #[id number] - Sumatriptan Tab 50mg    Preferred Pharmacy:       [facility] - [place], [place] - [address]  [address]  [facility] [address]  [facility] [address]  Phone: [phone] Fax: [phone]      Controlled Substance Agreement on file:   CSA -- Patient Level:    CSA: None found at the patient level.       Who prescribed the medication?: Vanderscoff    Do you need a refill? Yes    When did you use the medication last? na    Patient offered an appointment? No    Do you have any questions or concerns?  No      Could we [facility] this information to you [facility] or would you prefer to receive a phone call?:   mino    Put in providers in box    Daya HARRY

## 2024-11-16 LAB — HCV AB SERPL QL IA: NONREACTIVE

## 2024-11-18 ENCOUNTER — TELEPHONE (OUTPATIENT)
Dept: FAMILY MEDICINE | Facility: CLINIC | Age: 34
End: 2024-11-18
Payer: COMMERCIAL

## 2024-11-18 NOTE — TELEPHONE ENCOUNTER
Left NON detailed message with a recorder that was a  (a man) and not sure this was the pt? To call us back at  if this was a # to the pt (said her name)

## 2024-11-18 NOTE — TELEPHONE ENCOUNTER
"Faxed signed form to [facility] - Medical Clarification \"blister packs\" none available #2     Sumatriptan TAB 50MG - NO blister packs - LM for pt    Copied into [facility] / Filed in [facility]"

## 2024-11-19 ENCOUNTER — E-VISIT (OUTPATIENT)
Dept: URGENT CARE | Facility: CLINIC | Age: 34
End: 2024-11-19
Payer: COMMERCIAL

## 2024-11-19 DIAGNOSIS — H10.30 ACUTE BACTERIAL CONJUNCTIVITIS, UNSPECIFIED LATERALITY: Primary | ICD-10-CM

## 2024-11-19 RX ORDER — POLYMYXIN B SULFATE AND TRIMETHOPRIM 1; 10000 MG/ML; [USP'U]/ML
SOLUTION OPHTHALMIC
Qty: 10 ML | Refills: 0 | Status: SHIPPED | OUTPATIENT
Start: 2024-11-19 | End: 2024-11-26

## 2024-11-19 NOTE — PATIENT INSTRUCTIONS
Thank you for choosing us for your care. I have placed an order for a prescription so that you can start treatment. View your full visit summary for details by clicking on the link below. Your pharmacist will able to address any questions you may have about the medication.     If you re not feeling better within 2-3 days, please schedule an appointment.  You can schedule an appointment right here in [facility], or call [phone]  If the visit is for the same symptoms as your eVisit, we ll refund the cost of your eVisit if seen within seven days.    Pinkeye: Care Instructions  Overview     Pinkeye is redness and swelling of the eye surface and the conjunctiva (the lining of the eyelid and the covering of the white part of the eye). Pinkeye is also called conjunctivitis. Pinkeye is often caused by infection with bacteria or a virus. Dry air, allergies, smoke, and chemicals are other common causes.  Pinkeye often gets better on its own in 7 to 10 days. Antibiotics only help if the pinkeye is caused by bacteria. Pinkeye caused by infection spreads easily. If an allergy or chemical is causing pinkeye, it will not go away unless you can avoid whatever is causing it.  Follow-up care is a key part of your treatment and safety. Be sure to make and go to all appointments, and call your doctor if you are having problems. It's also a good idea to know your test results and keep a list of the medicines you take.  How can you care for yourself at home?  Wash your hands often. Always wash them before and after you treat pinkeye or touch your eyes or face.  Use moist cotton or a clean, wet cloth to remove crust. Wipe from the inside corner of the eye to the outside. Use a clean part of the cloth for each wipe.  Put cold or warm wet cloths on your eye a few times a day if the eye hurts.  Do not wear contact lenses or eye makeup until the pinkeye is gone. Throw away any eye makeup you were using when you got pinkeye. Clean your  "contacts and storage case. If you wear disposable contacts, use a new pair when your eye has cleared and it is safe to wear contacts again.  If the doctor gave you antibiotic ointment or eyedrops, use them as directed. Use the medicine for as long as instructed, even if your eye starts looking better soon. Keep the bottle tip clean, and do not let it touch the eye area.  To put in eyedrops or ointment:  Tilt your head back, and pull your lower eyelid down with one finger.  Drop or squirt the medicine inside the lower lid.  Close your eye for 30 to 60 seconds to let the drops or ointment move around.  Do not touch the ointment or dropper tip to your eyelashes or any other surface.  Do not share towels, pillows, or washcloths while you have pinkeye.  When should you call for help?   Call your doctor now or seek immediate medical care if:    You have pain in your eye, not just irritation on the surface.     You have a change in vision or loss of vision.     You have an increase in discharge from the eye.     Your eye has not started to improve or begins to get worse within 48 hours after you start using antibiotics.     Pinkeye lasts longer than 7 days.   Watch closely for changes in your health, and be sure to contact your doctor if you have any problems.  Where can you learn more?  Go to https://www.[employer].net/patiented  Enter Y392 in the search box to learn more about \"Pinkeye: Care Instructions.\"  Current as of: June 5, 2023  Content Version: 14.2 2024 [employer] [facility] [employer].   Care instructions adapted under license by your healthcare professional. If you have questions about a medical condition or this instruction, always ask your healthcare professional. [employer] disclaims any warranty or liability for your use of this information.     Taking Care of Pinkeye at Home (01:30)  Your health professional recommends that you watch this short online health video.  Learn ways to ease the " discomfort of pinkeye and keep the infection from spreading.   Purpose: Apply home treatment for pinkeye.  Goal: Apply home treatment for pinkeye.    Watch: Scan the QR code or visit the link to view video       https://hwi.se/[email or web address]  Current as of: June 5, 2023  Content Version: 14.2 2024 [facility] [employer] [place].   Care instructions adapted under license by your healthcare professional. If you have questions about a medical condition or this instruction, always ask your healthcare professional. Health[employer] disclaims any warranty or liability for your use of this information.

## 2025-01-15 ENCOUNTER — VIRTUAL VISIT (OUTPATIENT)
Dept: URGENT CARE | Facility: CLINIC | Age: 35
End: 2025-01-15
Payer: COMMERCIAL

## 2025-01-15 DIAGNOSIS — J01.10 ACUTE NON-RECURRENT FRONTAL SINUSITIS: Primary | ICD-10-CM

## 2025-01-15 PROCEDURE — 98005 SYNCH AUDIO-VIDEO EST LOW 20: CPT

## 2025-01-15 NOTE — PROGRESS NOTES
Audrey is a 34 year old female who presents for a billable video visit.    ASSESSMENT/PLAN:  Diagnoses and all orders for this visit:    Acute non-recurrent frontal sinusitis  -     amoxicillin-clavulanate (AUGMENTIN) 875-125 MG tablet; Take 1 tablet by mouth 2 times daily for 7 days.    Patient instructions:  Take antibiotics as prescribed with food. Increase probiotics either through OTC medications or yogurts. Take antibiotics with food to decrease chance of GI upset. If no improvement or worsening symptoms please follow up with PCP or higher level of care.      Medical decision making:  Pt presents today with sinus complaints x 4 days. No improvement with over the counter medications. Ddx include viral sinus sinusitis, URI, bacterial sinusitis, AOM, tension headache among others. Based on symptoms will given a wait and see rx for patient to start in about 4-5 days if symptoms have not improved. In the mean time instructed patient to use Flonase, zyrtec, and tylenol/ibuprofen for symptoms. ED for worsening symptoms.       SUBJECTIVE: Pt reports flu over the weekend (4 days ago). Recovered from that but past 4 days has been having increased facial pain headache, mucous, coughing up mucous. Has been using sudafed and ibuprofen and neti pots with some relief.     ROS: Pertinent ROS neg other than the symptoms noted above in the HPI.     OBJECTIVE:  Vitals not done due to this being a virtual visit    GENERAL: healthy, alert and no distress  EYES: Eyes grossly normal to inspection,conjunctivae and sclerae normal  RESP: Able to speak in complete sentences, no audible wheeze or cough  SKIN: no suspicious lesions or rashes  NEURO: mentation intact and speech normal  PSYCH: mentation appears normal, affect normal/bright    Video-Visit Details    Type of service:  Video Visit  Video Start Time: 0958  Video End Time: 1007    Originating Location: Home    Distant Location:  [facility]      Platform used for Video Visit: SIVAKUMAR Vergara CNP

## 2025-02-23 ENCOUNTER — MYC MEDICAL ADVICE (OUTPATIENT)
Dept: FAMILY MEDICINE | Facility: CLINIC | Age: 35
End: 2025-02-23
Payer: COMMERCIAL

## 2025-02-23 DIAGNOSIS — D64.9 ANEMIA, UNSPECIFIED TYPE: Primary | ICD-10-CM

## 2025-02-23 DIAGNOSIS — F41.9 ANXIETY: ICD-10-CM

## 2025-02-24 RX ORDER — CITALOPRAM HYDROBROMIDE 20 MG/1
20 TABLET ORAL DAILY
Qty: 90 TABLET | Refills: 1 | Status: SHIPPED | OUTPATIENT
Start: 2025-02-24

## 2025-03-04 ENCOUNTER — LAB (OUTPATIENT)
Dept: LAB | Facility: CLINIC | Age: 35
End: 2025-03-04
Payer: COMMERCIAL

## 2025-03-04 DIAGNOSIS — D64.9 ANEMIA, UNSPECIFIED TYPE: ICD-10-CM

## 2025-03-04 LAB
BASOPHILS # BLD AUTO: 0 10E3/UL (ref 0–0.2)
BASOPHILS NFR BLD AUTO: 0 %
EOSINOPHIL # BLD AUTO: 0.1 10E3/UL (ref 0–0.7)
EOSINOPHIL NFR BLD AUTO: 1 %
ERYTHROCYTE [DISTWIDTH] IN BLOOD BY AUTOMATED COUNT: 14.2 % (ref 10–15)
FERRITIN SERPL-MCNC: 38 NG/ML (ref 6–175)
FOLATE SERPL-MCNC: >40 NG/ML (ref 4.6–34.8)
HCT VFR BLD AUTO: 38.5 % (ref 35–47)
HGB BLD-MCNC: 13 G/DL (ref 11.7–15.7)
IMM GRANULOCYTES # BLD: 0 10E3/UL
IMM GRANULOCYTES NFR BLD: 0 %
IRON BINDING CAPACITY (ROCHE): 300 UG/DL (ref 240–430)
IRON SATN MFR SERPL: 33 % (ref 15–46)
IRON SERPL-MCNC: 100 UG/DL (ref 37–145)
LYMPHOCYTES # BLD AUTO: 2.1 10E3/UL (ref 0.8–5.3)
LYMPHOCYTES NFR BLD AUTO: 24 %
MCH RBC QN AUTO: 28.6 PG (ref 26.5–33)
MCHC RBC AUTO-ENTMCNC: 33.8 G/DL (ref 31.5–36.5)
MCV RBC AUTO: 85 FL (ref 78–100)
MONOCYTES # BLD AUTO: 0.6 10E3/UL (ref 0–1.3)
MONOCYTES NFR BLD AUTO: 7 %
NEUTROPHILS # BLD AUTO: 5.8 10E3/UL (ref 1.6–8.3)
NEUTROPHILS NFR BLD AUTO: 67 %
PLATELET # BLD AUTO: 311 10E3/UL (ref 150–450)
RBC # BLD AUTO: 4.55 10E6/UL (ref 3.8–5.2)
TRANSFERRIN SERPL-MCNC: 254 MG/DL (ref 200–360)
VIT B12 SERPL-MCNC: 598 PG/ML (ref 232–1245)
WBC # BLD AUTO: 8.6 10E3/UL (ref 4–11)

## 2025-03-04 PROCEDURE — 82607 VITAMIN B-12: CPT

## 2025-03-04 PROCEDURE — 82728 ASSAY OF FERRITIN: CPT

## 2025-03-04 PROCEDURE — 84466 ASSAY OF TRANSFERRIN: CPT

## 2025-03-04 PROCEDURE — 82746 ASSAY OF FOLIC ACID SERUM: CPT

## 2025-03-04 PROCEDURE — 83540 ASSAY OF IRON: CPT

## 2025-03-04 PROCEDURE — 36415 COLL VENOUS BLD VENIPUNCTURE: CPT

## 2025-03-04 PROCEDURE — 85025 COMPLETE CBC W/AUTO DIFF WBC: CPT

## 2025-03-10 ENCOUNTER — TELEPHONE (OUTPATIENT)
Dept: FAMILY MEDICINE | Facility: CLINIC | Age: 35
End: 2025-03-10
Payer: COMMERCIAL

## 2025-03-10 NOTE — TELEPHONE ENCOUNTER
Called to triage back pain - back pain is not listed on problem list     Attempt #1  Called Phone # [phone]      Left a non detailed voicemail to please call back and ask for any available triage nurse @ [phone]    Yelena ARREOLA RN   [facility] Triage

## 2025-03-10 NOTE — TELEPHONE ENCOUNTER
Order/Referral Request    Who is requesting: Pt    Orders being requested: Physical Therapy    Reason service is needed/diagnosis: Severe chronic lower back pain (thinks it is a ql muscle and has been occurring for 3 weeks)    When are orders needed by: ASAP pt would like to be seen in PT this week.    Has this been discussed with Provider: No    Does patient have a preference on a Group/Provider/Facility? [facility] Fax [phone]    Does patient have an appointment scheduled?: No    Where to send orders: Fax    Could we send this information to you in [email or web address] or would you prefer to receive a phone call?:   Patient would like to be contacted via [email or web address]

## 2025-03-11 NOTE — TELEPHONE ENCOUNTER
Attempt # 2    Called #   Telephone Information:   Mobile [phone]         Left a non detailed VM     Ayana Murillo RN, BSN  [place] [facility]

## 2025-03-12 NOTE — TELEPHONE ENCOUNTER
Attempt # 3  Called Phone # [phone]      Was Call answered? No     Non-detailed voicemail left on March 12, 2025 1:27 PM to call clinic at: [phone].     On Call Back:     Please relay patient should discuss symptoms with triage RN and probably needs appointment to discuss back pain with primary care provider/associate provider.    Also sent MyChart.     Routing to primary care provider to advise since 3 failed attempts to reach patient.      Thank you,  Doni, Triage RN rTev Steinberg    1:27 PM 3/12/2025

## 2025-03-17 NOTE — TELEPHONE ENCOUNTER
Provider Response to 2nd Level Triage Request    I have reviewed the RN documentation. My recommendation is:  E-Visit

## 2025-05-12 ENCOUNTER — MYC MEDICAL ADVICE (OUTPATIENT)
Dept: FAMILY MEDICINE | Facility: CLINIC | Age: 35
End: 2025-05-12
Payer: COMMERCIAL

## 2025-05-12 DIAGNOSIS — F41.9 ANXIETY: ICD-10-CM

## 2025-05-12 DIAGNOSIS — R41.840 ATTENTION AND CONCENTRATION DEFICIT: Primary | ICD-10-CM

## 2025-05-12 NOTE — TELEPHONE ENCOUNTER
Citalopram available in 10, 20, 40 mg strengths. Patient wants dose she doesn't have to split. Currently on 20 mg.     Also wants referral for ADD testing. Last referred 3/10/2021.     Routing to provider to review and advise. Visit needed?  LOV: 11/14/2024     NAOMI OVERTON RN on 5/12/2025 at 11:55 AM   [facility]

## 2025-05-14 RX ORDER — CITALOPRAM HYDROBROMIDE 40 MG/1
40 TABLET ORAL DAILY
Qty: 90 TABLET | Refills: 0 | Status: CANCELLED | OUTPATIENT
Start: 2025-05-14

## 2025-05-14 NOTE — TELEPHONE ENCOUNTER
See patient's mychart response.     Celexa 40 mg teed up with pharmacy   20mg Celexa daily order in epic will need to be discontinued by provider     Routing to pcp

## 2025-05-21 RX ORDER — CITALOPRAM HYDROBROMIDE 40 MG/1
40 TABLET ORAL DAILY
Qty: 90 TABLET | Refills: 1 | Status: SHIPPED | OUTPATIENT
Start: 2025-05-21

## 2025-05-21 NOTE — TELEPHONE ENCOUNTER
Pt calling about this my chart message - needing a the medication of 40 mg celexa daily and then a referral for ADD       [facility] # [phone] ok LM     Please advise     Thank you     Ayana Murillo RN, BSN  [facility]

## 2025-05-22 ENCOUNTER — PATIENT OUTREACH (OUTPATIENT)
Dept: CARE COORDINATION | Facility: CLINIC | Age: 35
End: 2025-05-22
Payer: COMMERCIAL

## (undated) RX ORDER — LIDOCAINE HYDROCHLORIDE 40 MG/ML
INJECTION, SOLUTION RETROBULBAR
Status: DISPENSED
Start: 2023-01-09